# Patient Record
Sex: FEMALE | Race: WHITE | NOT HISPANIC OR LATINO | Employment: OTHER | URBAN - NONMETROPOLITAN AREA
[De-identification: names, ages, dates, MRNs, and addresses within clinical notes are randomized per-mention and may not be internally consistent; named-entity substitution may affect disease eponyms.]

---

## 2017-01-13 ENCOUNTER — HISTORY (OUTPATIENT)
Dept: FAMILY MEDICINE | Facility: OTHER | Age: 62
End: 2017-01-13

## 2017-01-13 ENCOUNTER — OFFICE VISIT - GICH (OUTPATIENT)
Dept: FAMILY MEDICINE | Facility: OTHER | Age: 62
End: 2017-01-13

## 2017-01-13 DIAGNOSIS — E03.9 HYPOTHYROIDISM: ICD-10-CM

## 2017-01-13 DIAGNOSIS — L30.9 DERMATITIS: ICD-10-CM

## 2017-01-13 DIAGNOSIS — B36.9 SUPERFICIAL MYCOSIS: ICD-10-CM

## 2017-01-13 DIAGNOSIS — E55.9 VITAMIN D DEFICIENCY: ICD-10-CM

## 2017-01-13 DIAGNOSIS — E11.9 TYPE 2 DIABETES MELLITUS WITHOUT COMPLICATIONS (H): ICD-10-CM

## 2017-01-13 DIAGNOSIS — E66.09 OTHER OBESITY DUE TO EXCESS CALORIES: ICD-10-CM

## 2017-01-13 DIAGNOSIS — E78.5 HYPERLIPIDEMIA: ICD-10-CM

## 2017-01-13 DIAGNOSIS — G47.30 SLEEP APNEA: ICD-10-CM

## 2017-01-13 DIAGNOSIS — R80.9 PROTEINURIA: ICD-10-CM

## 2017-01-13 LAB
A/G RATIO - HISTORICAL: 1.1 (ref 1–2)
ALB RAND URINE - HISTORICAL: 7 MG/L
ALBUMIN SERPL-MCNC: 4.2 G/DL (ref 3.5–5.7)
ALP SERPL-CCNC: 53 IU/L (ref 34–104)
ALT (SGPT) - HISTORICAL: 31 IU/L (ref 7–52)
ANION GAP - HISTORICAL: 6 (ref 5–18)
AST SERPL-CCNC: 27 IU/L (ref 13–39)
BILIRUB SERPL-MCNC: 0.5 MG/DL (ref 0.3–1)
BILIRUB UR QL: NEGATIVE
BUN SERPL-MCNC: 15 MG/DL (ref 7–25)
BUN/CREAT RATIO - HISTORICAL: 17
CALCIUM SERPL-MCNC: 9.5 MG/DL (ref 8.6–10.3)
CHLORIDE SERPLBLD-SCNC: 104 MMOL/L (ref 98–107)
CHOL/HDL RATIO - HISTORICAL: 5
CHOLESTEROL TOTAL: 150 MG/DL
CLARITY, URINE: CLEAR CLARITY
CO2 SERPL-SCNC: 27 MMOL/L (ref 21–31)
COLOR UR: YELLOW COLOR
CREAT SERPL-MCNC: 0.87 MG/DL (ref 0.7–1.3)
CREATININE, URINE - HISTORICAL: 1.4 G/L
ESTIMATED AVERAGE GLUCOSE: 131 MG/DL
GFR IF NOT AFRICAN AMERICAN - HISTORICAL: >60 ML/MIN/1.73M2
GLOBULIN - HISTORICAL: 3.8 G/DL (ref 2–3.7)
GLUCOSE SERPL-MCNC: 120 MG/DL (ref 70–105)
GLUCOSE URINE: NEGATIVE MG/DL
HDLC SERPL-MCNC: 30 MG/DL (ref 23–92)
HEMOGLOBIN A1C MONITORING (POCT) - HISTORICAL: 6.2 % (ref 4–6.2)
KETONES UR QL: NEGATIVE MG/DL
LDLC SERPL CALC-MCNC: 87 MG/DL
LEUKOCYTE ESTERASE URINE: NEGATIVE
MICROALBUMIN, RAND UR - HISTORICAL: 5 MG/G CREAT
NITRITE UR QL STRIP: NEGATIVE
NON-HDL CHOLESTEROL - HISTORICAL: 120 MG/DL
OCCULT BLOOD,URINE - HISTORICAL: NEGATIVE
PATIENT STATUS - HISTORICAL: ABNORMAL
PH UR: 8.5 [PH]
POTASSIUM SERPL-SCNC: 3.9 MMOL/L (ref 3.5–5.1)
PROT SERPL-MCNC: 8 G/DL (ref 6.4–8.9)
PROTEIN QUALITATIVE,URINE - HISTORICAL: NEGATIVE MG/DL
SODIUM SERPL-SCNC: 137 MMOL/L (ref 133–143)
SP GR UR STRIP: 1.01
TRIGL SERPL-MCNC: 163 MG/DL
TSH - HISTORICAL: 2.84 UIU/ML (ref 0.34–5.6)
UROBILINOGEN,QUALITATIVE - HISTORICAL: NORMAL EU/DL
VITAMIN D TOTAL - HISTORICAL: 61.4 NG/ML

## 2017-02-06 ENCOUNTER — COMMUNICATION - GICH (OUTPATIENT)
Dept: FAMILY MEDICINE | Facility: OTHER | Age: 62
End: 2017-02-06

## 2017-02-06 DIAGNOSIS — E78.5 HYPERLIPIDEMIA: ICD-10-CM

## 2017-03-13 ENCOUNTER — COMMUNICATION - GICH (OUTPATIENT)
Dept: FAMILY MEDICINE | Facility: OTHER | Age: 62
End: 2017-03-13

## 2017-03-13 DIAGNOSIS — E03.9 HYPOTHYROIDISM: ICD-10-CM

## 2017-04-05 ENCOUNTER — COMMUNICATION - GICH (OUTPATIENT)
Dept: FAMILY MEDICINE | Facility: OTHER | Age: 62
End: 2017-04-05

## 2017-04-05 DIAGNOSIS — B37.2 CANDIDIASIS OF SKIN AND NAIL: ICD-10-CM

## 2017-05-24 ENCOUNTER — HISTORY (OUTPATIENT)
Dept: RADIOLOGY | Facility: OTHER | Age: 62
End: 2017-05-24

## 2017-05-24 ENCOUNTER — HOSPITAL ENCOUNTER (OUTPATIENT)
Dept: RADIOLOGY | Facility: OTHER | Age: 62
End: 2017-05-24
Attending: FAMILY MEDICINE

## 2017-05-24 DIAGNOSIS — Z12.31 ENCOUNTER FOR SCREENING MAMMOGRAM FOR MALIGNANT NEOPLASM OF BREAST: ICD-10-CM

## 2017-06-23 ENCOUNTER — AMBULATORY - GICH (OUTPATIENT)
Dept: FAMILY MEDICINE | Facility: OTHER | Age: 62
End: 2017-06-23

## 2017-06-30 ENCOUNTER — OFFICE VISIT - GICH (OUTPATIENT)
Dept: FAMILY MEDICINE | Facility: OTHER | Age: 62
End: 2017-06-30

## 2017-06-30 ENCOUNTER — HISTORY (OUTPATIENT)
Dept: FAMILY MEDICINE | Facility: OTHER | Age: 62
End: 2017-06-30

## 2017-06-30 DIAGNOSIS — M25.611 STIFFNESS OF RIGHT SHOULDER, NOT ELSEWHERE CLASSIFIED: ICD-10-CM

## 2017-07-07 ENCOUNTER — HOSPITAL ENCOUNTER (OUTPATIENT)
Dept: RADIOLOGY | Facility: OTHER | Age: 62
End: 2017-07-07
Attending: FAMILY MEDICINE

## 2017-07-07 DIAGNOSIS — M25.611 STIFFNESS OF RIGHT SHOULDER, NOT ELSEWHERE CLASSIFIED: ICD-10-CM

## 2017-07-17 ENCOUNTER — AMBULATORY - GICH (OUTPATIENT)
Dept: FAMILY MEDICINE | Facility: OTHER | Age: 62
End: 2017-07-17

## 2017-07-17 DIAGNOSIS — S43.431S SUPERIOR GLENOID LABRUM LESION OF RIGHT SHOULDER, SEQUELA: ICD-10-CM

## 2017-07-27 ENCOUNTER — AMBULATORY - GICH (OUTPATIENT)
Dept: FAMILY MEDICINE | Facility: OTHER | Age: 62
End: 2017-07-27

## 2017-07-27 ENCOUNTER — COMMUNICATION - GICH (OUTPATIENT)
Dept: FAMILY MEDICINE | Facility: OTHER | Age: 62
End: 2017-07-27

## 2017-07-27 DIAGNOSIS — S43.431S SUPERIOR GLENOID LABRUM LESION OF RIGHT SHOULDER, SEQUELA: ICD-10-CM

## 2017-07-27 DIAGNOSIS — B37.2 CANDIDIASIS OF SKIN AND NAIL: ICD-10-CM

## 2017-08-18 ENCOUNTER — AMBULATORY - GICH (OUTPATIENT)
Dept: SCHEDULING | Facility: OTHER | Age: 62
End: 2017-08-18

## 2017-09-05 ENCOUNTER — COMMUNICATION - GICH (OUTPATIENT)
Dept: FAMILY MEDICINE | Facility: OTHER | Age: 62
End: 2017-09-05

## 2017-09-05 DIAGNOSIS — E03.9 HYPOTHYROIDISM: ICD-10-CM

## 2017-12-02 ENCOUNTER — COMMUNICATION - GICH (OUTPATIENT)
Dept: FAMILY MEDICINE | Facility: OTHER | Age: 62
End: 2017-12-02

## 2017-12-02 DIAGNOSIS — B37.2 CANDIDIASIS OF SKIN AND NAIL: ICD-10-CM

## 2017-12-16 ENCOUNTER — HEALTH MAINTENANCE LETTER (OUTPATIENT)
Age: 62
End: 2017-12-16

## 2017-12-27 NOTE — PROGRESS NOTES
Patient Information     Patient Name MRN Sex     Neelam Miller [id number] Female 1955      Progress Notes by Tianna Bean R.T. ([facility]) at 2017 11:25 AM     Author:  [facility] Service:  (none) Author Type:  [employer] - Registered Radiologic Technologist     Filed:  2017 11:25 AM Date of Service:  2017 11:25 AM Status:  Signed     :  Tianna Bean R.T. ([facility]) ([facility] - Registered Radiologic Technologist)            Falls Risk Criteria:    Age 65 and older or under age 4        Sensory deficits    Poor vision    Use of ambulatory aides    Impaired judgment    Unable to walk independently    Meets High Risk criteria for falls:  no

## 2017-12-27 NOTE — PROGRESS NOTES
Patient Information     Patient Name MRN Sex     Neelam Miller [id number] Female 1955      Progress Notes by Tianna Bean R.T. ([facility]) at 2017 11:25 AM     Author[facility]  [facility] Service:  (none) Author Type:  [employer] - Registered Radiologic Technologist     Filed:  2017 11:25 AM Date of Service:  2017 11:25 AM Status:  Signed     :  [facility] ([facility] - Registered Radiologic Technologist)            [place] Protocol    A. Pre-procedure verification complete yes  1-relevant information / documentation available, reviewed and properly matched to the patient; 2-consent accurate and complete, 3-equipment and supplies available    B. Site marking complete Yes  Site marked if not in continuous attendance with patient    C. TIME OUT completed yes  Time Out was conducted just prior to starting procedure to verify the eight required elements: 1-patient identity, 2-consent accurate and complete, 3-position, 4-correct side/site marked (if applicable), 5-procedure, 6-relevant images / results properly labeled and displayed (if applicable), 7-antibiotics / irrigation fluids (if applicable), 8-safety precautions.

## 2017-12-28 NOTE — TELEPHONE ENCOUNTER
Patient Information     Patient Name MRN Sex Neelam Crenshaw [id number] Female 1955      Telephone Encounter by Clementina Damon RN at 2017  9:59 AM     Author:  Clementina Damon RN Service:  (none) Author Type:  NURS- Registered Nurse     Filed:  2017 10:02 AM Encounter Date:  2017 Status:  Signed     :  Clementina Damon RN (NURS- Registered Nurse)            This is [facility]  Name of Medication: Fluconazole (DIFLUCAN) 100 mg tablet  Quantity requested: 15 x 1   Last fill date: 17  Last visit with KANDI CONN was on: 2017   Diagnosis r/t this medication request: Yeast Dermatitis      Unable to complete prescription refill per RN Medication Refill Policy.................... Clementina Damon RN ....................  2017   10:00 AM

## 2017-12-28 NOTE — TELEPHONE ENCOUNTER
Patient Information     Patient Name MRN Sex Neelam Crenshaw [id number] Female 1955      Telephone Encounter by Clementina Damon RN at 2017  1:07 PM     Author:  Clementina Damon RN Service:  (none) Author Type:  NURS- Registered Nurse     Filed:  2017  1:12 PM Encounter Date:  2017 Status:  Signed     :  Clementina Damon RN (NURS- Registered Nurse)            This is a [facility]  Name of Medication: Thyroid, Pork, (NATURE-THROID) 130 mg tab  Quantity requested: 90 x 1   Last fill date: 3/13/17  Due for refill: yes  Last visit with KANDI CONN was on: 2017    Diagnosis r/t this medication request: Hypothroidism     Unable to complete prescription refill per RN Medication Refill Policy.................... Clementina Damon RN ....................  2017   1:08 PM      TSH (uIU/mL)    Date Value   2017 2.84

## 2017-12-28 NOTE — PROGRESS NOTES
"Patient Information     Patient Name MRN Sex Neelam Crenshaw [id number] Female 1955      Progress Notes by Flori Perry MD at 2017  1:15 PM     Author:  Flori Perry MD Service:  (none) Author Type:  Physician     Filed:  2017  2:06 PM Encounter Date:  2017 Status:  Signed     :  Flori Perry MD (Physician)            SUBJECTIVE:    Neelam Miller is a 62 y.o. female who presents for evaluation of shoulder pain.  Nursing Notes:   Gosselin, Norma J  2017  1:19 PM  Signed  Patient presents to clinic for right shoulder pain. Requesting MRI and PT referral to see Ludivina Langford.  Norma J Gosselin LPN....................  2017   1:15 PM           HPI  Neelam Miller is a 62 y.o. female had called to request an MRI scan of her right shoulder. The following is the message that she had sent in:  \"I need to get a referral to get an MRI on my right shoulder.  I have been working with my chiropractor (Dr. Lisa Simmons) at [facility]. I have been complaining of a limitation in my right shoulder--namely being able to lift my arm upwards. 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   Dr. Simmons' note states:   \"Patient has limited abduction into right shoulder; suspect a supraspinatus tendon inflammatory problem and lessor-bicep tendon.\" \"  Onset of symptoms since April.  She was helping her 3-pod dog getting up and and down the stairs.  She was noticing anterior shoulder pain and tender and posterior upper arm tenderness too.  She was practicing sterngth movements, noticed that some of her range of motion was unequal. Pain is mild to moderate, relieved with over-the-counter analgesics and rest      Allergies      Allergen   Reactions     Antihistamines - Alkylamine  Insomnia     Corn  Other - Describe In Comment Field     " Allergy testing      Gluten  Other - Describe In Comment Field     Allergy testing      Milk  Nausea And Vomiting     Mold Extracts  Other - Describe In Comment Field     Allergy testing      Wheat Bran  Other - Describe In Comment Field     Allergy testing    ,   Current Outpatient Prescriptions     Medication  Sig     ascorbic acid (VITAMIN C) 500 mg tablet Take 500 mg by mouth once daily.     aspirin 81 mg tablet Take 81 mg by mouth once daily with a meal.     B.ani-L.aci-L.sal-L.plan-L.Ernie (PROBIOTIC FORMULA) 10 billion cell (2 billion ea) ElPL Take  by mouth once daily.     calcium-vitamin D3-vitamin K, 500 mg-100 units-40 mcg, (VIACTIV) 500-100-40 mg-unit-mcg chewable tablet Take 2 tablets by mouth once daily.     clotrimazole-betamethasone cream (LOTRISONE) 1-0.05 % cream Apply to nose daily as needed     econazole nitrate cream (SPECTAZOLE 1%) 1 % cream Apply  topically to affected area(s) once daily.     ERGOCALCIFEROL (VITAMIN D ORAL) Take  by mouth.     fluconazole (DIFLUCAN) 100 mg tablet Take 1 tablet by mouth once daily.     hydroCHLOROthiazide (HCTZ) 25 mg tablet Take 1 tablet by mouth once daily.     lisinopril (PRINIVIL; ZESTRIL) 20 mg tablet Take 1 tablet by mouth once daily.     MAGNESIUM ORAL Take  by mouth once daily.     multivitamin (MVI) tablet Take 1 tablet by mouth once daily.     omega-3s-dha-epa-fish oil (OMEGA 3) 350-400 mg cap daily     simvastatin (ZOCOR) 20 mg tablet Take 1 tablet by mouth at bedtime.     Thyroid, Pork, (NATURE-THROID) 130 mg tab Take 130 mg by mouth once daily.     vitamin B complex (B COMPLEX 1) tablet Take 1 tablet by mouth once daily.     Zinc Gluconate 30 mg tablet Take 1 capsule by mouth once daily.     No current facility-administered medications for this visit.      Medications have been reviewed by me and are current to the best of my knowledge and ability.  and   Past Medical History:     Diagnosis  Date     Allergic rhinitis      Depression 03/2012      "Diabetes     Type 2      Hormone replacement therapy (postmenopausal) 5/04     discontinued      Hyperlipidemia      Hypertension      Hypothyroid      Lupus (HC)     pt denies-tests were neg      Obesity      Sleep apnea     wears cpap prn        REVIEW OF SYSTEMS:  ROS    OBJECTIVE:  /72  Pulse 76  Temp 98.7  F (37.1  C) (Tympanic)  Ht 1.727 m (5' 8\")  Wt 111.4 kg (245 lb 8 oz)  BMI 37.33 kg/m2    EXAM:   Physical Exam   Constitutional: She is well-developed, well-nourished, and in no distress.   Musculoskeletal:   AC joint tenderness; tenderness ant to elbow/posteriorly; she has decreased range of motion in abduction and external rotation. Internal rotation is fairly normal. She has a negative empty can sign. Strength is intact. Sensation is intact.   Nursing note and vitals reviewed.      ASSESSMENT/PLAN:    ICD-10-CM    1. Decreased right shoulder range of motion M25.611 MR ARTHROGRAM SHOULDER RIGHT      XR INJ CT/MR ARTHROGRAM SHOULDER RIGHT      AMB CONSULT TO PHYSICAL THERAPY        Plan:  1. Concerns with her shoulder symptoms include increased range of motion, pain for nearly 2 months. She's been going to chiropractor, receiving conservative care. At this point, she'll be scheduled for an MR arthrogram of the right shoulder and referred to physical therapy. Additional follow-up will be pending results of this.  Flori Perry MD           "

## 2017-12-30 NOTE — NURSING NOTE
Patient Information     Patient Name MRN Sex Neelam Crenshaw [id number] Female 1955      Nursing Note by Gosselin, Norma J at 2017  1:15 PM     Author:  Gosselin, Norma J Service:  (none) Author Type:  (none)     Filed:  2017  1:19 PM Encounter Date:  2017 Status:  Signed     :  Gosselin, Norma J            Patient presents to clinic for right shoulder pain. Requesting MRI and PT referral to see Ludivina Langford.  Norma J Gosselin LPN....................  2017   1:15 PM

## 2018-01-02 NOTE — NURSING NOTE
Patient Information     Patient Name MRN Neleam Gray [id number] Female 1955      Nursing Note by Tia Jackman at 2017  9:00 AM     Author:  Tia Jackman Service:  (none) Author Type:  (none)     Filed:  2017  9:31 AM Encounter Date:  2017 Status:  Signed     :  Tia Jackman            Patient here today for medication management  Tia Jackman LPN..............................2017  9:10 AM

## 2018-01-03 NOTE — TELEPHONE ENCOUNTER
Patient Information     Patient Name MRN Neelam Gray [id number] Female 1955      Telephone Encounter by Clementina Damon RN at 2017  8:42 AM     Author:  Clementina Damon RN Service:  (none) Author Type:  NURS- Registered Nurse     Filed:  2017  8:44 AM Encounter Date:  2017 Status:  Signed     :  Clementina Damon RN (NURS- Registered Nurse)            Refill request inappropriate. Filled 17-90 x 3. Pharmacy notified. Unable to complete prescription refill per RN Medication Refill Policy.................... Clementina Damon RN ....................  2017   8:44 AM    2017  Ordered by: KANDI CONN  Medication:simvastatin (ZOCOR) 20 mg tablet    Qty:90 tablet   Ref:3  Start:2017   End:              Route:Oral                  DALTON:No   Class:eRx    Sig:Take 1 tablet by mouth at bedtime.    Pharmacy:[facility] [id number] [place] [facility], [place] [address]

## 2018-01-03 NOTE — PROGRESS NOTES
Patient Information     Patient Name MRN Sex Neelam Crenshaw [id number] Female 1955      Progress Notes by Flori Perry MD at 2017  9:00 AM     Author:  Flori Perry MD Service:  (none) Author Type:  Physician     Filed:  1/15/2017 10:29 AM Encounter Date:  2017 Status:  Signed     :  Flori Perry MD (Physician)            SUBJECTIVE:    Neelam Miller is a 61 y.o. female who presents for medication follow up   Nursing Notes:   Tia Jackman  2017  9:31 AM  Signed  Patient here today for medication management  Tia Jackman LPN..............................2017  9:10 AM      HPI  Neelam Miller is a 61 y.o. female presents for medication follow up.  Her medical history is significant for type 2 diabetes, Vitamin D deficiency, hypothyroidism, hyperlipidemia, obesity, sleep apnea and recurrent fungal dermatitis.      She is currently on simvastatin for hyperlipidemia and type 2 diabetes. Wonders about taking CoQ10 along with this.  Has episodes of myalgias.  Lately her stress level has been lower as school requirements are coming to an end.  Just returned from [place], felt a cold coming on before she left.  Took mucinex, also did nasal irrigation  She hasn't been checking BS at home.  States she is not compliant with her diet or exercise routine.  Denies problems with her feet.  Does get recurrent fungal infections which she attributes more to work place exposure than her blood sugars.  Flu vaccine is up to date.  Just returned from [place] - anticipates that Vitamin D level should be good.  HEMOGLOBIN A1C MONITORING (POCT)    Date Value   2015 6.2 %   2012 5.8 % NGSP     HEMOGLOBIN A1C GIH (%)    Date Value   2012 5.5      Allergies      Allergen   Reactions     Antihistamines - Alkylamine  Insomnia     Corn  Other - Describe In Comment Field     Allergy testing      Gluten  Other - Describe In Comment Field     Allergy testing       Milk  Nausea And Vomiting     Mold Extracts  Other - Describe In Comment Field     Allergy testing      Wheat Bran  Other - Describe In Comment Field     Allergy testing    ,   Current Outpatient Prescriptions     Medication  Sig     ascorbic acid (VITAMIN C) 500 mg tablet Take 500 mg by mouth once daily.     aspirin 81 mg tablet Take 81 mg by mouth once daily with a meal.     B.ani-L.aci-L.sal-L.plan-L.Ernie (PROBIOTIC FORMULA) 10 billion cell (2 billion ea) ElPL Take  by mouth once daily.     calcium-vitamin D3-vitamin K, 500 mg-100 units-40 mcg, (VIACTIV) 500-100-40 mg-unit-mcg chewable tablet Take 2 tablets by mouth once daily.     clotrimazole-betamethasone cream (LOTRISONE) 1-0.05 % cream Apply to nose daily as needed     econazole nitrate cream (SPECTAZOLE 1%) 1 % cream Apply  topically to affected area(s) once daily.     ERGOCALCIFEROL (VITAMIN D ORAL) Take  by mouth.     fluconazole (DIFLUCAN) 100 mg tablet TAKE ONE TABLET BY MOUTH ONE TIME DAILY     hydroCHLOROthiazide (HCTZ) 25 mg tablet Take 1 tablet by mouth once daily.     lisinopril (PRINIVIL; ZESTRIL) 20 mg tablet TAKE ONE TABLET BY MOUTH ONE TIME DAILY      MAGNESIUM ORAL Take  by mouth once daily.     multivitamin (MVI) tablet Take 1 tablet by mouth once daily.     omega-3s-dha-epa-fish oil (OMEGA 3) 350-400 mg cap daily     simvastatin (ZOCOR) 20 mg tablet TAKE ONE TABLET BY MOUTH NIGHTLY AT BEDTIME     Thyroid, Pork, (NATURE-THROID) 130 mg tab Take 130 mg by mouth once daily.     vitamin B complex (B COMPLEX 1) tablet Take 1 tablet by mouth once daily.     Zinc Gluconate 30 mg tablet Take 1 capsule by mouth once daily.     No current facility-administered medications for this visit.      Medications have been reviewed by me and are current to the best of my knowledge and ability.  and   Past Medical History      Diagnosis   Date     Allergic rhinitis       Depression  03/2012     Diabetes       Type 2      Hormone replacement therapy  (postmenopausal)  5/04      discontinued      Hyperlipidemia       Hypertension       Hypothyroid       Lupus (HC)       pt denies-tests were neg      Obesity       Sleep apnea       wears cpap prn        REVIEW OF SYSTEMS:  Review of Systems   Constitutional: Negative for weight loss.   HENT:        Last dentist visit was in august   Eyes: Negative for blurred vision.        Last eye exam October 2016   Genitourinary: Positive for urgency.   Skin:        Skin on nose, flaking and peeling   Endo/Heme/Allergies: Positive for environmental allergies. Does not bruise/bleed easily.   Psychiatric/Behavioral: Negative for depression.   Mammogram due next month.    OBJECTIVE:  /70  Pulse 66  Wt 110.8 kg (244 lb 3.2 oz)  BMI 37.14 kg/m2    EXAM:   Physical Exam   Constitutional: She is well-developed, well-nourished, and in no distress.   Cardiovascular: Normal rate and regular rhythm.    Pulmonary/Chest: Effort normal and breath sounds normal.   Musculoskeletal:   Normal foot exam.   Skin:   Erythema and peeling of her nose, some telangectasia of her cheeks.   Psychiatric: Mood and affect normal.   Vitals reviewed.    Results for orders placed or performed in visit on 01/13/17      HEMOGLOBIN A1C MONITORING (POCT)      Result  Value Ref Range    HEMOGLOBIN A1C MONITORING (POCT) 6.2 4.0 - 6.2 %    ESTIMATED AVERAGE GLUCOSE  131 mg/dL   COMP METABOLIC PANEL      Result  Value Ref Range    SODIUM 137 133 - 143 mmol/L    POTASSIUM 3.9 3.5 - 5.1 mmol/L    CHLORIDE 104 98 - 107 mmol/L    CO2,TOTAL 27 21 - 31 mmol/L    ANION GAP 6 5 - 18                    GLUCOSE 120 (H) 70 - 105 mg/dL    CALCIUM 9.5 8.6 - 10.3 mg/dL    BUN 15 7 - 25 mg/dL    CREATININE 0.87 0.70 - 1.30 mg/dL    BUN/CREAT RATIO           17                    GFR if African American >60 >60 ml/min/1.73m2    GFR if not African American >60 >60 ml/min/1.73m2    ALBUMIN 4.2 3.5 - 5.7 g/dL    PROTEIN,TOTAL 8.0 6.4 - 8.9 g/dL    GLOBULIN                  3.8  (H) 2.0 - 3.7 g/dL    A/G RATIO 1.1 1.0 - 2.0                    BILIRUBIN,TOTAL 0.5 0.3 - 1.0 mg/dL    ALK PHOSPHATASE 53 34 - 104 IU/L    ALT (SGPT) 31 7 - 52 IU/L    AST (SGOT) 27 13 - 39 IU/L   LIPID PANEL      Result  Value Ref Range    CHOLESTEROL,TOTAL 150 <200 mg/dL    TRIGLYCERIDES 163 (H) <150 mg/dL    HDL CHOLESTEROL 30 23 - 92 mg/dL    NON-HDL CHOLESTEROL 120 <145 mg/dl    CHOL/HDL RATIO            5.00 (H) <4.50                    LDL CHOLESTEROL 87 <100 mg/dL    PATIENT STATUS            NOT GIVEN                   TSH      Result  Value Ref Range    TSH 2.84 0.34 - 5.60 uIU/mL   VITAMIN D 25 (DEFICIENCY)      Result  Value Ref Range    VITAMIN D TOTAL AFL 61.4   ng/mL   MICROALBUMIN RANDOM URINE      Result  Value Ref Range    ALB RAND URINE            7.0 mg/L    CREATININE,URINE          1.40 g/L    MICROALBUMIN,RAND UR      5.0 <30.0 mg/g creat   URINALYSIS W REFLEX MICROSCOPIC IF POSITIVE      Result  Value Ref Range    COLOR                     Yellow Yellow Color    CLARITY                   Clear Clear Clarity    SPECIFIC GRAVITY,URINE    1.015 1.010, 1.015, 1.020, 1.025                    PH,URINE                  8.5 6.0, 7.0, 8.0, 5.5, 6.5, 7.5, 8.5                    UROBILINOGEN,QUALITATIVE  Normal Normal EU/dl    PROTEIN, URINE Negative Negative mg/dL    GLUCOSE, URINE Negative Negative mg/dL    KETONES,URINE             Negative Negative mg/dL    BILIRUBIN,URINE           Negative Negative                    OCCULT BLOOD,URINE        Negative Negative                    NITRITE                   Negative Negative                    LEUKOCYTE ESTERASE        Negative Negative                     PHQ Depression Screening 7/15/2015 11/20/2015   Date of PHQ exam (doc flow) 7/15/2015 11/20/2015   1. Lack of interest/pleasure 0 - Not at all 0 - Not at all   2. Feeling down/depressed 1 - Several days 0 - Not at all   PHQ-2 TOTAL SCORE 1 0   3. Trouble sleeping 0 - Not at all 0 - Not at all   4.  Decreased energy 1 - Several days 1 - Several days   5. Appetite change 1 - Several days 1 - Several days   6. Feelings of failure 1 - Several days 0 - Not at all   7. Trouble concentrating 0 - Not at all 0 - Not at all   8. Activity level 0 - Not at all 0 - Not at all   9. Hurting yourself 0 - Not at all 0 - Not at all   PHQ-9 TOTAL SCORE 4 2   PHQ-9 Severity Level none none   Functional Impairment not difficult at all not difficult at all       ASSESSMENT/PLAN:    ICD-10-CM    1. DIABETES MELLITUS, TYPE II E11.9 HEMOGLOBIN A1C MONITORING (POCT)      COMP METABOLIC PANEL      LIPID PANEL      MICROALBUMIN RANDOM URINE      URINALYSIS W REFLEX MICROSCOPIC IF POSITIVE      hydroCHLOROthiazide (HCTZ) 25 mg tablet   2. Acquired hypothyroidism E03.9 TSH   3. Non morbid obesity due to excess calories E66.09    4. Hyperlipidemia, unspecified hyperlipidemia type E78.5 simvastatin (ZOCOR) 20 mg tablet   5. MICROALBUMINURIA R80.9 lisinopril (PRINIVIL; ZESTRIL) 20 mg tablet   6. Sleep apnea, unspecified type G47.30    7. Vitamin D deficiency E55.9 VITAMIN D 25 (DEFICIENCY)   8. FUNGAL DERMATITIS B36.9         Plan:  1.  I have personally reviewed the labs listed above.  2.  Adequate diabetes control.  Encouraged exercise, wt loss measures for long term treatment.  3.  Vitamin D level normal - no changes needed.  4.  Continue same dose of synthroid.  5.  Microalbuminuria improved.  6.  Continue sleep apnea treatment.  7.  BP with adequate control.  8.  Immunizations are up to date.  9.  Ok to add over the counter CoQ10 for statin related myalgias..    10 follow up in 6 months.    Flori Perry MD

## 2018-01-03 NOTE — TELEPHONE ENCOUNTER
Patient Information     Patient Name MRN Sex Neelam Crenshaw [id number] Female 1955      Telephone Encounter by Clementina Damon RN at 3/13/2017  8:49 AM     Author:  Clementina Damon RN Service:  (none) Author Type:  NURS- Registered Nurse     Filed:  3/13/2017  9:20 AM Encounter Date:  3/13/2017 Status:  Signed     :  Clementina Damon RN (NURS- Registered Nurse)            This is a Refill request from: [facility]  Name of Medication: Nature-Throid 130 mg  Quantity requested: 90 x 3   Last fill date: 16  Due for refill: yes  Last visit with FLORI CONN was on: 2017 in [facility]  PCP:  Flori Conn MD  Controlled Substance Agreement:  n/a   Diagnosis r/t this medication request: Acquired Hypothyroidism      Unable to complete prescription refill per RN Medication Refill Policy.................... Clementina Damon RN ....................  3/13/2017   8:49 AM  Lab testing requirements:  TSH annually  TSH (uIU/mL)    Date Value   2017 2.84

## 2018-01-03 NOTE — PATIENT INSTRUCTIONS
Patient Information     Patient Name MRN Sex Neelam Crenshaw [id number] Female 1955      Patient Instructions by Flori Perry MD at 2017  9:00 AM     Author:  Flori Perry MD Service:  (none) Author Type:  Physician     Filed:  2017  9:33 AM Encounter Date:  2017 Status:  Signed     :  Flori Perry MD (Physician)            COQ10 dose, 10mg along with simvastatin, over the counter

## 2018-01-04 NOTE — TELEPHONE ENCOUNTER
Patient Information     Patient Name MRN Sex Neelam Crenshaw [id number] Female 1955      Telephone Encounter by Clementina Damon RN at 2017 11:01 AM     Author:  Clementina Damon RN Service:  (none) Author Type:  NURS- Registered Nurse     Filed:  2017 11:06 AM Encounter Date:  2017 Status:  Signed     :  Clementina Damon RN (NURS- Registered Nurse)            [facility]  Name of Medication: Fluconazole (DIFLUCAN) 100 mg tablet  Quantity requested: 15 x 1   Last fill date: 17  Due for refill:   Last visit with FLORI CONN was on: 2017 in [facility]-due for a follow up in 2017  PCP:  Flori Conn MD  Controlled Substance Agreement: na  Diagnosis r/t this medication request: Yeast Dermatitis     Unable to complete prescription refill per RN Medication Refill Policy.................... Clementina Damon RN ....................  2017   11:02 AM

## 2018-01-05 NOTE — PROGRESS NOTES
Patient Information     Patient Name MRN Sex Neelam Crenshaw [id number] Female 1955      Progress Notes by Salima Jurado at 2017 12:38 PM     Author:  Salima Jurado Service:  (none) Author Type:  (none)     Filed:  2017 12:38 PM Date of Service:  2017 12:38 PM Status:  Signed     :  Salima Jurado            Falls Risk Criteria:    Age 65 and older or under age 4        Sensory deficits    Poor vision    Use of ambulatory aides    Impaired judgment    Unable to walk independently    Meets High Risk criteria for falls:  no

## 2018-01-08 ENCOUNTER — COMMUNICATION - GICH (OUTPATIENT)
Dept: FAMILY MEDICINE | Facility: OTHER | Age: 63
End: 2018-01-08

## 2018-01-08 DIAGNOSIS — E03.9 HYPOTHYROIDISM: ICD-10-CM

## 2018-01-19 PROBLEM — G47.30 SLEEP APNEA: Status: ACTIVE | Noted: 2018-01-19

## 2018-01-19 PROBLEM — S43.431A TEAR OF RIGHT GLENOID LABRUM: Status: ACTIVE | Noted: 2017-07-17

## 2018-01-19 PROBLEM — R80.9 MICROALBUMINURIA: Status: ACTIVE | Noted: 2018-01-19

## 2018-01-19 PROBLEM — E66.9 OBESITY: Status: ACTIVE | Noted: 2018-01-19

## 2018-01-19 PROBLEM — E03.9 HYPOTHYROIDISM: Status: ACTIVE | Noted: 2018-01-19

## 2018-01-19 PROBLEM — E11.9 DIABETES MELLITUS, TYPE II (H): Status: ACTIVE | Noted: 2018-01-19

## 2018-01-19 PROBLEM — E78.5 HYPERLIPIDEMIA: Status: ACTIVE | Noted: 2018-01-19

## 2018-01-19 RX ORDER — ECONAZOLE NITRATE 10 MG/G
CREAM TOPICAL
COMMUNITY
Start: 2014-04-01 | End: 2018-03-05

## 2018-01-19 RX ORDER — ASCORBIC ACID 500 MG
500 TABLET ORAL DAILY
COMMUNITY
End: 2018-03-05

## 2018-01-19 RX ORDER — CLOTRIMAZOLE AND BETAMETHASONE DIPROPIONATE 10; .64 MG/G; MG/G
CREAM TOPICAL
COMMUNITY
Start: 2017-01-13 | End: 2021-11-03

## 2018-01-19 RX ORDER — MULTIVITAMIN WITH IRON
TABLET ORAL
COMMUNITY
End: 2018-03-05

## 2018-01-19 RX ORDER — SIMVASTATIN 20 MG
20 TABLET ORAL AT BEDTIME
COMMUNITY
Start: 2017-01-13 | End: 2018-03-05

## 2018-01-19 RX ORDER — FLUCONAZOLE 100 MG/1
100 TABLET ORAL DAILY
COMMUNITY
Start: 2017-12-04 | End: 2018-03-05

## 2018-01-19 RX ORDER — VITAMIN B COMPLEX
1 TABLET ORAL DAILY
COMMUNITY
End: 2023-08-22

## 2018-01-19 RX ORDER — DIPHENOXYLATE HYDROCHLORIDE AND ATROPINE SULFATE 2.5; .025 MG/1; MG/1
1 TABLET ORAL DAILY
COMMUNITY
End: 2021-04-06

## 2018-01-19 RX ORDER — CALCIUM CARBONATE/VITAMIN D3 600 MG-10
1 TABLET ORAL DAILY
COMMUNITY
End: 2024-06-24

## 2018-01-19 RX ORDER — HYDROCHLOROTHIAZIDE 25 MG/1
25 TABLET ORAL DAILY
COMMUNITY
Start: 2017-01-13 | End: 2018-03-05

## 2018-01-19 RX ORDER — LISINOPRIL 20 MG/1
20 TABLET ORAL DAILY
COMMUNITY
Start: 2017-01-13 | End: 2018-03-05

## 2018-01-19 RX ORDER — ERGOCALCIFEROL 1.25 MG/1
CAPSULE ORAL
COMMUNITY
End: 2018-03-05

## 2018-01-25 ENCOUNTER — COMMUNICATION - GICH (OUTPATIENT)
Dept: FAMILY MEDICINE | Facility: OTHER | Age: 63
End: 2018-01-25

## 2018-01-25 VITALS
TEMPERATURE: 98.7 F | SYSTOLIC BLOOD PRESSURE: 124 MMHG | BODY MASS INDEX: 37.21 KG/M2 | DIASTOLIC BLOOD PRESSURE: 72 MMHG | WEIGHT: 245.5 LBS | HEART RATE: 76 BPM | HEIGHT: 68 IN

## 2018-01-25 VITALS
SYSTOLIC BLOOD PRESSURE: 122 MMHG | WEIGHT: 244.2 LBS | BODY MASS INDEX: 37.14 KG/M2 | DIASTOLIC BLOOD PRESSURE: 70 MMHG | HEART RATE: 66 BPM

## 2018-01-25 DIAGNOSIS — E78.5 HYPERLIPIDEMIA: ICD-10-CM

## 2018-02-08 ENCOUNTER — AMBULATORY - GICH (OUTPATIENT)
Dept: FAMILY MEDICINE | Facility: OTHER | Age: 63
End: 2018-02-08

## 2018-02-08 DIAGNOSIS — M25.569 PAIN IN KNEE: ICD-10-CM

## 2018-02-12 NOTE — TELEPHONE ENCOUNTER
Patient Information     Patient Name MRN Sex Neelam Crenshaw [id number] Female 1955      Telephone Encounter by Clementina Damon RN at 2017 10:30 AM     Author:  Clementina Damon RN Service:  (none) Author Type:  NURS- Registered Nurse     Filed:  2017 10:32 AM Encounter Date:  2017 Status:  Signed     :  Clementina Damon RN (NURS- Registered Nurse)            [facility]  Name of Medication: fluconazole (DIFLUCAN) 100 mg tablet  Quantity requested: 15 x 1   Last fill date: 2017  Due for refill: yes pr pharmacy  Last visit with KANDI CONN was on: 2017   Diagnosis r/t this medication request: Yeast dermatitis     Unable to complete prescription refill per RN Medication Refill Policy.................... Clementina Damon RN ....................  2017   10:30 AM

## 2018-02-12 NOTE — TELEPHONE ENCOUNTER
Patient Information     Patient Name MRN Sex Neelam Crenshaw [id number] Female 1955      Telephone Encounter by Clementina Damon RN at 2018 11:26 AM     Author:  Clementina Damon RN Service:  (none) Author Type:  NURS- Registered Nurse     Filed:  2018 11:34 AM Encounter Date:  2018 Status:  Signed     :  Clementina Damon RN (NURS- Registered Nurse)            This is a Refill request from: [facility]  Name of Medication: NATURE-THROID 130 mg tab  Quantity requested: 90  Last fill date: 17  Last visit with KANDI CONN was on: 2017. Last medication management OV was on 17. Reminder letter sent.   Diagnosis r/t this medication request: hypothyroidism      Unable to complete prescription refill per RN Medication Refill Policy.................... Clementina Damon RN ....................  2018   11:26 AM      TSH (uIU/mL)    Date Value   2017 2.84

## 2018-02-13 NOTE — TELEPHONE ENCOUNTER
Patient Information     Patient Name MRN Sex Neelam Crenshaw [id number] Female 1955      Telephone Encounter by Clementina Damon RN at 2018 11:35 AM     Author:  Clementina Damon RN Service:  (none) Author Type:  NURS- Registered Nurse     Filed:  2018 11:44 AM Encounter Date:  2018 Status:  Signed     :  Clementina Damon RN (NURS- Registered Nurse)            Statins  Office visit in the past 12 months.  Last visit with KANDI CONN was on: 2017 in [facility]  Next visit with KANDI CONN is on: No future appointment listed with this provider  Next visit with Family Practice is on: No future appointment listed in this department  Last Lipids:  Chol: 150    2017  T    2017  HDL:   30    2017  LDL:  87    2017  LDL DIRECT:  No results found in past 5 years    .  Concommitant use of fibrates and statins-If it is an addition to the medication list, review note and/or discuss with provider.  If already on medication list, refill.  Max refills 12 months from last office visit.    Due for annual medication management OV.  Limited refill per protocol and letter mailed.  Clementina Damon RN ........   2018    11:42 AM

## 2018-02-22 ENCOUNTER — TRANSFERRED RECORDS (OUTPATIENT)
Dept: HEALTH INFORMATION MANAGEMENT | Facility: OTHER | Age: 63
End: 2018-02-22

## 2018-03-05 ENCOUNTER — OFFICE VISIT (OUTPATIENT)
Dept: FAMILY MEDICINE | Facility: OTHER | Age: 63
End: 2018-03-05
Attending: FAMILY MEDICINE
Payer: COMMERCIAL

## 2018-03-05 VITALS
SYSTOLIC BLOOD PRESSURE: 132 MMHG | WEIGHT: 240.6 LBS | HEIGHT: 68 IN | BODY MASS INDEX: 36.46 KG/M2 | HEART RATE: 68 BPM | DIASTOLIC BLOOD PRESSURE: 88 MMHG

## 2018-03-05 DIAGNOSIS — I10 ESSENTIAL HYPERTENSION: ICD-10-CM

## 2018-03-05 DIAGNOSIS — E78.5 HYPERLIPIDEMIA, UNSPECIFIED HYPERLIPIDEMIA TYPE: ICD-10-CM

## 2018-03-05 DIAGNOSIS — B37.9 YEAST INFECTION: ICD-10-CM

## 2018-03-05 DIAGNOSIS — E11.9 TYPE 2 DIABETES MELLITUS WITHOUT COMPLICATION, WITHOUT LONG-TERM CURRENT USE OF INSULIN (H): ICD-10-CM

## 2018-03-05 DIAGNOSIS — R80.9 MICROALBUMINURIA: ICD-10-CM

## 2018-03-05 DIAGNOSIS — E55.9 VITAMIN D DEFICIENCY: ICD-10-CM

## 2018-03-05 DIAGNOSIS — E03.9 ACQUIRED HYPOTHYROIDISM: ICD-10-CM

## 2018-03-05 DIAGNOSIS — G47.33 OBSTRUCTIVE SLEEP APNEA SYNDROME: ICD-10-CM

## 2018-03-05 DIAGNOSIS — Z00.00 ROUTINE GENERAL MEDICAL EXAMINATION AT A HEALTH CARE FACILITY: Primary | ICD-10-CM

## 2018-03-05 LAB
ALBUMIN SERPL-MCNC: 4.5 G/DL (ref 3.5–5.7)
ALP SERPL-CCNC: 51 U/L (ref 34–104)
ALT SERPL W P-5'-P-CCNC: 40 U/L (ref 7–52)
ANION GAP SERPL CALCULATED.3IONS-SCNC: 8 MMOL/L (ref 3–14)
AST SERPL W P-5'-P-CCNC: 29 U/L (ref 13–39)
BILIRUB SERPL-MCNC: 0.6 MG/DL (ref 0.3–1)
BUN SERPL-MCNC: 13 MG/DL (ref 7–25)
CALCIUM SERPL-MCNC: 9.3 MG/DL (ref 8.6–10.3)
CHLORIDE SERPL-SCNC: 102 MMOL/L (ref 98–107)
CHOLEST SERPL-MCNC: 148 MG/DL
CO2 SERPL-SCNC: 30 MMOL/L (ref 21–31)
CREAT SERPL-MCNC: 0.84 MG/DL (ref 0.6–1.2)
CREAT UR-MCNC: 137 MG/DL
DEPRECATED CALCIDIOL+CALCIFEROL SERPL-MC: 89.3 NG/ML
GFR SERPL CREATININE-BSD FRML MDRD: 68 ML/MIN/1.7M2
GLUCOSE SERPL-MCNC: 119 MG/DL (ref 70–105)
HBA1C MFR BLD: 6.2 % (ref 4–6)
HDLC SERPL-MCNC: 36 MG/DL (ref 23–92)
LDLC SERPL CALC-MCNC: 76 MG/DL
MICROALBUMIN UR-MCNC: 12 MG/L
MICROALBUMIN/CREAT UR: 8.61 MG/G CR (ref 0–25)
NONHDLC SERPL-MCNC: 112 MG/DL
POTASSIUM SERPL-SCNC: 3.8 MMOL/L (ref 3.5–5.1)
PROT SERPL-MCNC: 8.2 G/DL (ref 6.4–8.9)
SODIUM SERPL-SCNC: 140 MMOL/L (ref 134–144)
TRIGL SERPL-MCNC: 182 MG/DL
TSH SERPL DL<=0.05 MIU/L-ACNC: 1.98 IU/ML (ref 0.34–5.6)

## 2018-03-05 PROCEDURE — 84443 ASSAY THYROID STIM HORMONE: CPT | Performed by: FAMILY MEDICINE

## 2018-03-05 PROCEDURE — 80053 COMPREHEN METABOLIC PANEL: CPT | Performed by: FAMILY MEDICINE

## 2018-03-05 PROCEDURE — 82306 VITAMIN D 25 HYDROXY: CPT | Performed by: FAMILY MEDICINE

## 2018-03-05 PROCEDURE — 99396 PREV VISIT EST AGE 40-64: CPT | Performed by: FAMILY MEDICINE

## 2018-03-05 PROCEDURE — 80061 LIPID PANEL: CPT | Performed by: FAMILY MEDICINE

## 2018-03-05 PROCEDURE — 83036 HEMOGLOBIN GLYCOSYLATED A1C: CPT | Performed by: FAMILY MEDICINE

## 2018-03-05 PROCEDURE — 36415 COLL VENOUS BLD VENIPUNCTURE: CPT | Performed by: FAMILY MEDICINE

## 2018-03-05 PROCEDURE — 82043 UR ALBUMIN QUANTITATIVE: CPT | Performed by: FAMILY MEDICINE

## 2018-03-05 RX ORDER — HYDROCHLOROTHIAZIDE 25 MG/1
25 TABLET ORAL DAILY
Qty: 90 TABLET | Refills: 3 | Status: SHIPPED | OUTPATIENT
Start: 2018-03-05 | End: 2019-02-25

## 2018-03-05 RX ORDER — SIMVASTATIN 20 MG
20 TABLET ORAL AT BEDTIME
Qty: 90 TABLET | Refills: 3 | Status: SHIPPED | OUTPATIENT
Start: 2018-03-05 | End: 2019-02-25

## 2018-03-05 RX ORDER — LISINOPRIL 20 MG/1
10 TABLET ORAL DAILY
Qty: 45 TABLET | Refills: 3 | Status: SHIPPED | OUTPATIENT
Start: 2018-03-05 | End: 2018-03-05

## 2018-03-05 RX ORDER — FLUCONAZOLE 100 MG/1
100 TABLET ORAL PRN
Qty: 60 TABLET | Refills: 5 | Status: SHIPPED | OUTPATIENT
Start: 2018-03-05 | End: 2019-07-01

## 2018-03-05 RX ORDER — LISINOPRIL 10 MG/1
10 TABLET ORAL DAILY
Qty: 90 TABLET | Refills: 3 | Status: SHIPPED | OUTPATIENT
Start: 2018-03-05 | End: 2019-02-25

## 2018-03-05 ASSESSMENT — PAIN SCALES - GENERAL: PAINLEVEL: MILD PAIN (3)

## 2018-03-05 NOTE — PROGRESS NOTES
SUBJECTIVE:    Neelam Miller is a 62 year old female who presents for her annual exam.  Nursing Notes:   Chelsey Gutierrez LPN  3/5/2018  8:47 AM  Signed  Patient presents to the clinic today for a px.    Previous A1C is at goal of <8  No results found for: A1C  Urine microalbumin:creatine: 1/13/17  Foot exam has appointment today.   Eye exam 9/2017    Tobacco User no   Patient is on a daily aspirin  Patient is on a Statin.  Blood pressure today of BP:    HR:  is at the goal of <139/89 for diabetics.       Chelsey Gutierrez LPN.................. 3/5/2018 8:30 AM      HPI: Neelam Miller is a 62 year old female presents for her annual exam.  She has type 2 diabetes - due for labs today.    Last pap: has had hysterectomy   Immunizations:  Up to date   Mammogram due in May  Colon cancer screening up to date - due 2025        PROBLEM LIST:  Patient Active Problem List   Diagnosis     Perennial allergic rhinitis     Bunion, left     Diabetes mellitus, type II (H)     Fungal dermatitis     Ganglion cyst     Hyperlipidemia     Hypothyroidism     Knee pain     Microalbuminuria     Obesity     Sleep apnea     Tear of right glenoid labrum     Vitamin D deficiency     PAST MEDICAL HISTORY:  Past Medical History:   Diagnosis Date     Allergic rhinitis     No Comments Provided     Essential (primary) hypertension     No Comments Provided     Hormone replacement therapy (postmenopausal)     5/04, discontinued     Hyperlipidemia     No Comments Provided     Hypothyroidism     No Comments Provided     Major depressive disorder, single episode     03/2012     Obesity     No Comments Provided     Sleep apnea     wears cpap prn     Systemic lupus erythematosus (H)     pt denies-tests were neg     Type 2 or unspecified type diabetes mellitus     Type 2     SURGICAL HISTORY:  Past Surgical History:   Procedure Laterality Date     CHOLECYSTECTOMY      1979     COLONOSCOPY      10/5/04     COLONOSCOPY      3/11/15     HYSTERECTOMY TOTAL  ABDOMINAL, BILATERAL SALPINGO-OOPHORECTOMY, COMBINED      02,for uterine fibroids     LAPAROSCOPIC BYPASS GASTRIC      2007,lap-band     MAMMOPLASTY REDUCTION      2/11/10     OTHER SURGICAL HISTORY      1979,31151.0,([place]) IR CHOLANGIOGRAM TRANSHEPATIC       SOCIAL HISTORY:  Social History     Social History     Marital status: Single     Spouse name: Bibi     Number of children: N/A     Years of education: 18+     Occupational History     PROFESSOR Other     Social History Main Topics     Smoking status: Never Smoker     Smokeless tobacco: Never Used     Alcohol use 0.0 oz/week     Drug use: Not on file      Comment: Drug use: No     Sexual activity: Yes     Partners: Female     Other Topics Concern     Not on file     Social History Narrative    Has PhD in music history; Working on masters program in theology at [employer] - completed 2017  Teaches at [employer].  Partner:  Bibi     FAMILY HISTORY:  Family History   Problem Relation Age of Onset     HEART DISEASE Father      Heart Disease, at 80, had pacemaker, history of MI     DIABETES Father      Diabetes,Type 2     Family History Negative Sister      Good Health, younger sister     Family History Negative Sister      Good Health, younger sister     Hypertension Brother      Hypertension, younger brother     DIABETES Mother 40     Diabetes     HEART DISEASE Mother      Heart Disease, age 61 of CHF     DIABETES Paternal Grandmother      Diabetes,Type 2     HEART DISEASE Brother      Heart Disease,pericarditis     Family History Negative Sister      Good Health, younger sister     CURRENT MEDICATIONS:   Current Outpatient Prescriptions   Medication Sig Dispense Refill     aspirin 81 MG tablet Take 81 mg by mouth daily with food       Calcium-Vitamin D-Vitamin K 500-100-40 MG-UNT-MCG CHEW Take 2 tablets by mouth daily       clotrimazole-betamethasone (LOTRISONE) cream Apply to nose daily as needed       Thyroid 130 MG TABS Take 1 tablet by mouth  daily       hydrochlorothiazide (HYDRODIURIL) 25 MG tablet Take 25 mg by mouth daily       lisinopril (PRINIVIL/ZESTRIL) 20 MG tablet Take 20 mg by mouth daily       Multiple Vitamin (MULTI-VITAMINS) TABS Take 1 tablet by mouth daily       simvastatin (ZOCOR) 20 MG tablet Take 20 mg by mouth At Bedtime       B Complex Vitamins (VITAMIN-B COMPLEX) TABS Take 1 tablet by mouth daily       zinc gluconate 30 MG TABS Take 1 capsule by mouth daily       Ascorbic Acid 1000 MG TABS Take  by mouth. 1000 mg daily       ERGOCALCIFEROL 10,000 Units daily.       fluconazole (DIFLUCAN) 100 MG tablet Take 100 mg by mouth as needed.       Magnesium 400 MG CAPS Take 400 mg by mouth daily.       Omega-3 Fatty Acids (OMEGA-3 EPA FISH OIL PO) Take  by mouth. 350-400 mg cap  1 grm daily       [DISCONTINUED] hydrochlorothiazide (HYDRODIURIL) 25 MG tablet Take 25 mg by mouth daily.       [DISCONTINUED] lisinopril (PRINIVIL,ZESTRIL) 20 MG tablet Take 20 mg by mouth daily.       [DISCONTINUED] simvastatin (ZOCOR) 20 MG tablet Take 1 tablet by mouth daily.       ALLERGIES:  Alkylamines; Corn dextrin; Corn oil; Gluten; Gluten meal; Lac bovis; Milk protein; Mold; and Wheat bran     REVIEW OF SYSTEMS:  General: goes to gym three times a week now  Wt Readings from Last 4 Encounters:   03/05/18 240 lb 9.6 oz (109.1 kg)   06/30/17 245 lb 8 oz (111.4 kg)   01/13/17 244 lb 3.2 oz (110.8 kg)   07/15/15 241 lb (109.3 kg)     Eyes: glasses, checked yearly  Ears/Nose/Throat: lastdentist visit utd  Cardiovascular: denies problems  Respiratory: denies problems  Gastrointestinal: denies problems; colonoscopy is up to date - due 2025   Genitourinary: denies problems  Musculoskeletal: right shoulder, still aches, has had P.T; will be going to chiropractor too (hasn't gone in 4 months); right MCL strain after tackled by her dog  Skin: recurrent yeast  Neurologic: denies problems  Psychiatric: denies problems  Endocrine: due for thyroid and type 2 diabetes  "check ; she hasn't always been able to get the consistently with [facility]   Heme/Lymphatic: denies problems  Allergic/Immunologic: denies problems  PHQ-2 Score:     PHQ-2 ( 1999 Pfizer) 3/5/2018   Q1: Little interest or pleasure in doing things 0   Q2: Feeling down, depressed or hopeless 0   PHQ-2 Score 0       OBJECTIVE:  /88 (BP Location: Right arm, Patient Position: Sitting, Cuff Size: Adult Large)  Pulse 68  Ht 5' 8\" (1.727 m)  Wt 240 lb 9.6 oz (109.1 kg)  Breastfeeding? No  BMI 36.58 kg/m2   EXAM:   General Appearance: Pleasant, alert, appropriate appearance for age. No acute distress  Head Exam: Normal. Normocephalic, atraumatic.  Eye Exam:  Normal external eye, conjunctiva,lids, cornea. LUCILLE.  Ear Exam: Normal TM's bilaterally. Normal auditory canals and external ears. Non-tender.  Nose Exam: Normal external nose, mucus membranes, and septum.  OroPharynx Exam:  Dental hygieneadequate. Normal buccal mucose. Normal pharynx.  Neck Exam:  Supple, no masses or nodes.  Thyroid Exam: No nodules or enlargement.  Chest/Respiratory Exam: Normal chest wall and respirations. Clear to auscultation.  Breast Exam: previous reduction/scars  Cardiovascular Exam: Regular rate and rhythm. S1, S2, no murmur, click, gallop, or rubs.  Abdomen - lapband port noted  Musculoskeletal Exam: Back is straight and non-tender, full ROM of upper and lower extremities.  FootExam: Left and right foot: good pedal pulses, no lesions, nail hygiene good.  Monofilament testing is normal  Skin: Mild bilateral axillary rash  Neurologic Exam: Nonfocal, symmetric DTRs, normal gross motor, tone coordination and no tremor.  Psychiatric Exam: Alert and oriented - appropriate affect.    ASSESSMENT/PLAN    1. Routine general medical examination at a health care facility    2. Type 2 diabetes mellitus without complication, without long-term current use of insulin (H)    3. Hyperlipidemia, unspecified hyperlipidemia type    4. Acquired hypothyroidism  "   5. Microalbuminuria    6. Obstructive sleep apnea syndrome    7. Vitamin D deficiency    8. Yeast infection      1.  Labs due today include her diabetes labs, TSH, vitamin D.  2.  Continue same medications at this time  3.  He was Asians are reviewed, these are up-to-date  4.  Mammogram will be due in May.  Other healthcare maintenance is up-to-date    Flori Perry MD

## 2018-03-05 NOTE — MR AVS SNAPSHOT
"              After Visit Summary   3/5/2018    Neelam Miller    MRN: [id number]           Patient Information     Date Of Birth          1955        Visit Information        Provider Department      3/5/2018 8:15 AM Flori Carney MD [facility]        Today's Diagnoses     Routine general medical examination at a health care facility    -  1    Type 2 diabetes mellitus without complication, without long-term current use of insulin (H)        Hyperlipidemia, unspecified hyperlipidemia type        Acquired hypothyroidism        Microalbuminuria        Obstructive sleep apnea syndrome        Vitamin D deficiency        Yeast infection        Essential hypertension           Follow-ups after your visit        Who to contact     If you have questions or need follow up information about today's clinic visit or your schedule please contact [facility] directly at [phone].  Normal or non-critical lab and imaging results will be communicated to you by HackHandshart, letter or phone within 4 business days after the clinic has received the results. If you do not hear from us within 7 days, please contact the clinic through HackHandshart or phone. If you have a critical or abnormal lab result, we will notify you by phone as soon as possible.  Submit refill requests through Swipp or call your pharmacy and they will forward the refill request to us. Please allow 3 business days for your refill to be completed.          Additional Information About Your Visit        HackHandsharZazuba Information     Swipp lets you send messages to your doctor, view your test results, renew your prescriptions, schedule appointments and more. To sign up, go to [email or web address] . Click on \"Log in\" on the left side of the screen, which will take you to the Welcome page. Then click on \"Sign up Now\" on the right side of the page.     You will be asked to enter the access code listed below, as " "well as some personal information. Please follow the directions to create your username and password.     Your access code is: [email or web address]  Expires: 6/3/2018 10:08 AM     Your access code will  in 90 days. If you need help or a new code, please call your [facility] clinic or [phone].        Care EveryWhere ID     This is your Care EveryWhere ID. This could be used by other organizations to access your [facility] medical records  [id number]        Your Vitals Were     Pulse Height Breastfeeding? BMI (Body Mass Index)          68 5' 8\" (1.727 m) No 36.58 kg/m2         Blood Pressure from Last 3 Encounters:   18 132/88   17 124/72   17 122/70    Weight from Last 3 Encounters:   18 240 lb 9.6 oz (109.1 kg)   17 245 lb 8 oz (111.4 kg)   17 244 lb 3.2 oz (110.8 kg)              We Performed the Following     Albumin Random Urine Quantitative with Creat Ratio     Comprehensive metabolic panel (BMP + Alb, Alk Phos, ALT, AST, Total. Bili, TP)     Hemoglobin A1c     Lipid Profile (Chol, Trig, HDL, LDL calc) - FASTING     TSH GH     Vitamin D Total GH          Today's Medication Changes          These changes are accurate as of 3/5/18 10:08 AM.  If you have any questions, ask your nurse or doctor.               These medicines have changed or have updated prescriptions.        Dose/Directions    ascorbic acid 1000 MG Tabs tablet   This may have changed:  Another medication with the same name was removed. Continue taking this medication, and follow the directions you see here.   Changed by:  Flori Carney MD        Take  by mouth. 1000 mg daily   Refills:  0       aspirin 81 MG tablet   This may have changed:  Another medication with the same name was removed. Continue taking this medication, and follow the directions you see here.   Changed by:  Flori Carney MD        Dose:  81 mg   Take 81 mg by mouth daily with food   Refills:  0       ERGOCALCIFEROL "   This may have changed:  Another medication with the same name was removed. Continue taking this medication, and follow the directions you see here.   Changed by:  Flori Carney MD        Dose:  73043 Units   10,000 Units daily.   Refills:  0       fluconazole 100 MG tablet   Commonly known as:  DIFLUCAN   This may have changed:  Another medication with the same name was removed. Continue taking this medication, and follow the directions you see here.   Used for:  Yeast infection   Changed by:  Flori Carney MD        Dose:  100 mg   Take 1 tablet (100 mg) by mouth as needed   Quantity:  60 tablet   Refills:  5       hydrochlorothiazide 25 MG tablet   Commonly known as:  HYDRODIURIL   This may have changed:  Another medication with the same name was removed. Continue taking this medication, and follow the directions you see here.   Used for:  Type 2 diabetes mellitus without complication, without long-term current use of insulin (H)   Changed by:  Flori Carney MD        Dose:  25 mg   Take 1 tablet (25 mg) by mouth daily   Quantity:  90 tablet   Refills:  3       lisinopril 10 MG tablet   Commonly known as:  PRINIVIL/ZESTRIL   This may have changed:    - medication strength  - how much to take  - Another medication with the same name was removed. Continue taking this medication, and follow the directions you see here.   Used for:  Essential hypertension   Changed by:  Flori Carney MD        Dose:  10 mg   Take 1 tablet (10 mg) by mouth daily   Quantity:  90 tablet   Refills:  3       Magnesium 400 MG Caps   This may have changed:  Another medication with the same name was removed. Continue taking this medication, and follow the directions you see here.   Changed by:  Flori Carney MD        Dose:  400 mg   Take 400 mg by mouth daily.   Refills:  0       MULTI-VITAMINS Tabs   This may have changed:  Another medication with the same name was  removed. Continue taking this medication, and follow the directions you see here.   Changed by:  Flori Carney MD        Dose:  1 tablet   Take 1 tablet by mouth daily   Refills:  0       simvastatin 20 MG tablet   Commonly known as:  ZOCOR   This may have changed:  Another medication with the same name was removed. Continue taking this medication, and follow the directions you see here.   Used for:  Hyperlipidemia, unspecified hyperlipidemia type   Changed by:  Flori Carney MD        Dose:  20 mg   Take 1 tablet (20 mg) by mouth At Bedtime   Quantity:  90 tablet   Refills:  3       Vitamin-B Complex Tabs   This may have changed:  Another medication with the same name was removed. Continue taking this medication, and follow the directions you see here.   Changed by:  Flori Carney MD        Dose:  1 tablet   Take 1 tablet by mouth daily   Refills:  0       zinc gluconate 30 MG Tabs   This may have changed:  Another medication with the same name was removed. Continue taking this medication, and follow the directions you see here.   Changed by:  Flori Carney MD        Dose:  1 capsule   Take 1 capsule by mouth daily   Refills:  0         Stop taking these medicines if you haven't already. Please contact your care team if you have questions.     DOCOSAHEXAENOIC ACID PO   Stopped by:  Flori Carney MD           econazole nitrate 1 % cream   Stopped by:  Flori Carney MD           LACTOBACILLUS PO   Stopped by:  Flori Carney MD           PROBIOTIC FORMULA PO   Stopped by:  Flori Carney MD           THYROID (PORK) PO   Stopped by:  Flori Carney MD           UNABLE TO FIND   Stopped by:  Flori Carney MD                Where to get your medicines      These medications were sent to [facility] - [place], [place] - [address]  [address] [place] [address]     Phone:  [phone]     fluconazole 100 MG tablet    hydrochlorothiazide 25 MG tablet    lisinopril 10 MG tablet    simvastatin 20 MG tablet    Thyroid 130 MG Tabs                Primary Care Provider Office Phone # Fax #    Flori DEE Jewel Dash [phone] [address] [place] [address]        Equal Access to Services     [facility]: Hadii aad ku hadasho Soomaali, waaxda luqadaha, qaybta kaalmada adeegyada, waxay idiin hayaan adeeg christianne lacasn . So [facility] [phone].    ATENCIÓN: Si habla español, tiene a plascencia disposición servicios gratuitos de asistencia lingüística. Llame al [phone].    We comply with applicable federal civil rights laws and [place] laws. We do not discriminate on the basis of race, color, national origin, age, disability, sex, sexual orientation, or gender identity.            Thank you!     Thank you for choosing [facility]  for your care. Our goal is always to provide you with excellent care. Hearing back from our patients is one way we can continue to improve our services. Please take a few minutes to complete the written survey that you may receive in the mail after your visit with us. Thank you!             Your Updated Medication List - Protect others around you: Learn how to safely use, store and throw away your medicines at www.disposemymeds.org.          This list is accurate as of 3/5/18 10:08 AM.  Always use your most recent med list.                   Brand Name Dispense Instructions for use Diagnosis    ascorbic acid 1000 MG Tabs tablet      Take  by mouth. 1000 mg daily        aspirin 81 MG tablet      Take 81 mg by mouth daily with food        Calcium-Vitamin D-Vitamin K 500-100-40 MG-UNT-MCG Chew      Take 2 tablets by mouth daily        clotrimazole-betamethasone cream    LOTRISONE     Apply to nose daily as needed        ERGOCALCIFEROL      10,000 Units daily.        fluconazole 100 MG  tablet    DIFLUCAN    60 tablet    Take 1 tablet (100 mg) by mouth as needed    Yeast infection       hydrochlorothiazide 25 MG tablet    HYDRODIURIL    90 tablet    Take 1 tablet (25 mg) by mouth daily    Type 2 diabetes mellitus without complication, without long-term current use of insulin (H)       lisinopril 10 MG tablet    PRINIVIL/ZESTRIL    90 tablet    Take 1 tablet (10 mg) by mouth daily    Essential hypertension       Magnesium 400 MG Caps      Take 400 mg by mouth daily.        MULTI-VITAMINS Tabs      Take 1 tablet by mouth daily        OMEGA-3 EPA FISH OIL PO      Take  by mouth. 350-400 mg cap 1 grm daily        simvastatin 20 MG tablet    ZOCOR    90 tablet    Take 1 tablet (20 mg) by mouth At Bedtime    Hyperlipidemia, unspecified hyperlipidemia type       Thyroid 130 MG Tabs     90 tablet    Take 1 tablet by mouth daily    Acquired hypothyroidism       Vitamin-B Complex Tabs      Take 1 tablet by mouth daily        zinc gluconate 30 MG Tabs      Take 1 capsule by mouth daily

## 2018-03-05 NOTE — NURSING NOTE
Patient presents to the clinic today for a px.    Previous A1C is at goal of <8  No results found for: A1C  Urine microalbumin:creatine: 1/13/17  Foot exam has appointment today.   Eye exam 9/2017    Tobacco User no   Patient is on a daily aspirin  Patient is on a Statin.  Blood pressure today of BP:    HR:  is at the goal of <139/89 for diabetics.       Chelsey Gutierrez LPN.................. 3/5/2018 8:30 AM

## 2018-03-05 NOTE — LETTER
March 5, 2018      Neelam HERNANDEZ Angela  [address] [facility] [address]        Dear Neelam,     Here is a copy of your recent labs:    Results for orders placed or performed in visit on 03/05/18   Comprehensive metabolic panel (BMP + Alb, Alk Phos, ALT, AST, Total. Bili, TP)   Result Value Ref Range    Sodium 140 134 - 144 mmol/L    Potassium 3.8 3.5 - 5.1 mmol/L    Chloride 102 98 - 107 mmol/L    Carbon Dioxide 30 21 - 31 mmol/L    Anion Gap 8 3 - 14 mmol/L    Glucose 119 (H) 70 - 105 mg/dL    Urea Nitrogen 13 7 - 25 mg/dL    Creatinine 0.84 0.60 - 1.20 mg/dL    GFR Estimate 68 >60 mL/min/1.7m2    GFR Estimate If Black 83 >60 mL/min/1.7m2    Calcium 9.3 8.6 - 10.3 mg/dL    Bilirubin Total 0.6 0.3 - 1.0 mg/dL    Albumin 4.5 3.5 - 5.7 g/dL    Protein Total 8.2 6.4 - 8.9 g/dL    Alkaline Phosphatase 51 34 - 104 U/L    ALT 40 7 - 52 U/L    AST 29 13 - 39 U/L   Hemoglobin A1c   Result Value Ref Range    Hemoglobin A1C 6.2 (H) 4.0 - 6.0 %   Lipid Profile (Chol, Trig, HDL, LDL calc) - FASTING   Result Value Ref Range    Cholesterol 148 <200 mg/dL    Triglycerides 182 (H) <150 mg/dL    HDL Cholesterol 36 23 - 92 mg/dL    LDL Cholesterol Calculated 76 <100 mg/dL    Non HDL Cholesterol 112 <130 mg/dL   Vitamin D Total GH   Result Value Ref Range    Vitamin D Total 89.3 ng/mL   TSH GH   Result Value Ref Range    Thyrotropin 1.98 0.34 - 5.60 IU/mL         These labs are normal - no medication changes needed.       Sincerely,        KANDI MASTERS MD

## 2018-03-07 ENCOUNTER — MYC MEDICAL ADVICE (OUTPATIENT)
Dept: FAMILY MEDICINE | Facility: OTHER | Age: 63
End: 2018-03-07

## 2018-04-05 DIAGNOSIS — E03.9 ACQUIRED HYPOTHYROIDISM: ICD-10-CM

## 2018-04-05 NOTE — TELEPHONE ENCOUNTER
Filled 03/05/18#90 x 3 to [facility]. Pharmacy alerted.   Unable to complete prescription refill per RNMedication Refill Policy.................... Clementina Damon ....................  4/5/2018   9:27 AM    Thyroid 130 MG TABS 90 tablet 3 3/5/2018  No   Sig: Take 1 tablet by mouth daily   Class: E-Prescribe   Route: Oral   Order: [id number]   E-Prescribing Status: Receipt confirmed by pharmacy (3/5/2018  8:44 AM CST

## 2018-05-22 ENCOUNTER — MEDICAL CORRESPONDENCE (OUTPATIENT)
Dept: HEALTH INFORMATION MANAGEMENT | Facility: OTHER | Age: 63
End: 2018-05-22

## 2018-06-08 ENCOUNTER — HOSPITAL ENCOUNTER (OUTPATIENT)
Dept: MAMMOGRAPHY | Facility: OTHER | Age: 63
Discharge: HOME | End: 2018-06-08
Attending: FAMILY MEDICINE | Admitting: FAMILY MEDICINE
Payer: COMMERCIAL

## 2018-06-08 DIAGNOSIS — Z12.31 VISIT FOR SCREENING MAMMOGRAM: ICD-10-CM

## 2018-06-08 PROCEDURE — 77067 SCR MAMMO BI INCL CAD: CPT

## 2018-07-23 NOTE — PROGRESS NOTES
Patient Information     Patient Name  Neelam Miller MRN  [id number] Sex  Female   1955      Letter by Flori Hollingsworth MD at      Author:  Flori Hollingsworth MD Service:  (none) Author Type:  (none)    Filed:   Encounter Date:  2018 Status:  (Other)           Neelam Miller  [id number]  [place] [address]          2018    Dear Ms. Miller:    This is to remind you that you are due for your annual medication management office visit with Flori Perry MD. Your last annual visit was on 2017.     Additional refills of your medication require you to complete this visit.    Please call [phone] to schedule your appointment.    Thank you for choosing [facility] for your health care needs.    Sincerely,      Refill RN  [facility]

## 2018-07-23 NOTE — PROGRESS NOTES
Patient Information     Patient Name  Neelam Miller MRN  [id number] Sex  Female   1955      Letter by David Vega MD at      Author:  David Vega MD Service:  (none) Author Type:  (none)    Filed:   Date of Service:   Status:  (Other)       [facility]  [address]  [place] [address]  [phone]         Neelam Miller   [address]  [facility] [address]      May 25, 2017  Date of Breast Imagin2017  1:32 PM    Dear Ms. Miller:    We are pleased to inform you that the result of your recent breast imaging examination is normal/benign (not cancer).    A report of your results was sent to your health care provider(s).    Your images will become part of your medical file here at [facility] and will be available for your continuing care. You are responsible for informing any new health care provider or breast imaging facility of the date and location of this examination.    Although mammography is the most accurate method for early detection, not all cancers are found through mammography. If you notice any new changes in your breast(s) please inform your health care provider without delay.    Thank you for choosing [facility] to participate in your healthcare needs.         [facility] Recommendations for Early Breast Cancer Detection   in Women without Symptoms  When to start having mammograms to screen for breast cancer, and how often to have them, is a personal decision. It should be based on your preferences, your values and your risk for developing breast cancer. [facility] recommends that you and your health care provider together determine when mammograms are right for you.    [facility] recommends the following guidelines for women who have an average risk for breast cancer, based on American Cancer Society guidelines:    Age 40 to 44: Mammograms are  optional.     Age 45 to 54: Have a mammogram every year.           Age 55 and older: Have a mammogram every year, or transition to having one every 2 years. Continue to have mammograms as long as your health is good.    If you have a higher than average risk for breast cancer, your health care provider may recommend a different schedule.

## 2018-07-24 DIAGNOSIS — S89.91XS INJURY OF RIGHT LOWER EXTREMITY, SEQUELA: Primary | ICD-10-CM

## 2018-07-24 NOTE — PROGRESS NOTES
Patient Information     Patient Name  Neelam Miller MRN  [id number] Sex  Female   1955      Letter by Flori Hollingsworth MD at      Author:  Flori Hollingsworth MD Service:  (none) Author Type:  (none)    Filed:   Encounter Date:  2018 Status:  (Other)           Neelam Miller  [address]  [place] [address]          2018    Dear Ms. Miller:    This is to remind you that you are due for your annual medication management office visit with Flori Perry MD.     Additional refills of your medication require you to complete this visit.    Please call [phone] to schedule your appointment.    Thank you for choosing [facility] for your health care needs.    Sincerely,      Refill RN  [facility]

## 2018-08-22 ENCOUNTER — TRANSFERRED RECORDS (OUTPATIENT)
Dept: HEALTH INFORMATION MANAGEMENT | Facility: OTHER | Age: 63
End: 2018-08-22

## 2019-02-25 DIAGNOSIS — E78.5 HYPERLIPIDEMIA, UNSPECIFIED HYPERLIPIDEMIA TYPE: ICD-10-CM

## 2019-02-25 DIAGNOSIS — I10 ESSENTIAL HYPERTENSION: ICD-10-CM

## 2019-02-25 DIAGNOSIS — E11.9 TYPE 2 DIABETES MELLITUS WITHOUT COMPLICATION, WITHOUT LONG-TERM CURRENT USE OF INSULIN (H): ICD-10-CM

## 2019-02-26 RX ORDER — LISINOPRIL 10 MG/1
10 TABLET ORAL DAILY
Qty: 90 TABLET | Refills: 3 | Status: SHIPPED | OUTPATIENT
Start: 2019-02-26 | End: 2019-12-24

## 2019-02-26 RX ORDER — SIMVASTATIN 20 MG
20 TABLET ORAL AT BEDTIME
Qty: 90 TABLET | Refills: 3 | Status: SHIPPED | OUTPATIENT
Start: 2019-02-26 | End: 2020-02-18

## 2019-02-26 NOTE — TELEPHONE ENCOUNTER
Lisinopril and Simvastatin   LOV and labs-03/05/2018    Future Office visit:    Next 5 appointments (look out 90 days)    Mar 19, 2019 10:45 AM CDT  PHYSICAL with Flori Dash MD  [facility] ([facility]) [address]  [place] [address]  [phone]           Routing refill request to provider for review/approval.    Unable to complete prescription refill per RNMedication Refill Policy.................... Clementina Damon ....................  2/26/2019   4:07 PM

## 2019-02-27 RX ORDER — HYDROCHLOROTHIAZIDE 25 MG/1
25 TABLET ORAL DAILY
Qty: 90 TABLET | Refills: 3 | Status: SHIPPED | OUTPATIENT
Start: 2019-02-27 | End: 2019-12-24

## 2019-02-27 NOTE — TELEPHONE ENCOUNTER
HCTZ      LOV and labs-03/05/2018    Future Office visit:    Next 5 appointments (look out 90 days)    Mar 19, 2019 10:45 AM CDT  PHYSICAL with Flori Dash MD  [facility] ([facility]) [address]  [place] [address]  [phone]           Routing refill request to provider for review/approval.     Unable to complete prescription refill per RNMedication Refill Policy.................... Clementina Damon ....................  2/27/2019   11:14 AM

## 2019-03-19 ENCOUNTER — OFFICE VISIT (OUTPATIENT)
Dept: FAMILY MEDICINE | Facility: OTHER | Age: 64
End: 2019-03-19
Attending: FAMILY MEDICINE
Payer: COMMERCIAL

## 2019-03-19 VITALS
HEART RATE: 72 BPM | BODY MASS INDEX: 36.07 KG/M2 | RESPIRATION RATE: 16 BRPM | SYSTOLIC BLOOD PRESSURE: 112 MMHG | TEMPERATURE: 97.8 F | DIASTOLIC BLOOD PRESSURE: 80 MMHG | HEIGHT: 68 IN | WEIGHT: 238 LBS

## 2019-03-19 DIAGNOSIS — E55.9 VITAMIN D DEFICIENCY: ICD-10-CM

## 2019-03-19 DIAGNOSIS — Z98.84 LAP-BAND SURGERY STATUS: ICD-10-CM

## 2019-03-19 DIAGNOSIS — Z00.00 PHYSICAL EXAM, ANNUAL: Primary | ICD-10-CM

## 2019-03-19 DIAGNOSIS — E03.9 ACQUIRED HYPOTHYROIDISM: ICD-10-CM

## 2019-03-19 DIAGNOSIS — E11.9 TYPE 2 DIABETES MELLITUS WITHOUT COMPLICATION, WITHOUT LONG-TERM CURRENT USE OF INSULIN (H): ICD-10-CM

## 2019-03-19 DIAGNOSIS — I10 ESSENTIAL HYPERTENSION: ICD-10-CM

## 2019-03-19 LAB
ALBUMIN SERPL-MCNC: 4.7 G/DL (ref 3.5–5.7)
ALP SERPL-CCNC: 63 U/L (ref 34–104)
ALT SERPL W P-5'-P-CCNC: 31 U/L (ref 7–52)
ANION GAP SERPL CALCULATED.3IONS-SCNC: 7 MMOL/L (ref 3–14)
AST SERPL W P-5'-P-CCNC: 26 U/L (ref 13–39)
BILIRUB SERPL-MCNC: 0.6 MG/DL (ref 0.3–1)
BUN SERPL-MCNC: 18 MG/DL (ref 7–25)
CALCIUM SERPL-MCNC: 9.8 MG/DL (ref 8.6–10.3)
CHLORIDE SERPL-SCNC: 102 MMOL/L (ref 98–107)
CHOLEST SERPL-MCNC: 149 MG/DL
CO2 SERPL-SCNC: 31 MMOL/L (ref 21–31)
CREAT SERPL-MCNC: 0.87 MG/DL (ref 0.6–1.2)
CREAT UR-MCNC: 223 MG/DL
DEPRECATED CALCIDIOL+CALCIFEROL SERPL-MC: 66.9 NG/ML
GFR SERPL CREATININE-BSD FRML MDRD: 66 ML/MIN/{1.73_M2}
GLUCOSE SERPL-MCNC: 119 MG/DL (ref 70–105)
HBA1C MFR BLD: 6.4 % (ref 4–6)
HDLC SERPL-MCNC: 35 MG/DL (ref 23–92)
LDLC SERPL CALC-MCNC: 81 MG/DL
MICROALBUMIN UR-MCNC: 17 MG/L
MICROALBUMIN/CREAT UR: 7.49 MG/G CR (ref 0–25)
NONHDLC SERPL-MCNC: 114 MG/DL
POTASSIUM SERPL-SCNC: 3.8 MMOL/L (ref 3.5–5.1)
PROT SERPL-MCNC: 8.9 G/DL (ref 6.4–8.9)
SODIUM SERPL-SCNC: 140 MMOL/L (ref 134–144)
TRIGL SERPL-MCNC: 166 MG/DL
TSH SERPL DL<=0.05 MIU/L-ACNC: 9.26 IU/ML (ref 0.34–5.6)

## 2019-03-19 PROCEDURE — 36415 COLL VENOUS BLD VENIPUNCTURE: CPT | Performed by: FAMILY MEDICINE

## 2019-03-19 PROCEDURE — 80061 LIPID PANEL: CPT | Performed by: FAMILY MEDICINE

## 2019-03-19 PROCEDURE — 84443 ASSAY THYROID STIM HORMONE: CPT | Performed by: FAMILY MEDICINE

## 2019-03-19 PROCEDURE — 99212 OFFICE O/P EST SF 10 MIN: CPT | Mod: 25 | Performed by: FAMILY MEDICINE

## 2019-03-19 PROCEDURE — 83036 HEMOGLOBIN GLYCOSYLATED A1C: CPT | Performed by: FAMILY MEDICINE

## 2019-03-19 PROCEDURE — 80053 COMPREHEN METABOLIC PANEL: CPT | Performed by: FAMILY MEDICINE

## 2019-03-19 PROCEDURE — 82306 VITAMIN D 25 HYDROXY: CPT | Performed by: FAMILY MEDICINE

## 2019-03-19 PROCEDURE — 99396 PREV VISIT EST AGE 40-64: CPT | Performed by: FAMILY MEDICINE

## 2019-03-19 PROCEDURE — 82043 UR ALBUMIN QUANTITATIVE: CPT | Performed by: FAMILY MEDICINE

## 2019-03-19 ASSESSMENT — MIFFLIN-ST. JEOR: SCORE: 1678.06

## 2019-03-19 ASSESSMENT — PAIN SCALES - GENERAL: PAINLEVEL: SEVERE PAIN (6)

## 2019-03-19 NOTE — NURSING NOTE
Patient presents to the clinic today for a px.   Medication Reconciliation: complete     .Chelsey Gutierrez LPN.................. 3/19/2019 10:58 AM

## 2019-03-19 NOTE — PROGRESS NOTES
SUBJECTIVE:  Nursing Notes:   Chelsey Gutierrez LPN  3/19/2019 11:05 AM  Signed  Patient presents to the clinic today for a px.   Medication Reconciliation: complete     .Chelsey Gutierrez LPN.................. 3/19/2019 10:58 AM      Neelam Miller is a 63 year old female who presents for her annual exam.    HPI: Neelam Miller is a 63 year old female presents for her annual exam.    History of gastric lapband.  In October she had a cold with bad cough.  Since then, it seems as if things are taking longer to get through. This can be her medications.  She's had times late at night that she awakens with food coming back up, choking. Doesn't feel heartburn unless she is getting sick.  She's hadn't had her band checked for years.  She can belch loudly.      Patient has hypothyroidism.  She is consistent with taking her thyroid replacement medication.    She has had vitamin D deficiency, is on supplemental replacement.  Patient has type 2 diabetes.  Has not needed to be on medication for this.  She is on aspirin and is on simvastatin.  Eye exam is up-to-date.    Hypertension.  She takes lisinopril 10 mg a day and hydrochlorothiazide 25 mg a day.  She denies chest pain and palpitations.    Last pap: hysterectomy   Immunizations: Up-to-date  Mammogram 6/8/18  Colon cancer screening 2015     Lab Results   Component Value Date    CHOL 148 03/05/2018     Lab Results   Component Value Date    HDL 36 03/05/2018     Lab Results   Component Value Date    LDL 76 03/05/2018     Lab Results   Component Value Date    TRIG 182 03/05/2018     No results found for: CHOLHDLRATIO    PROBLEM LIST:  Patient Active Problem List   Diagnosis     Perennial allergic rhinitis     Bunion, left     Fungal dermatitis     Ganglion cyst     Hyperlipidemia     Hypothyroidism     Knee pain     Microalbuminuria     Obesity     Sleep apnea     Tear of right glenoid labrum     Vitamin D deficiency     Type 2 diabetes mellitus without complication, without  long-term current use of insulin (H)     PAST MEDICAL HISTORY:  Past Medical History:   Diagnosis Date     Allergic rhinitis     No Comments Provided     Essential (primary) hypertension     No Comments Provided     Hormone replacement therapy (postmenopausal)     5/04, discontinued     Hyperlipidemia     No Comments Provided     Hypothyroidism     No Comments Provided     Major depressive disorder, single episode     03/2012     Obesity     No Comments Provided     Sleep apnea     wears cpap prn     Systemic lupus erythematosus (H)     pt denies-tests were neg     Type 2 diabetes mellitus without complication, without long-term current use of insulin (H) 3/5/2018     Type 2 or unspecified type diabetes mellitus     Type 2     SURGICAL HISTORY:  Past Surgical History:   Procedure Laterality Date     CHOLECYSTECTOMY      1979     COLONOSCOPY      10/5/04     COLONOSCOPY      3/11/15     COLONOSCOPY  03/18/2015     HYSTERECTOMY TOTAL ABDOMINAL, BILATERAL SALPINGO-OOPHORECTOMY, COMBINED      06/18/02,for uterine fibroids     LAPAROSCOPIC BYPASS GASTRIC      12/2007,lap-band     MAMMOPLASTY REDUCTION      2/11/10     OTHER SURGICAL HISTORY      1979,33166.0,([place]) IR CHOLANGIOGRAM TRANSHEPATIC       SOCIAL HISTORY:  Social History     Socioeconomic History     Marital status: Single     Spouse name: Bibi     Number of children: Not on file     Years of education: 18+     Highest education level: Not on file   Occupational History     Occupation:      Comment: [employer]   Social Needs     Financial resource strain: Not on file     Food insecurity:     Worry: Not on file     Inability: Not on file     Transportation needs:     Medical: Not on file     Non-medical: Not on file   Tobacco Use     Smoking status: Never Smoker     Smokeless tobacco: Never Used   Substance and Sexual Activity     Alcohol use: Yes     Alcohol/week: 0.0 oz     Drug use: Unknown     Types: Other     Comment: Drug use: No      Sexual activity: Yes     Partners: Female   Lifestyle     Physical activity:     Days per week: Not on file     Minutes per session: Not on file     Stress: Not on file   Relationships     Social connections:     Talks on phone: Not on file     Gets together: Not on file     Attends Congregation service: Not on file     Active member of club or organization: Not on file     Attends meetings of clubs or organizations: Not on file     Relationship status: Not on file     Intimate partner violence:     Fear of current or ex partner: Not on file     Emotionally abused: Not on file     Physically abused: Not on file     Forced sexual activity: Not on file   Other Topics Concern     Not on file   Social History Narrative    Has PhD in music history; Masters program in theology at [employer] - completed 2017  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  Partner:  Bibi De Santiago.     FAMILY HISTORY:  Family History   Problem Relation Age of Onset     Heart Disease Father         Heart Disease, at 80, had pacemaker, history of MI     Diabetes Father         Diabetes,Type 2     Thyroid Disease Sister      Family History Negative Sister         Good Health, younger sister     Hypertension Brother         Hypertension, younger brother     Diabetes Mother 40        Diabetes     Heart Disease Mother         Heart Disease, age 61 of CHF     Heart Disease Brother         Heart Disease,pericarditis     Family History Negative Sister         Good Health, younger sister     Diabetes Maternal Grandmother      CURRENT MEDICATIONS:   Current Outpatient Medications   Medication Sig Dispense Refill     Ascorbic Acid 1000 MG TABS Take  by mouth. 1000 mg daily       aspirin 81 MG tablet Take 81 mg by mouth daily with food       B Complex Vitamins (VITAMIN-B COMPLEX) TABS Take 1 tablet by mouth daily       Calcium-Vitamin D-Vitamin K 500-100-40 MG-UNT-MCG CHEW Take 2 tablets by mouth daily       clotrimazole-betamethasone (LOTRISONE) cream Apply to  "nose daily as needed       ERGOCALCIFEROL 10,000 Units daily.       fluconazole (DIFLUCAN) 100 MG tablet Take 1 tablet (100 mg) by mouth as needed 60 tablet 5     hydrochlorothiazide (HYDRODIURIL) 25 MG tablet TAKE 1 TABLET (25 MG) BY MOUTH DAILY 90 tablet 3     lisinopril (PRINIVIL/ZESTRIL) 10 MG tablet TAKE 1 TABLET (10 MG) BY MOUTH DAILY 90 tablet 3     Magnesium 400 MG CAPS Take 400 mg by mouth daily.       Multiple Vitamin (MULTI-VITAMINS) TABS Take 1 tablet by mouth daily       Omega-3 Fatty Acids (OMEGA-3 EPA FISH OIL PO) Take  by mouth. 350-400 mg cap  1 grm daily       simvastatin (ZOCOR) 20 MG tablet TAKE 1 TABLET (20 MG) BY MOUTH AT BEDTIME 90 tablet 3     zinc gluconate 30 MG TABS Take 1 capsule by mouth daily       thyroid (ARMOUR) 180 MG tablet Take 1 tablet (180 mg) by mouth daily 90 tablet 3     ALLERGIES:  Alkylamines; Corn dextrin; Corn oil; Gluten; Gluten meal; Lac bovis; Milk protein; Mold; and Wheat bran     REVIEW OF SYSTEMS:  General: denies any general problems.  Eyes: start of cataracts, up to date   Ears/Nose/Throat: last dentist visit was October ; abscess last year   Cardiovascular: denies problems, hypertension stable  Respiratory: denies problems  Gastrointestinal: see above; colonoscopy is up to date    Genitourinary: denies problems  Musculoskeletal: shoulder   Skin: recurring yeast infections  Neurologic: denies problems  Psychiatric: Improved  Endocrine: type 2 diabetes and thyroid disease  Heme/Lymphatic: denies problems  Allergic/Immunologic: denies problems    PHQ-2 Score:     PHQ-2 ( 1999 [employer]) 3/19/2019 3/5/2018   Q1: Little interest or pleasure in doing things 0 0   Q2: Feeling down, depressed or hopeless 0 0   PHQ-2 Score 0 0       No flowsheet data found.      PHQ-9 SCORE 7/15/2015 11/20/2015   PHQ-9 Total Score 4 2       OBJECTIVE:  /80   Pulse 72   Temp 97.8  F (36.6  C) (Tympanic)   Resp 16   Ht 1.727 m (5' 8\")   Wt 108 kg (238 lb)   Breastfeeding? No   BMI " 36.19 kg/m     EXAM:   General Appearance: Pleasant, alert, appropriate appearance for age. No acute distress  Head Exam: Normal. Normocephalic, atraumatic.  Eye Exam:  Normal external eye, conjunctiva,lids, cornea. LUCILLE.  Ear Exam: Normal TM's bilaterally. Normal auditory canals and external ears. Non-tender.  Nose Exam: Normal external nose, mucus membranes, and septum.  OroPharynx Exam:  Dental hygieneadequate. Normal buccal mucose. Normal pharynx.  Neck Exam:  Supple, no masses or nodes.  Thyroid Exam: No nodules or enlargement.  Chest/Respiratory Exam: Normal chest wall and respirations. Clear to auscultation.  Breast Exam: No dimpling, nipple retraction or discharge. No masses or nodes.  Cardiovascular Exam: Regular rate and rhythm. S1, S2, no murmur, click, gallop, or rubs.  Gastrointestinal Exam: Very mild epigastric tenderness.  Her lap band port is palpable, nontender, no erythema  Lymphatic Exam: Non-palpable nodes in neck, clavicular, axillary, or inguinal regions.  Musculoskeletal Exam: Mild anterior right shoulder tenderness.  Foot Exam: Left and right foot: good pedal pulses, no lesions, nail hygiene good.  Skin: no rash or abnormalities  Neurologic Exam: Nonfocal, symmetric DTRs, normal gross motor, tone coordination and no tremor.  Psychiatric Exam: Alert and oriented - appropriate affect.    ASSESSMENT/PLAN    ICD-10-CM    1. Physical exam, annual Z00.00    2. Acquired hypothyroidism E03.9 Thyrotropin GH     Thyrotropin GH     DISCONTINUED: Thyroid 130 MG TABS   3. Type 2 diabetes mellitus without complication, without long-term current use of insulin (H) E11.9 Hemoglobin A1c     Comprehensive Metabolic Panel     Albumin Random Urine Quantitative with Creat Ratio     Lipid Panel     Lipid Panel     Comprehensive Metabolic Panel     Hemoglobin A1c     Albumin Random Urine Quantitative with Creat Ratio   4. LAP-BAND surgery status Z98.84 GENERAL SURG ADULT REFERRAL   5. Vitamin D deficiency E55.9  Vitamin D Total     Vitamin D Total   6. Essential hypertension I10        PLAN:  1.  Labs due today include TSH, A1c, metabolic panel, lipid panel, urine microalbumin, vitamin D.  2.  Continue exercise, dietary measures for diabetes control.  3.  Bariatric surgery referral given concerns regarding lap band.  Concern for band related side effects/complications.  At this point, patient is not sure how much if any fluid she still has in her band.  She will also need evaluation for band slippage.  4.  Anticipate patient continuing on simvastatin, aspirin.  Patient will be continued on same antihypertensive medication.  She will be continued on thyroid medicine, pending lab results.  5.  Patient will be turning 64 years old this summer.  Some initial information is given regarding Medicare covered services and welcome to Medicare visit.  KANDI MASTERS MD on 3/19/2019 at 11:06 AM

## 2019-03-19 NOTE — PATIENT INSTRUCTIONS
Services Typically covered by Medicare Recommended Completed   Vaccines    Pneumonoccol    Influenza    Hepatitis B (if medium/high risk)     Once for patients after age 65    Yearly  Medium/high risk factors:    End Stage Kidney Disease    Hemophiliacs who received Factor XIII or IX concentrates    Clients of institutions for developmentally disabled    Persons who live in same house as a Hepatitis B carrier    Homosexual men    Illicit injectable drug users    Health care workers     Mammogram Covered: One-time screen between age 35-39, annually for age 40+     Pap and Pelvic Exam Covered: Annually if  high risk,  or childbearing age with abnormal Pap in last 3 years.  Q24 months for all other women     Prostate Cancer Screening    Digital rectal exam    PSA Covered: Annually for all men > age 50     Corolrectal Cancer Screening Screening colonoscopy every 10 years, more often for high risk patients     Diabetes Self-Management Training Requires referral by treating physician for patient with diabetes     Diabetes Screening    Fasting blood sugar or glucose tolerance test   Once yearly, twice yearly if prediabetic     Cardiovascular Screening Blood Tests    Total Cholesterol    HDL    Triglycerides Every 5 years     Medical Nutrition Therapy for Diabetes or Renal Disease Requires referral by treating physician for patient with diabetes or kidney disease     Glaucoma Screening Annually for patients with one of the following risk factors:    Diabetes Mellitus    Family history of Glaucoma    -American age 50 and over    -American age 65 and over     Bone Mass Measurement Every 24 months if one of the following risk factors:    Estrogen deficiency    Vertebral abnormalities on x-ray indicative of Osteoporosis, Osteopenia, or Vertebral fracture    Receiving/expected to receive the equivalent of at least 5 mg of Prednisone per day for > 3 months    Hyperparathyroidism    Patient being monitored for  response to Osteoporosis Therapy     One-time AAA screen  Must be ordered as part of Medicare IPPE   Any patient with a family history of AAA    Males Age 65-75, with history of smoking at least 100 cigarettes in lifetime     Smoking Cessation Counseling Beneficiaries who use tobacco are eligible to receive 2 cessation attempts per year; each attempt includes maximum of 4 sessions     HIV Screening Annually for beneficiaries at increased risk:       Increased risk for HIV infection is defined in the  National Coverage Determinations (NCD) Manual,  Publication 100-03 Sections 190.14 (diagnostic) and 210.7 (screening). See http://www.cms.gov/manuals/downloads/ngp525u2_Ecpy9.pdf and http://www.cms.gov/manuals/downloads/enw615r4_Zxsi1.pdf on the Internet.  Three times per pregnancy for beneficiaries who are pregnant.     Future Annual Wellness Visit Annually, for all beneficiaries.

## 2019-03-21 ENCOUNTER — MYC MEDICAL ADVICE (OUTPATIENT)
Dept: FAMILY MEDICINE | Facility: OTHER | Age: 64
End: 2019-03-21

## 2019-03-22 ENCOUNTER — MYC MEDICAL ADVICE (OUTPATIENT)
Dept: FAMILY MEDICINE | Facility: OTHER | Age: 64
End: 2019-03-22

## 2019-03-22 NOTE — TELEPHONE ENCOUNTER
Called pharmacy. They did not receive prescription. When I tried to give a verbal prescription, He stated it does not come in the strength of 180 mg. They can do 130 mg, 162.5 mg, or 178.75 mg.     Chelsey Gutierrez LPN.................. 3/22/2019 12:08 PM

## 2019-03-25 ENCOUNTER — TELEPHONE (OUTPATIENT)
Dept: FAMILY MEDICINE | Facility: OTHER | Age: 64
End: 2019-03-25

## 2019-03-25 NOTE — TELEPHONE ENCOUNTER
Per patient it should be [facility] Throid. Pharmacy notified.     Chelsey Gutierrez LPN.................. 3/25/2019 11:05 AM

## 2019-03-25 NOTE — TELEPHONE ENCOUNTER
Pharmacy called. He states patient was previously prescribed [facility]roid, and [place] [facility]roid was sent. Wondering if change was intentionally, or if they should dispense what patient was on previously?    Chelsey Gutierrez LPN.................. 3/25/2019 9:47 AM

## 2019-03-29 ENCOUNTER — MEDICAL CORRESPONDENCE (OUTPATIENT)
Dept: HEALTH INFORMATION MANAGEMENT | Facility: OTHER | Age: 64
End: 2019-03-29

## 2019-05-17 DIAGNOSIS — E03.9 ACQUIRED HYPOTHYROIDISM: ICD-10-CM

## 2019-05-17 LAB — TSH SERPL DL<=0.05 MIU/L-ACNC: 0.82 IU/ML (ref 0.34–5.6)

## 2019-05-17 PROCEDURE — 84443 ASSAY THYROID STIM HORMONE: CPT | Performed by: FAMILY MEDICINE

## 2019-05-17 PROCEDURE — 36415 COLL VENOUS BLD VENIPUNCTURE: CPT | Performed by: FAMILY MEDICINE

## 2019-06-05 ENCOUNTER — HOSPITAL ENCOUNTER (OUTPATIENT)
Dept: GENERAL RADIOLOGY | Facility: OTHER | Age: 64
Discharge: HOME | End: 2019-06-05
Attending: NURSE PRACTITIONER | Admitting: NURSE PRACTITIONER
Payer: COMMERCIAL

## 2019-06-05 ENCOUNTER — OFFICE VISIT (OUTPATIENT)
Dept: FAMILY MEDICINE | Facility: OTHER | Age: 64
End: 2019-06-05
Attending: NURSE PRACTITIONER
Payer: COMMERCIAL

## 2019-06-05 ENCOUNTER — MYC MEDICAL ADVICE (OUTPATIENT)
Dept: FAMILY MEDICINE | Facility: OTHER | Age: 64
End: 2019-06-05

## 2019-06-05 VITALS
BODY MASS INDEX: 36.53 KG/M2 | DIASTOLIC BLOOD PRESSURE: 78 MMHG | WEIGHT: 241 LBS | TEMPERATURE: 97.8 F | SYSTOLIC BLOOD PRESSURE: 128 MMHG | HEIGHT: 68 IN | HEART RATE: 65 BPM | RESPIRATION RATE: 20 BRPM | OXYGEN SATURATION: 95 %

## 2019-06-05 DIAGNOSIS — J45.20 MILD INTERMITTENT ASTHMA, UNSPECIFIED WHETHER COMPLICATED: ICD-10-CM

## 2019-06-05 DIAGNOSIS — R05.9 COUGH: Primary | ICD-10-CM

## 2019-06-05 DIAGNOSIS — R05.9 COUGH: ICD-10-CM

## 2019-06-05 DIAGNOSIS — R06.2 WHEEZING: ICD-10-CM

## 2019-06-05 DIAGNOSIS — J18.9 PNEUMONIA OF RIGHT LOWER LOBE DUE TO INFECTIOUS ORGANISM: ICD-10-CM

## 2019-06-05 PROCEDURE — G0463 HOSPITAL OUTPT CLINIC VISIT: HCPCS | Mod: 25

## 2019-06-05 PROCEDURE — 99214 OFFICE O/P EST MOD 30 MIN: CPT | Performed by: NURSE PRACTITIONER

## 2019-06-05 PROCEDURE — 25000125 ZZHC RX 250: Performed by: NURSE PRACTITIONER

## 2019-06-05 PROCEDURE — 94640 AIRWAY INHALATION TREATMENT: CPT | Performed by: NURSE PRACTITIONER

## 2019-06-05 PROCEDURE — 71046 X-RAY EXAM CHEST 2 VIEWS: CPT

## 2019-06-05 RX ORDER — ALBUTEROL SULFATE 90 UG/1
2 AEROSOL, METERED RESPIRATORY (INHALATION) EVERY 6 HOURS
Qty: 8.5 G | Refills: 3 | Status: SHIPPED | OUTPATIENT
Start: 2019-06-05 | End: 2021-11-03

## 2019-06-05 RX ORDER — IPRATROPIUM BROMIDE AND ALBUTEROL SULFATE 2.5; .5 MG/3ML; MG/3ML
3 SOLUTION RESPIRATORY (INHALATION)
Status: ACTIVE | OUTPATIENT
Start: 2019-06-05

## 2019-06-05 RX ORDER — AZITHROMYCIN 250 MG/1
TABLET, FILM COATED ORAL
Qty: 6 TABLET | Refills: 0 | Status: SHIPPED | OUTPATIENT
Start: 2019-06-05 | End: 2019-09-11

## 2019-06-05 RX ORDER — PREDNISONE 20 MG/1
40 TABLET ORAL DAILY
Qty: 10 TABLET | Refills: 0 | Status: SHIPPED | OUTPATIENT
Start: 2019-06-05 | End: 2019-09-11

## 2019-06-05 RX ADMIN — IPRATROPIUM BROMIDE AND ALBUTEROL SULFATE 3 ML: .5; 3 SOLUTION RESPIRATORY (INHALATION) at 11:52

## 2019-06-05 ASSESSMENT — ENCOUNTER SYMPTOMS
COUGH: 1
CHILLS: 1
WHEEZING: 1
SHORTNESS OF BREATH: 1
FEVER: 1
SORE THROAT: 1
SINUS PAIN: 1
SINUS PRESSURE: 0

## 2019-06-05 ASSESSMENT — MIFFLIN-ST. JEOR: SCORE: 1691.67

## 2019-06-05 ASSESSMENT — PAIN SCALES - GENERAL: PAINLEVEL: NO PAIN (0)

## 2019-06-05 NOTE — NURSING NOTE
Duoneb ordered by Tania Gonzales NP.  Medication administered per written order  Lot # [id number]  Exp. 01/01/21  NDC 4299-8583-46  Patient tolerated well.  Samina Knapp LPN............6/5/2019 11:54 AM

## 2019-06-05 NOTE — PROGRESS NOTES
joseph  SUBJECTIVE:   Neelam Miller is a 63 year old female who presents to clinic today for the following health issues:    HPI  Patient presents for evaluation of URI symptoms. She reports being ill since May 4th with sore throat. She improved for the month of April, but then her partner became ill and then URI symptoms started. She estimates she has had this illness for one month. Symptoms include productive cough, fever/chills and sinus congestion. She has no smoking history. She reports no asthma history, but uses albuterol inhalers occasionally. She has been treating with OTC medicine and nasal irrigations with little improvement. She reports coughing so hard at times that she will gag herself. She has had pneumonia in the past and this feels similar.     Patient Active Problem List    Diagnosis Date Noted     Type 2 diabetes mellitus without complication, without long-term current use of insulin (H) 03/05/2018     Priority: Medium     Hyperlipidemia 01/19/2018     Priority: Medium     Hypothyroidism 01/19/2018     Priority: Medium     Microalbuminuria 01/19/2018     Priority: Medium     Overview:   elevated urine       Obesity 01/19/2018     Priority: Medium     Overview:   weight loss management       Sleep apnea 01/19/2018     Priority: Medium     Overview:   has nasal CPAP       Tear of right glenoid labrum 07/17/2017     Priority: Medium     Bunion, left 02/09/2015     Priority: Medium     Perennial allergic rhinitis 09/05/2013     Priority: Medium     Fungal dermatitis 03/20/2012     Priority: Medium     Ganglion cyst 06/06/2011     Priority: Medium     Knee pain 10/12/2010     Priority: Medium     Vitamin D deficiency 06/16/2010     Priority: Medium     Past Medical History:   Diagnosis Date     Allergic rhinitis     No Comments Provided     Essential (primary) hypertension     No Comments Provided     Hormone replacement therapy (postmenopausal)     5/04, discontinued     Hyperlipidemia     No Comments  "Provided     Hypothyroidism     No Comments Provided     Major depressive disorder, single episode     03/2012     Obesity     No Comments Provided     Sleep apnea     wears cpap prn     Systemic lupus erythematosus (H)     pt denies-tests were neg     Type 2 diabetes mellitus without complication, without long-term current use of insulin (H) 3/5/2018     Type 2 or unspecified type diabetes mellitus     Type 2      Past Surgical History:   Procedure Laterality Date     CHOLECYSTECTOMY      1979     COLONOSCOPY      10/5/04     COLONOSCOPY      3/11/15     COLONOSCOPY  03/18/2015     HYSTERECTOMY TOTAL ABDOMINAL, BILATERAL SALPINGO-OOPHORECTOMY, COMBINED      06/18/02,for uterine fibroids     LAPAROSCOPIC BYPASS GASTRIC      12/2007,lap-band     MAMMOPLASTY REDUCTION      2/11/10     OTHER SURGICAL HISTORY      1979,08005.0,([place]) IR CHOLANGIOGRAM TRANSHEPATIC       Review of Systems   Constitutional: Positive for chills and fever.   HENT: Positive for congestion, sinus pain and sore throat. Negative for ear discharge, ear pain and sinus pressure.    Respiratory: Positive for cough, shortness of breath and wheezing.         OBJECTIVE:     /78 (BP Location: Right arm, Patient Position: Sitting, Cuff Size: Adult Large)   Pulse 65   Temp 97.8  F (36.6  C) (Tympanic)   Resp 20   Ht 1.727 m (5' 8\")   Wt 109.3 kg (241 lb)   SpO2 95%   Breastfeeding? No   BMI 36.64 kg/m    Body mass index is 36.64 kg/m .  Physical Exam   Constitutional: She appears well-developed and well-nourished. No distress.   HENT:   Right Ear: Tympanic membrane and external ear normal.   Left Ear: Tympanic membrane and external ear normal.   Eyes: Conjunctivae are normal.   Cardiovascular: Regular rhythm and normal heart sounds.   Pulmonary/Chest: Effort normal. No respiratory distress. She has wheezes.   Lymphadenopathy:     She has cervical adenopathy.     Diagnostic Test Results:  Xray - PROCEDURE:  XR CHEST 2 VW     HISTORY:  Cough. "      COMPARISON:  None.     FINDINGS:   The cardiac silhouette is normal in size. The pulmonary vasculature is  normal.  There are airspace opacities in the right mid and lower lung.  No pleural effusion or pneumothorax.                                                                      IMPRESSION:  Right lower lung infiltrate. Recommend follow-up to  ensure resolution.      ASSESSMENT/PLAN:   1. Cough  - XR Chest 2 Views; Future    2. Mild intermittent asthma, unspecified whether complicated  She can use albuterol as needed for wheezing or cough. We will also start her on a short burst of prednisone 40 mg x 5 days to help with cough.   - albuterol (PROAIR HFA/PROVENTIL HFA/VENTOLIN HFA) 108 (90 Base) MCG/ACT inhaler; Inhale 2 puffs into the lungs every 6 hours  Dispense: 8.5 g; Refill: 3  - predniSONE (DELTASONE) 20 MG tablet; Take 2 tablets (40 mg) by mouth daily for 5 days  Dispense: 10 tablet; Refill: 0    3. Wheezing  Neb given in clinic. After nebulizer completed LS improved. Patient reports breathing improved.   - ipratropium - albuterol 0.5 mg/2.5 mg/3 mL (DUONEB) neb solution 3 mL    4. Pneumonia of right lower lobe due to infectious organism (H)  Pneumonia seen on chest x-ray. No co-morbidities identified or recent antibiotic use, plan for treatment with antibiotics x 5 days.  She should continue to hydrate and rest. Discussed that patient should be re-evaluated if no improvement in next few days or worsening symptoms.  Follow-up with chest x-ray in 4-6 weeks to ensure resolution.   - azithromycin (ZITHROMAX) 250 MG tablet; Take 2 tablets (500 mg) by mouth daily for 1 day, THEN 1 tablet (250 mg) daily for 4 days.  Dispense: 6 tablet; Refill: 0      Tania Gonzales [facility] AND [facility]

## 2019-06-05 NOTE — NURSING NOTE
Patient presents to clinic experiencing productive cough, sinus drainage with pressure and fatigue for past month.    Medication Reconciliation: complete    Samina Knapp LPN

## 2019-06-11 ENCOUNTER — HOSPITAL ENCOUNTER (OUTPATIENT)
Dept: MAMMOGRAPHY | Facility: OTHER | Age: 64
Discharge: HOME | End: 2019-06-11
Attending: FAMILY MEDICINE | Admitting: FAMILY MEDICINE
Payer: COMMERCIAL

## 2019-06-11 DIAGNOSIS — Z12.31 VISIT FOR SCREENING MAMMOGRAM: ICD-10-CM

## 2019-06-11 PROCEDURE — 77063 BREAST TOMOSYNTHESIS BI: CPT

## 2019-06-24 ENCOUNTER — MYC MEDICAL ADVICE (OUTPATIENT)
Dept: FAMILY MEDICINE | Facility: OTHER | Age: 64
End: 2019-06-24

## 2019-06-24 DIAGNOSIS — J18.9 PNEUMONIA OF RIGHT LOWER LOBE DUE TO INFECTIOUS ORGANISM: Primary | ICD-10-CM

## 2019-06-24 NOTE — TELEPHONE ENCOUNTER
Patient was seen by Tania Gonzales NP on 6/5/19. OV states patient should have a repeat xray in 4-6 weeks. Can xray be ordered or should patient be seen?    Chelsey Gutierrez LPN.................. 6/24/2019 3:35 PM

## 2019-07-01 DIAGNOSIS — B37.9 YEAST INFECTION: ICD-10-CM

## 2019-07-02 ENCOUNTER — HOSPITAL ENCOUNTER (OUTPATIENT)
Dept: GENERAL RADIOLOGY | Facility: OTHER | Age: 64
Discharge: HOME | End: 2019-07-02
Attending: NURSE PRACTITIONER | Admitting: NURSE PRACTITIONER
Payer: COMMERCIAL

## 2019-07-02 DIAGNOSIS — J18.9 PNEUMONIA OF RIGHT LOWER LOBE DUE TO INFECTIOUS ORGANISM: ICD-10-CM

## 2019-07-02 PROCEDURE — 71046 X-RAY EXAM CHEST 2 VIEWS: CPT

## 2019-07-03 RX ORDER — FLUCONAZOLE 100 MG/1
100 TABLET ORAL PRN
Qty: 90 TABLET | Refills: 2 | Status: SHIPPED | OUTPATIENT
Start: 2019-07-03 | End: 2019-12-24

## 2019-07-03 NOTE — TELEPHONE ENCOUNTER
Routing refill request to provider for review/approval because:  Drug not on the [facility] refill protocol (unless reviewed in notes)    Fluconazole filled 3-5-18 for #60 X 5 refills. LOV 3-19-19. Samina Esquivel RN on 7/3/2019 at 4:21 PM

## 2019-08-22 ENCOUNTER — TRANSFERRED RECORDS (OUTPATIENT)
Dept: HEALTH INFORMATION MANAGEMENT | Facility: OTHER | Age: 64
End: 2019-08-22

## 2019-09-11 ENCOUNTER — OFFICE VISIT (OUTPATIENT)
Dept: FAMILY MEDICINE | Facility: OTHER | Age: 64
End: 2019-09-11
Attending: FAMILY MEDICINE
Payer: COMMERCIAL

## 2019-09-11 VITALS
HEIGHT: 68 IN | DIASTOLIC BLOOD PRESSURE: 82 MMHG | HEART RATE: 66 BPM | RESPIRATION RATE: 16 BRPM | WEIGHT: 233 LBS | BODY MASS INDEX: 35.31 KG/M2 | OXYGEN SATURATION: 98 % | TEMPERATURE: 96.9 F | SYSTOLIC BLOOD PRESSURE: 124 MMHG

## 2019-09-11 DIAGNOSIS — E11.9 TYPE 2 DIABETES MELLITUS WITHOUT COMPLICATION, WITHOUT LONG-TERM CURRENT USE OF INSULIN (H): ICD-10-CM

## 2019-09-11 DIAGNOSIS — Z98.84 LAP-BAND SURGERY STATUS: ICD-10-CM

## 2019-09-11 DIAGNOSIS — E03.9 ACQUIRED HYPOTHYROIDISM: ICD-10-CM

## 2019-09-11 DIAGNOSIS — I10 ESSENTIAL HYPERTENSION: ICD-10-CM

## 2019-09-11 DIAGNOSIS — Z01.818 PREOP GENERAL PHYSICAL EXAM: Primary | ICD-10-CM

## 2019-09-11 DIAGNOSIS — R13.19 OTHER DYSPHAGIA: ICD-10-CM

## 2019-09-11 DIAGNOSIS — G47.33 OSA (OBSTRUCTIVE SLEEP APNEA): ICD-10-CM

## 2019-09-11 LAB
ALBUMIN SERPL-MCNC: 4.5 G/DL (ref 3.5–5.7)
ALP SERPL-CCNC: 46 U/L (ref 34–104)
ALT SERPL W P-5'-P-CCNC: 33 U/L (ref 7–52)
ANION GAP SERPL CALCULATED.3IONS-SCNC: 6 MMOL/L (ref 3–14)
AST SERPL W P-5'-P-CCNC: 28 U/L (ref 13–39)
BILIRUB SERPL-MCNC: 0.4 MG/DL (ref 0.3–1)
BUN SERPL-MCNC: 13 MG/DL (ref 7–25)
CALCIUM SERPL-MCNC: 9.4 MG/DL (ref 8.6–10.3)
CHLORIDE SERPL-SCNC: 103 MMOL/L (ref 98–107)
CO2 SERPL-SCNC: 30 MMOL/L (ref 21–31)
CREAT SERPL-MCNC: 0.84 MG/DL (ref 0.6–1.2)
GFR SERPL CREATININE-BSD FRML MDRD: 68 ML/MIN/{1.73_M2}
GLUCOSE SERPL-MCNC: 99 MG/DL (ref 70–105)
HBA1C MFR BLD: 6.3 % (ref 4–6)
POTASSIUM SERPL-SCNC: 3.5 MMOL/L (ref 3.5–5.1)
PROT SERPL-MCNC: 7.8 G/DL (ref 6.4–8.9)
SODIUM SERPL-SCNC: 139 MMOL/L (ref 134–144)

## 2019-09-11 PROCEDURE — 99214 OFFICE O/P EST MOD 30 MIN: CPT | Performed by: FAMILY MEDICINE

## 2019-09-11 PROCEDURE — 80053 COMPREHEN METABOLIC PANEL: CPT | Mod: ZL | Performed by: FAMILY MEDICINE

## 2019-09-11 PROCEDURE — 36415 COLL VENOUS BLD VENIPUNCTURE: CPT | Mod: ZL | Performed by: FAMILY MEDICINE

## 2019-09-11 PROCEDURE — 83036 HEMOGLOBIN GLYCOSYLATED A1C: CPT | Mod: ZL | Performed by: FAMILY MEDICINE

## 2019-09-11 PROCEDURE — G0463 HOSPITAL OUTPT CLINIC VISIT: HCPCS

## 2019-09-11 ASSESSMENT — PAIN SCALES - GENERAL: PAINLEVEL: NO PAIN (0)

## 2019-09-11 ASSESSMENT — MIFFLIN-ST. JEOR: SCORE: 1650.38

## 2019-09-11 NOTE — NURSING NOTE
Chief Complaint   Patient presents with     Pre-Op Exam     EGD 09/20/19   Patient presents to the clinic today for a pre op     Medication Reconciliation: completed   Marivel Winters LPN  9/11/2019 2:03 PM

## 2019-09-11 NOTE — PROGRESS NOTES
[facility] AND [facility]  [address]  [place] [address]  [phone]  Dept: [phone]    PRE-OP EVALUATION:  Today's date: 2019    Neelam Miller (: 1955) presents for pre-operative evaluation assessment as requested by Dr. Meeks.  She requires evaluation and anesthesia risk assessment prior to undergoing surgery/procedure for treatment of EGD .    Proposed Surgery/ Procedure: EGD  Date of Surgery/ Procedure: 19  Time of Surgery/ Procedure:   Hospital/Surgical Facility: [facility]    Primary Physician: Flori Carney  Type of Anesthesia Anticipated: to be determined    Patient has a Health Care Directive or Living Will:  YES     1. NO - Do you have a history of heart attack, stroke, stent, bypass or surgery on an artery in the head, neck, heart or legs?  2. YES - DO YOU EVER HAVE ANY PAIN OR DISCOMFORT IN YOUR CHEST? A lot of physical activity   3. NO - Do you have a history of  Heart Failure?  4. NO - Are you troubled by shortness of breath when: walking on the level, up a slight hill or at night?  5. NO - Do you currently have a cold, bronchitis or other respiratory infection?  6. NO - Do you have a cough, shortness of breath or wheezing?  7. NO - Do you sometimes get pains in the calves of your legs when you walk?  8. NO - Do you or anyone in your family have previous history of blood clots?  9. NO - Do you or does anyone in your family have a serious bleeding problem such as prolonged bleeding following surgeries or cuts?  10. NO - Have you ever had problems with anemia or been told to take iron pills?  11. NO - Have you had any abnormal blood loss such as black, tarry or bloody stools, or abnormal vaginal bleeding?  12. NO - Have you ever had a blood transfusion?  13. NO - Have you or any of your relatives ever had problems with anesthesia?  14. YES - Do you have sleep apnea, excessive snoring or daytime drowsiness? Sleep Apnea  15. NO - Do  you have any prosthetic heart valves?  16. NO - Do you have prosthetic joints?  17. NO - Is there any chance that you may be pregnant?      HPI:     HPI related to upcoming procedure: Neelam Miller is a 64 year old female in for preoperative evaluation prior to EGD.  Medical history significant for lapband surgery.  With her last fill, it became so tight that she couldn't eat/tolerate it at all.  She then had half of it taken out and then continued to have restrictive symptoms with vomiting.  She then had most of the fluid taken out and continued to have problems with obstructive symptoms.    She had fluoro evaluation done last month and there was concern for ballooning out of esophagus (something above the diaphragm).  After this all of the fluid was taken out of band and now she can eat and drink without problems.        DIABETES - Patient has a longstanding history of DiabetesType Type II . Patient is being treated with diet and denies significant side effects. Control has been good. A1C is due today.    HYPERTENSION - Patient has longstanding history of HTN , currently denies any symptoms referable to elevated blood pressure. Specifically denies chest pain, palpitations, dyspnea, orthopnea, PND or peripheral edema. Blood pressure readings have been in normal range. Current medication regimen is as listed below. Patient denies any side effects of medication.     HYPOTHYROIDISM - Patient has a longstanding history of chronic Hypothyroidism. Patient has been doing well, noting no tremor, insomnia, hair loss or changes in skin texture. Continues to take medications as directed, without adverse reactions or side effects. Last TSH was normal range    Obstructive sleep apnea:  Uses CPAP    MEDICAL HISTORY:     Patient Active Problem List    Diagnosis Date Noted     Essential hypertension 09/11/2019     Priority: Medium     RICHAR (obstructive sleep apnea) 09/11/2019     Priority: Medium     Type 2 diabetes mellitus  without complication, without long-term current use of insulin (H) 03/05/2018     Priority: Medium     Hyperlipidemia 01/19/2018     Priority: Medium     Hypothyroidism 01/19/2018     Priority: Medium     Microalbuminuria 01/19/2018     Priority: Medium     Overview:   elevated urine       Obesity 01/19/2018     Priority: Medium     Overview:   weight loss management       Sleep apnea 01/19/2018     Priority: Medium     Overview:   has nasal CPAP       Tear of right glenoid labrum 07/17/2017     Priority: Medium     Bunion, left 02/09/2015     Priority: Medium     Perennial allergic rhinitis 09/05/2013     Priority: Medium     Fungal dermatitis 03/20/2012     Priority: Medium     Ganglion cyst 06/06/2011     Priority: Medium     Knee pain 10/12/2010     Priority: Medium     Vitamin D deficiency 06/16/2010     Priority: Medium      Past Medical History:   Diagnosis Date     Allergic rhinitis     No Comments Provided     Essential (primary) hypertension     No Comments Provided     Hormone replacement therapy (postmenopausal)     5/04, discontinued     Hyperlipidemia     No Comments Provided     Hypothyroidism     No Comments Provided     Major depressive disorder, single episode     03/2012     Obesity     No Comments Provided     Sleep apnea     wears cpap prn     Type 2 diabetes mellitus without complication, without long-term current use of insulin (H) 3/5/2018     Past Surgical History:   Procedure Laterality Date     CHOLECYSTECTOMY      1979     COLONOSCOPY      10/5/04     COLONOSCOPY      3/11/15     COLONOSCOPY  03/18/2015     HYSTERECTOMY TOTAL ABDOMINAL, BILATERAL SALPINGO-OOPHORECTOMY, COMBINED      06/18/02,for uterine fibroids     LAPAROSCOPIC BYPASS GASTRIC      12/2007,lap-band     MAMMOPLASTY REDUCTION      2/11/10     OTHER SURGICAL HISTORY      1979,48111.0,([place]) IR CHOLANGIOGRAM TRANSHEPATIC     Current Outpatient Medications   Medication Sig Dispense Refill     albuterol (PROAIR HFA/PROVENTIL  HFA/VENTOLIN HFA) 108 (90 Base) MCG/ACT inhaler Inhale 2 puffs into the lungs every 6 hours 8.5 g 3     Ascorbic Acid 1000 MG TABS Take  by mouth. 1000 mg daily       aspirin 81 MG tablet Take 81 mg by mouth daily with food       B Complex Vitamins (VITAMIN-B COMPLEX) TABS Take 1 tablet by mouth daily       Calcium-Vitamin D-Vitamin K 500-100-40 MG-UNT-MCG CHEW Take 2 tablets by mouth daily       clotrimazole-betamethasone (LOTRISONE) cream Apply to nose daily as needed       ERGOCALCIFEROL 10,000 Units daily.       fluconazole (DIFLUCAN) 100 MG tablet TAKE 1 TABLET (100 MG) BY MOUTH AS NEEDED 90 tablet 2     hydrochlorothiazide (HYDRODIURIL) 25 MG tablet TAKE 1 TABLET (25 MG) BY MOUTH DAILY 90 tablet 3     lisinopril (PRINIVIL/ZESTRIL) 10 MG tablet TAKE 1 TABLET (10 MG) BY MOUTH DAILY 90 tablet 3     Magnesium 400 MG CAPS Take 400 mg by mouth daily.       Multiple Vitamin (MULTI-VITAMINS) TABS Take 1 tablet by mouth daily       Omega-3 Fatty Acids (OMEGA-3 EPA FISH OIL PO) Take  by mouth. 350-400 mg cap  1 grm daily       simvastatin (ZOCOR) 20 MG tablet TAKE 1 TABLET (20 MG) BY MOUTH AT BEDTIME 90 tablet 3     thyroid (ARMOUR) 180 MG tablet Take 1 tablet (180 mg) by mouth daily 90 tablet 3     zinc gluconate 30 MG TABS Take 1 capsule by mouth daily           Allergies   Allergen Reactions     Alkylamines Other (See Comments)     Other reaction(s): Insomnia  Antihistamines  insomnia     Corn Dextrin      corn     Corn Oil Other (See Comments)     Allergy testing     Gluten      Gluten Meal Other (See Comments)     Allergy testing     Lac Bovis Nausea and Vomiting     Milk Protein      Milk       Mold Other (See Comments)     Allergy testing     Molds & Smuts Other (See Comments)     Allergy testing     Wheat Bran Other (See Comments)     Allergy testing          Social History     Tobacco Use     Smoking status: Never Smoker     Smokeless tobacco: Never Used   Substance Use Topics     Alcohol use: Yes      "Alcohol/week: 0.0 oz     History   Drug Use Unknown     Comment: Drug use: No       REVIEW OF SYSTEMS:   CONSTITUTIONAL: NEGATIVE for fever, chills, change in weight  ENT/MOUTH: NEGATIVE for ear, mouth and throat problems  RESP: Pneumonia in June, resolved  CV: NEGATIVE for chest pain, palpitations or peripheral edema  GI:  See above, she had been having increased flatulence   SKIN:  Was on fluconazole for the past 4 days     EXAM:   /82 (BP Location: Right arm, Patient Position: Sitting, Cuff Size: Adult Regular)   Pulse 66   Temp 96.9  F (36.1  C) (Tympanic)   Resp 16   Ht 1.727 m (5' 8\")   Wt 105.7 kg (233 lb)   SpO2 98%   BMI 35.43 kg/m     Wt Readings from Last 4 Encounters:   09/11/19 105.7 kg (233 lb)   06/05/19 109.3 kg (241 lb)   03/19/19 108 kg (238 lb)   03/05/18 109.1 kg (240 lb 9.6 oz)       GENERAL APPEARANCE: healthy, alert and no distress  HENT: ear canals and TM's normal and nose and mouth without ulcers or lesions  RESP: lungs clear to auscultation - no rales, rhonchi or wheezes  CV: regular rate and rhythm, normal S1 S2, no S3 or S4 and no murmur, click or rub   ABDOMEN: soft, nontender, no HSM or masses and bowel sounds normal -LAP-BAND palpable  NEURO: Normal strength and tone, sensory exam grossly normal, mentation intact and speech normal    DIAGNOSTICS:   EKG - NSR, normal rate    Recent Labs   Lab Test 03/19/19  1145 03/05/18  0920  02/09/15  1721   HGB  --   --   --  13.9   PLT  --   --   --  325    140   < >  --    POTASSIUM 3.8 3.8   < >  --    CR 0.87 0.84   < >  --    A1C 6.4* 6.2*  --   --     < > = values in this interval not displayed.      A1c results from today are pending  IMPRESSION:   Reason for surgery/procedure: Dysphasia, obstructive eating symptoms with concern for LAP-BAND slippage/malfunction  Diagnosis/reason for consult: Diabetes, hypertension, hypothyroidism, sleep apnea    The proposed surgical procedure is considered LOW risk.    REVISED CARDIAC " RISK INDEX  The patient has the following serious cardiovascular risks for perioperative complications such as (MI, PE, VFib and 3  AV Block):  No serious cardiac risks  INTERPRETATION: 1 risks: Class II (low risk - 0.9% complication rate)    The patient has the following additional risks for perioperative complications:  No identified additional risks      ICD-10-CM    1. Preop general physical exam Z01.818    2. Other dysphagia R13.19    3. LAP-BAND surgery status Z98.84    4. Type 2 diabetes mellitus without complication, without long-term current use of insulin (H) E11.9 Hemoglobin A1c     Comprehensive Metabolic Panel     Comprehensive Metabolic Panel     Hemoglobin A1c   5. Acquired hypothyroidism E03.9    6. Essential hypertension I10 EKG 12-LEAD CLINIC READ   7. RICHAR (obstructive sleep apnea) G47.33        RECOMMENDATIONS:         --Patient is to take all scheduled medications on the day of surgery   --Monitor patient post procedure for apnea/sleep apnea    APPROVAL GIVEN to proceed with proposed procedure, without further diagnostic evaluation       Signed Electronically by: KANDI MASTERS MD    Copy of this evaluation report is provided to requesting physician.    [facility] Preop Guidelines    Revised Cardiac Risk Index

## 2019-09-20 ENCOUNTER — TRANSFERRED RECORDS (OUTPATIENT)
Dept: HEALTH INFORMATION MANAGEMENT | Facility: OTHER | Age: 64
End: 2019-09-20

## 2019-11-06 ENCOUNTER — HEALTH MAINTENANCE LETTER (OUTPATIENT)
Age: 64
End: 2019-11-06

## 2019-12-17 ENCOUNTER — OFFICE VISIT (OUTPATIENT)
Dept: FAMILY MEDICINE | Facility: OTHER | Age: 64
End: 2019-12-17
Attending: FAMILY MEDICINE
Payer: COMMERCIAL

## 2019-12-17 ENCOUNTER — HOSPITAL ENCOUNTER (OUTPATIENT)
Dept: GENERAL RADIOLOGY | Facility: OTHER | Age: 64
Discharge: HOME | End: 2019-12-17
Attending: FAMILY MEDICINE | Admitting: FAMILY MEDICINE
Payer: COMMERCIAL

## 2019-12-17 VITALS
HEIGHT: 68 IN | SYSTOLIC BLOOD PRESSURE: 118 MMHG | BODY MASS INDEX: 36.98 KG/M2 | WEIGHT: 244 LBS | TEMPERATURE: 97.1 F | HEART RATE: 76 BPM | DIASTOLIC BLOOD PRESSURE: 86 MMHG | RESPIRATION RATE: 20 BRPM

## 2019-12-17 DIAGNOSIS — Z98.84 LAP-BAND SURGERY STATUS: ICD-10-CM

## 2019-12-17 DIAGNOSIS — M79.671 RIGHT FOOT PAIN: ICD-10-CM

## 2019-12-17 DIAGNOSIS — M85.80 AGE-RELATED BONE LOSS: ICD-10-CM

## 2019-12-17 DIAGNOSIS — G47.33 OSA (OBSTRUCTIVE SLEEP APNEA): ICD-10-CM

## 2019-12-17 DIAGNOSIS — E11.9 TYPE 2 DIABETES MELLITUS WITHOUT COMPLICATION, WITHOUT LONG-TERM CURRENT USE OF INSULIN (H): Primary | ICD-10-CM

## 2019-12-17 DIAGNOSIS — I10 ESSENTIAL HYPERTENSION: ICD-10-CM

## 2019-12-17 DIAGNOSIS — Z78.0 ASYMPTOMATIC MENOPAUSAL STATE: ICD-10-CM

## 2019-12-17 DIAGNOSIS — E03.9 ACQUIRED HYPOTHYROIDISM: ICD-10-CM

## 2019-12-17 DIAGNOSIS — K90.9 INTESTINAL MALABSORPTION, UNSPECIFIED TYPE: ICD-10-CM

## 2019-12-17 LAB
DEPRECATED CALCIDIOL+CALCIFEROL SERPL-MC: 54.5 NG/ML
ERYTHROCYTE [DISTWIDTH] IN BLOOD BY AUTOMATED COUNT: 12.8 % (ref 10–15)
HBA1C MFR BLD: 6.7 % (ref 4–6)
HCT VFR BLD AUTO: 41.8 % (ref 35–47)
HGB BLD-MCNC: 13.9 G/DL (ref 11.7–15.7)
MCH RBC QN AUTO: 30.8 PG (ref 26.5–33)
MCHC RBC AUTO-ENTMCNC: 33.3 G/DL (ref 31.5–36.5)
MCV RBC AUTO: 93 FL (ref 78–100)
PLATELET # BLD AUTO: 302 10E9/L (ref 150–450)
RBC # BLD AUTO: 4.52 10E12/L (ref 3.8–5.2)
TSH SERPL DL<=0.05 MIU/L-ACNC: 0.65 IU/ML (ref 0.34–5.6)
VIT B12 SERPL-MCNC: 1416 PG/ML (ref 180–914)
WBC # BLD AUTO: 6.4 10E9/L (ref 4–11)

## 2019-12-17 PROCEDURE — 83036 HEMOGLOBIN GLYCOSYLATED A1C: CPT | Mod: ZL | Performed by: FAMILY MEDICINE

## 2019-12-17 PROCEDURE — 85027 COMPLETE CBC AUTOMATED: CPT | Mod: ZL | Performed by: FAMILY MEDICINE

## 2019-12-17 PROCEDURE — G0463 HOSPITAL OUTPT CLINIC VISIT: HCPCS | Mod: 25

## 2019-12-17 PROCEDURE — 84443 ASSAY THYROID STIM HORMONE: CPT | Mod: ZL | Performed by: FAMILY MEDICINE

## 2019-12-17 PROCEDURE — 90670 PCV13 VACCINE IM: CPT

## 2019-12-17 PROCEDURE — 82306 VITAMIN D 25 HYDROXY: CPT | Mod: ZL | Performed by: FAMILY MEDICINE

## 2019-12-17 PROCEDURE — 36415 COLL VENOUS BLD VENIPUNCTURE: CPT | Mod: ZL | Performed by: FAMILY MEDICINE

## 2019-12-17 PROCEDURE — 82607 VITAMIN B-12: CPT | Mod: ZL | Performed by: FAMILY MEDICINE

## 2019-12-17 PROCEDURE — 99214 OFFICE O/P EST MOD 30 MIN: CPT | Performed by: FAMILY MEDICINE

## 2019-12-17 PROCEDURE — 84630 ASSAY OF ZINC: CPT | Mod: ZL | Performed by: FAMILY MEDICINE

## 2019-12-17 PROCEDURE — G0009 ADMIN PNEUMOCOCCAL VACCINE: HCPCS

## 2019-12-17 PROCEDURE — 73630 X-RAY EXAM OF FOOT: CPT | Mod: RT

## 2019-12-17 ASSESSMENT — MIFFLIN-ST. JEOR: SCORE: 1700.28

## 2019-12-17 ASSESSMENT — PAIN SCALES - GENERAL: PAINLEVEL: NO PAIN (0)

## 2019-12-17 NOTE — NURSING NOTE
Patient presents to the clinic today for a follow up on medications.   Med rec complete.  Chelsey Gutierrez LPN.................. 12/17/2019 11:32 AM

## 2019-12-17 NOTE — PROGRESS NOTES
Nursing Notes:   Chelsey Gutierrez LPN  12/17/2019 12:12 PM  Signed  Patient presents to the clinic today for a follow up on medications.   Med rec complete.  Chelsey Gutierrez LPN.................. 12/17/2019 11:32 AM      SUBJECTIVE:   CC:  Neelam Miller is a 64 year old female who presents to clinic today for the following health issues:  Medication follow up     HPI  Neelam Miller is a 64 year old female who presents for medication up.      Patient has type 2 diabetes.  She is on aspirin and statin.  She does not routinely check blood sugars.  She is not currently on medication for her diabetes.  Eye examination up-to-date.  Foot exam is due today.  She is previously had bariatric surgery/LAP-BAND to help with weight management related to her diabetes.  Please see additional notes below.    Hypothyroidism: She is currently taking [place]-Thyroid, 180 mg a day.    Previous bariatric/lapband surgery.  She is slowly having this more filled. She can tolerate her current degree of restriction.  This past year, she did have follow up EGD done because she was having problems with eating/swallowing.  With some of the restriction lifted in her lap band, she has been gaining weight back again.  She has some questions regarding additional lab evaluation related to nutrition, vitamins, mineral intake.  Wt Readings from Last 4 Encounters:   12/17/19 110.7 kg (244 lb)   09/11/19 105.7 kg (233 lb)   06/05/19 109.3 kg (241 lb)   03/19/19 108 kg (238 lb)       Now is age 64 - recommend dexa and starting pneumonia vaccines.      She has been more sleep deprived due to stress, partner's illness.      Sore right foot - right under the ankle.  This has been off and on for 2-3 months - maybe back to the summer.  It is a direct area of tenderness.  Better with shoes on.  No falls or trauma.    Hypertension: She is on lisinopril 10 mg daily and hydrochlorothiazide 25 mg daily.       Allergies   Allergen Reactions     Alkylamines Other  (See Comments)     Other reaction(s): Insomnia  Antihistamines  insomnia     Corn Dextrin      corn     Corn Oil Other (See Comments)     Allergy testing     Gluten      Gluten Meal Other (See Comments)     Allergy testing     Lac Bovis Nausea and Vomiting     Milk Protein      Milk       Mold Other (See Comments)     Allergy testing     Molds & Smuts Other (See Comments)     Allergy testing     Wheat Bran Other (See Comments)     Allergy testing     Current Outpatient Medications   Medication     albuterol (PROAIR HFA/PROVENTIL HFA/VENTOLIN HFA) 108 (90 Base) MCG/ACT inhaler     Ascorbic Acid 1000 MG TABS     aspirin 81 MG tablet     B Complex Vitamins (VITAMIN-B COMPLEX) TABS     Calcium-Vitamin D-Vitamin K 500-100-40 MG-UNT-MCG CHEW     clotrimazole-betamethasone (LOTRISONE) cream     ERGOCALCIFEROL     hydrochlorothiazide (HYDRODIURIL) 25 MG tablet     lisinopril (PRINIVIL/ZESTRIL) 10 MG tablet     Magnesium 400 MG CAPS     Multiple Vitamin (MULTI-VITAMINS) TABS     Omega-3 Fatty Acids (OMEGA-3 EPA FISH OIL PO)     simvastatin (ZOCOR) 20 MG tablet     zinc gluconate 30 MG TABS     NATURE-THROID 32.5 MG tablet     NATURE-THROID 65 MG tablet     Current Facility-Administered Medications   Medication     ipratropium - albuterol 0.5 mg/2.5 mg/3 mL (DUONEB) neb solution 3 mL      Past Medical History:   Diagnosis Date     Allergic rhinitis     No Comments Provided     Essential (primary) hypertension     No Comments Provided     Hormone replacement therapy (postmenopausal)     5/04, discontinued     Hyperlipidemia     No Comments Provided     Hypothyroidism     No Comments Provided     Major depressive disorder, single episode     03/2012     Obesity     No Comments Provided     Sleep apnea     wears cpap prn     Type 2 diabetes mellitus without complication, without long-term current use of insulin (H) 3/5/2018      Past Surgical History:   Procedure Laterality Date     CHOLECYSTECTOMY      1979     COLONOSCOPY       "10/5/04     COLONOSCOPY      3/11/15     COLONOSCOPY  2015     HYSTERECTOMY TOTAL ABDOMINAL, BILATERAL SALPINGO-OOPHORECTOMY, COMBINED      02,for uterine fibroids     LAPAROSCOPIC BYPASS GASTRIC      2007,lap-band     MAMMOPLASTY REDUCTION      2/11/10     OTHER SURGICAL HISTORY      1979,95921.0,([place]) IR CHOLANGIOGRAM TRANSHEPATIC     Family History   Problem Relation Age of Onset     Heart Disease Father         Heart Disease, at 80, had pacemaker, history of MI     Diabetes Father         Diabetes,Type 2     Thyroid Disease Sister      Family History Negative Sister         Good Health, younger sister     Hypertension Brother         Hypertension, younger brother     Diabetes Mother 40        Diabetes     Heart Disease Mother         Heart Disease, age 61 of CHF     Heart Disease Brother         Heart Disease,pericarditis     Family History Negative Sister         Good Health, younger sister     Diabetes Maternal Grandmother        Review of Systems   Constitutional: Positive for appetite change.   HENT: Positive for postnasal drip and rhinorrhea.    Eyes: Negative.    Respiratory:        Pneumonia this past summer   Cardiovascular: Negative.    Gastrointestinal:        Heartburn and swallowing symptoms improved   Endocrine: Positive for cold intolerance and heat intolerance.   Genitourinary: Positive for urgency.   Musculoskeletal: Negative.         Goes to the [facility] a few times a week   Allergic/Immunologic: Positive for environmental allergies.   Neurological: Negative.    Psychiatric/Behavioral: Positive for sleep disturbance.        PHQ-2 Score:     PHQ-2 (  [employer]) 2019   Q1: Little interest or pleasure in doing things 0 0   Q2: Feeling down, depressed or hopeless 0 0   PHQ-2 Score 0 0         PHQ-9 SCORE 7/15/2015 2015   PHQ-9 Total Score 4 2         OBJECTIVE:     /86   Pulse 76   Temp 97.1  F (36.2  C) (Tympanic)   Resp 20   Ht 1.727 m (5' 8\")   " Wt 110.7 kg (244 lb)   Breastfeeding No   BMI 37.10 kg/m    Body mass index is 37.1 kg/m .  Physical Exam  Vitals signs and nursing note reviewed.   Constitutional:       Appearance: Normal appearance. She is obese.   Cardiovascular:      Rate and Rhythm: Normal rate and regular rhythm.   Pulmonary:      Effort: Pulmonary effort is normal.      Breath sounds: Normal breath sounds.   Abdominal:      General: Bowel sounds are normal.      Palpations: Abdomen is soft.      Comments: LAP-BAND port palpable   Musculoskeletal:      Comments: Sensory testing of her right foot is normal.  She has some heel tenderness, also tenderness at the base of the fifth metatarsal.  X-ray of her foot is obtained today, this is negative for fracture.   Neurological:      Mental Status: She is alert.   Psychiatric:         Mood and Affect: Mood normal.      Study Result     PROCEDURE:  XR FOOT RT G/E 3 VW     HISTORY: Right foot pain     COMPARISON:  None.     TECHNIQUE:  3 views of the right foot were obtained.     FINDINGS:  No fracture or dislocation is identified. There are  degenerative changes in the MTP joints.                                                                        IMPRESSION: No acute fracture.       MANJINDER MORE MD         Results for orders placed or performed in visit on 12/17/19   Zinc     Status: None   Result Value Ref Range    Zinc 95.5 60.0 - 120.0 ug/dL   Hemoglobin A1c     Status: Abnormal   Result Value Ref Range    Hemoglobin A1C 6.7 (H) 4.0 - 6.0 %   TSH Reflex GH     Status: None   Result Value Ref Range    TSH Reflex 0.65 0.34 - 5.60 IU/mL   Vitamin B12     Status: Abnormal   Result Value Ref Range    Vitamin B12 1,416 (H) 180 - 914 pg/mL   Vitamin D Total     Status: None   Result Value Ref Range    Vitamin D Total 54.5 ng/mL   CBC W PLT No Diff     Status: None   Result Value Ref Range    WBC 6.4 4.0 - 11.0 10e9/L    RBC Count 4.52 3.8 - 5.2 10e12/L    Hemoglobin 13.9 11.7 - 15.7 g/dL     Hematocrit 41.8 35.0 - 47.0 %    MCV 93 78 - 100 fl    MCH 30.8 26.5 - 33.0 pg    MCHC 33.3 31.5 - 36.5 g/dL    RDW 12.8 10.0 - 15.0 %    Platelet Count 302 150 - 450 10e9/L         ASSESSMENT/PLAN:       ICD-10-CM    1. Type 2 diabetes mellitus without complication, without long-term current use of insulin (H) E11.9 Vitamin B12     Vitamin D Total     CBC W PLT No Diff     Zinc     Hemoglobin A1c     Hemoglobin A1c     Vitamin B12     Vitamin D Total     CBC W PLT No Diff     hydrochlorothiazide (HYDRODIURIL) 25 MG tablet   2. Acquired hypothyroidism E03.9 TSH Reflex GH     TSH Reflex GH   3. Essential hypertension I10 lisinopril (PRINIVIL/ZESTRIL) 10 MG tablet   4. LAP-BAND surgery status Z98.84 Vitamin B12     Vitamin D Total     CBC W PLT No Diff     Zinc     Hemoglobin A1c     Hemoglobin A1c     Vitamin B12     Vitamin D Total     CBC W PLT No Diff   5. RICHAR (obstructive sleep apnea) G47.33    6. Age-related bone loss M85.80 DX Hip/Pelvis/Spine   7. Asymptomatic menopausal state  Z78.0 DX Hip/Pelvis/Spine   8. Right foot pain M79.671 XR Foot Right G/E 3 Views   9. Body mass index (BMI) 35.0-35.9, adult  Z68.35 Vitamin D Total     Vitamin D Total   10. Intestinal malabsorption, unspecified type  K90.9 Zinc            PLAN:  1.  I have personally reviewed the labs listed above. I have personally reviewed the imaging test listed above.  2.  Diabetes currently at adequate control.  Concerned that her A1c is higher than at her last visits.  Concern for rebound weight gain with decreased restrictions of her LAP-BAND.  Discussed reviewed goals for weight management, be this with or without bariatric surgery/LAP-BAND.  3.  DEXA scan ordered at this time.  4.  X-ray reviewed with patient, possible sprain, no evidence of fracture.  5.  A copy of her vitamin mineral related labs is sent to her.  6.  Patient was sleep apnea, consistent with use of CPAP.  7.  Continue same treatment for hypertension.  8.  Continue with  aspirin, statin therapy.  9.  Prevnar vaccine updated today.  Follow-up in 1 year for pneumococcal vaccine.  Continue with annual flu vaccine.  10.  Continue same treatment for hypothyroidism.    Encourage patient to follow-up again in 3 months time, A1c recheck at that time, weight check at that time.  KANDI MASTERS MD  [facility] AND [facility]    This note was created using voice recognition software and was screened for errors in transcription.

## 2019-12-20 LAB — ZINC SERPL-MCNC: 95.5 UG/DL (ref 60–120)

## 2019-12-24 RX ORDER — THYROID,PORK 32.5 MG
TABLET ORAL
COMMUNITY
Start: 2019-09-24 | End: 2020-03-13

## 2019-12-24 RX ORDER — LISINOPRIL 10 MG/1
10 TABLET ORAL DAILY
Qty: 90 TABLET | Refills: 3 | Status: SHIPPED | OUTPATIENT
Start: 2019-12-24 | End: 2021-03-03

## 2019-12-24 RX ORDER — HYDROCHLOROTHIAZIDE 25 MG/1
25 TABLET ORAL DAILY
Qty: 90 TABLET | Refills: 3 | Status: SHIPPED | OUTPATIENT
Start: 2019-12-24 | End: 2021-03-03

## 2019-12-24 ASSESSMENT — ENCOUNTER SYMPTOMS
APPETITE CHANGE: 1
RHINORRHEA: 1
MUSCULOSKELETAL NEGATIVE: 1
SLEEP DISTURBANCE: 1
CARDIOVASCULAR NEGATIVE: 1
NEUROLOGICAL NEGATIVE: 1
EYES NEGATIVE: 1

## 2020-01-10 ENCOUNTER — HOSPITAL ENCOUNTER (OUTPATIENT)
Dept: BONE DENSITY | Facility: OTHER | Age: 65
Discharge: HOME | End: 2020-01-10
Attending: FAMILY MEDICINE | Admitting: FAMILY MEDICINE
Payer: COMMERCIAL

## 2020-01-10 DIAGNOSIS — M85.80 AGE-RELATED BONE LOSS: ICD-10-CM

## 2020-01-10 DIAGNOSIS — Z78.0 ASYMPTOMATIC MENOPAUSAL STATE: ICD-10-CM

## 2020-01-10 PROCEDURE — 77080 DXA BONE DENSITY AXIAL: CPT

## 2020-02-16 ENCOUNTER — HEALTH MAINTENANCE LETTER (OUTPATIENT)
Age: 65
End: 2020-02-16

## 2020-02-18 DIAGNOSIS — E78.5 HYPERLIPIDEMIA, UNSPECIFIED HYPERLIPIDEMIA TYPE: ICD-10-CM

## 2020-02-18 RX ORDER — SIMVASTATIN 20 MG
20 TABLET ORAL AT BEDTIME
Qty: 90 TABLET | Refills: 0 | Status: SHIPPED | OUTPATIENT
Start: 2020-02-18 | End: 2020-03-02

## 2020-02-18 NOTE — TELEPHONE ENCOUNTER
"Requested Prescriptions   Pending Prescriptions Disp Refills     SIMVASTATIN 20 MG PO tablet [Pharmacy Med Name: SIMVASTATIN 20 MG TABLET] 90 tablet 3     Sig: TAKE 1 TABLET (20 MG) BY MOUTH AT BEDTIME       Statins Protocol Passed - 2/18/2020  1:23 AM        Passed - LDL on file in past 12 months     Recent Labs   Lab Test 03/19/19  1145   LDL 81             Passed - No abnormal creatine kinase in past 12 months     No lab results found.             Passed - Recent (12 mo) or future (30 days) visit within the authorizing provider's specialty     Patient has had an office visit with the authorizing provider or a provider within the authorizing providers department within the previous 12 mos or has a future within next 30 days. See \"Patient Info\" tab in inbasket, or \"Choose Columns\" in Meds & Orders section of the refill encounter.              Passed - Medication is active on med list        Passed - Patient is age 18 or older        Passed - No active pregnancy on record        Passed - No positive pregnancy test in past 12 months        lov 12/17/19  Prescription approved per [facility] Protocol.    "

## 2020-03-10 ENCOUNTER — OFFICE VISIT (OUTPATIENT)
Dept: FAMILY MEDICINE | Facility: OTHER | Age: 65
End: 2020-03-10
Attending: FAMILY MEDICINE
Payer: COMMERCIAL

## 2020-03-10 ENCOUNTER — HOSPITAL ENCOUNTER (OUTPATIENT)
Dept: GENERAL RADIOLOGY | Facility: OTHER | Age: 65
End: 2020-03-10
Attending: FAMILY MEDICINE
Payer: COMMERCIAL

## 2020-03-10 DIAGNOSIS — M18.12 OSTEOARTHRITIS OF THUMB, LEFT: ICD-10-CM

## 2020-03-10 DIAGNOSIS — M18.12 OSTEOARTHRITIS OF THUMB, LEFT: Primary | ICD-10-CM

## 2020-03-10 DIAGNOSIS — E11.9 TYPE 2 DIABETES MELLITUS WITHOUT COMPLICATION, WITHOUT LONG-TERM CURRENT USE OF INSULIN (H): ICD-10-CM

## 2020-03-10 DIAGNOSIS — E03.9 ACQUIRED HYPOTHYROIDISM: ICD-10-CM

## 2020-03-10 DIAGNOSIS — E78.5 HYPERLIPIDEMIA, UNSPECIFIED HYPERLIPIDEMIA TYPE: ICD-10-CM

## 2020-03-10 DIAGNOSIS — I10 ESSENTIAL HYPERTENSION: ICD-10-CM

## 2020-03-10 DIAGNOSIS — Z00.00 MEDICARE ANNUAL WELLNESS VISIT, INITIAL: Primary | ICD-10-CM

## 2020-03-10 LAB
CHOLEST SERPL-MCNC: 154 MG/DL
HBA1C MFR BLD: 6.7 % (ref 4–6)
HDLC SERPL-MCNC: 37 MG/DL (ref 23–92)
LDLC SERPL CALC-MCNC: 77 MG/DL
NONHDLC SERPL-MCNC: 117 MG/DL
TRIGL SERPL-MCNC: 199 MG/DL

## 2020-03-10 PROCEDURE — 80061 LIPID PANEL: CPT | Mod: ZL | Performed by: FAMILY MEDICINE

## 2020-03-10 PROCEDURE — 36415 COLL VENOUS BLD VENIPUNCTURE: CPT | Mod: ZL | Performed by: FAMILY MEDICINE

## 2020-03-10 PROCEDURE — 83036 HEMOGLOBIN GLYCOSYLATED A1C: CPT | Mod: ZL | Performed by: FAMILY MEDICINE

## 2020-03-10 PROCEDURE — 73140 X-RAY EXAM OF FINGER(S): CPT | Mod: LT

## 2020-03-10 PROCEDURE — 99213 OFFICE O/P EST LOW 20 MIN: CPT | Mod: 25 | Performed by: FAMILY MEDICINE

## 2020-03-10 PROCEDURE — G0438 PPPS, INITIAL VISIT: HCPCS | Performed by: FAMILY MEDICINE

## 2020-03-10 PROCEDURE — G0463 HOSPITAL OUTPT CLINIC VISIT: HCPCS

## 2020-03-10 RX ORDER — SIMVASTATIN 20 MG
20 TABLET ORAL AT BEDTIME
Qty: 90 TABLET | Refills: 3 | Status: SHIPPED | OUTPATIENT
Start: 2020-03-10 | End: 2021-03-01

## 2020-03-10 ASSESSMENT — MIFFLIN-ST. JEOR: SCORE: 1686.67

## 2020-03-10 NOTE — PATIENT INSTRUCTIONS
Services Typically covered by Medicare Recommended Completed   Vaccines    Pneumonoccol    Influenza    Hepatitis B (if medium/high risk)     Once for patients after age 65    Yearly  Medium/high risk factors:    End Stage Kidney Disease    Hemophiliacs who received Factor XIII or IX concentrates    Clients of institutions for developmentally disabled    Persons who live in same house as a Hepatitis B carrier    Homosexual men    Illicit injectable drug users    Health care workers     Mammogram Covered: One-time screen between age 35-39, annually for age 40+     Pap and Pelvic Exam Covered: Annually if  high risk,  or childbearing age with abnormal Pap in last 3 years.  Q24 months for all other women     Prostate Cancer Screening    Digital rectal exam    PSA Covered: Annually for all men > age 50     Corolrectal Cancer Screening Screening colonoscopy every 10 years, more often for high risk patients     Diabetes Self-Management Training Requires referral by treating physician for patient with diabetes     Diabetes Screening    Fasting blood sugar or glucose tolerance test   Once yearly, twice yearly if prediabetic     Cardiovascular Screening Blood Tests    Total Cholesterol    HDL    Triglycerides Every 5 years     Medical Nutrition Therapy for Diabetes or Renal Disease Requires referral by treating physician for patient with diabetes or kidney disease     Glaucoma Screening Annually for patients with one of the following risk factors:    Diabetes Mellitus    Family history of Glaucoma    -American age 50 and over    -American age 65 and over     Bone Mass Measurement Every 24 months if one of the following risk factors:    Estrogen deficiency    Vertebral abnormalities on x-ray indicative of Osteoporosis, Osteopenia, or Vertebral fracture    Receiving/expected to receive the equivalent of at least 5 mg of Prednisone per day for > 3 months    Hyperparathyroidism    Patient being monitored for  response to Osteoporosis Therapy     One-time AAA screen  Must be ordered as part of Medicare IPPE   Any patient with a family history of AAA    Males Age 65-75, with history of smoking at least 100 cigarettes in lifetime     Smoking Cessation Counseling Beneficiaries who use tobacco are eligible to receive 2 cessation attempts per year; each attempt includes maximum of 4 sessions     HIV Screening Annually for beneficiaries at increased risk:       Increased risk for HIV infection is defined in the  National Coverage Determinations (NCD) Manual,  Publication 100-03 Sections 190.14 (diagnostic) and 210.7 (screening). See http://www.cms.gov/manuals/downloads/vzt901u3_Fkpn8.pdf and http://www.cms.gov/manuals/downloads/hgn194z7_Reqb9.pdf on the Internet.  Three times per pregnancy for beneficiaries who are pregnant.     Future Annual Wellness Visit Annually, for all beneficiaries.

## 2020-03-10 NOTE — PROGRESS NOTES
"SUBJECTIVE:   Neelam Miller is a 64 year old female who presents for Preventive Visit.      Are you in the first 12 months of your Medicare Part B coverage?  Yes,  Visual Acuity:  Right Eye: nl   Left Eye: nl  Both Eyes: nl    Physical Health:    In general, how would you rate your overall physical health? good    Outside of work, how many days during the week do you exercise? 4-5 days/week    Outside of work, approximately how many minutes a day do you exercise?45-60 minutes    If you drink alcohol do you typically have >3 drinks per day or >7 drinks per week? No    Do you usually eat at least 4 servings of fruit and vegetables a day, include whole grains & fiber and avoid regularly eating high fat or \"junk\" foods? Yes    Do you have any problems taking medications regularly?  Yes- due to lap band    Do you have any side effects from medications? none    Needs assistance for the following daily activities: no assistance needed    Which of the following safety concerns are present in your home?  none identified     Hearing impairment: No    In the past 6 months, have you been bothered by leaking of urine? no    Mental Health:    In general, how would you rate your overall mental or emotional health? good  PHQ-2 Score: 0    Do you feel safe in your environment? Yes    Have you ever done Advance Care Planning? (For example, a Health Directive, POLST, or a discussion with a medical provider or your loved ones about your wishes): Yes, advance care planning is on file.    Additional concerns to address?  YES    Fall risk:  Fallen 2 or more times in the past year?: No  Any fall with injury in the past year?: No    Cognitive Screenin) Repeat 3 items (Leader, Season, Table)    2) Clock draw: NORMAL  3) 3 item recall: Recalls 2 objects   Results: 3 items recalled: COGNITIVE IMPAIRMENT LESS LIKELY    Mini-CogTM Copyright S Sharri. Licensed by the author for use in [facility]; reprinted with permission " ([email or web address]). All rights reserved.      Do you have sleep apnea, excessive snoring or daytime drowsiness?: she has a new sleep machine within the past year        Diabetes Follow-up      How often are you checking your blood sugar? Not at all    What concerns do you have today about your diabetes? None     Do you have any of these symptoms? (Select all that apply)  No numbness or tingling in feet.  No redness, sores or blisters on feet.  No complaints of excessive thirst.  No reports of blurry vision.  No significant changes to weight.        Hyperlipidemia Follow-Up      Are you regularly taking any medication or supplement to lower your cholesterol?   Yes- statin    Are you having muscle aches or other side effects that you think could be caused by your cholesterol lowering medication?  No    Hypertension Follow-up      Do you check your blood pressure regularly outside of the clinic? No     Are you following a low salt diet? Most of the time    Are your blood pressures ever more than 140 on the top number (systolic) OR more   than 90 on the bottom number (diastolic), for example 140/90? No    BP Readings from Last 2 Encounters:   03/10/20 122/84   12/17/19 118/86     Hemoglobin A1C (%)   Date Value   03/10/2020 6.7 (H)   12/17/2019 6.7 (H)     LDL Cholesterol Calculated (mg/dL)   Date Value   03/10/2020 77   03/19/2019 81       Hypothyroidism Follow-up      Since last visit, patient describes the following symptoms: Weight stable, no hair loss, no skin changes, no constipation, no loose stools        Reviewed and updated as needed this visit by clinical staff  Tobacco  Allergies  Meds  Soc Hx        Reviewed and updated as needed this visit by Provider  Tobacco        Social History     Tobacco Use     Smoking status: Never Smoker     Smokeless tobacco: Never Used   Substance Use Topics     Alcohol use: Yes     Alcohol/week: 0.0 standard drinks                           Current providers sharing in care for  "this patient include:   Patient Care Team:  Flori Carney MD as PCP - General  Flori Carney MD as Assigned PCP    The following health maintenance items are reviewed in Epic and correct as of today:  Health Maintenance   Topic Date Due     MICROALBUMIN  03/19/2020     ZOSTER IMMUNIZATION (3 of 3) 05/05/2020     EYE EXAM  08/22/2020     A1C  09/10/2020     ASTHMA CONTROL TEST  09/10/2020     BMP  09/11/2020     DIABETIC FOOT EXAM  12/17/2020     PNEUMOCOCCAL IMMUNIZATION 65+ LOW/MEDIUM RISK (2 of 2 - PPSV23) 12/17/2020     MEDICARE ANNUAL WELLNESS VISIT  03/10/2021     LIPID  03/10/2021     ASTHMA ACTION PLAN  03/10/2021     FALL RISK ASSESSMENT  03/10/2021     MAMMO SCREENING  06/11/2021     DTAP/TDAP/TD IMMUNIZATION (2 - Td) 09/21/2022     ADVANCE CARE PLANNING  03/10/2025     COLORECTAL CANCER SCREENING  03/18/2025     DEXA  Completed     HEPATITIS C SCREENING  Completed     PHQ-2  Completed     INFLUENZA VACCINE  Completed     HIV SCREENING  Addressed     IPV IMMUNIZATION  Aged Out     MENINGITIS IMMUNIZATION  Aged Out         ROS:  Left thumb with pain at the base of it  Eye exam up to date  Pneumonia last year   Exercise - 2-3 times a week    OBJECTIVE:   /84 (BP Location: Right arm, Patient Position: Sitting, Cuff Size: Adult Large)   Pulse 64   Temp 95.5  F (35.3  C)   Resp 20   Ht 1.727 m (5' 8\")   Wt 109.3 kg (241 lb)   SpO2 98%   BMI 36.64 kg/m   Estimated body mass index is 36.64 kg/m  as calculated from the following:    Height as of this encounter: 1.727 m (5' 8\").    Weight as of this encounter: 109.3 kg (241 lb).  EXAM:   GENERAL: healthy, alert and no distress  NECK: no adenopathy, no asymmetry, masses, or scars and thyroid normal to palpation  RESP: lungs clear to auscultation - no rales, rhonchi or wheezes  CV: regular rate and rhythm, normal S1 S2, no S3 or S4, no murmur, click or rub, no peripheral edema and peripheral pulses strong  ABDOMEN: soft, " "nontender, no hepatosplenomegaly, no masses and bowel sounds normal  MS: tenderness base of her thumb    Diagnostic Test Results:  Labs reviewed in Epic    ASSESSMENT / PLAN:       ICD-10-CM    1. Medicare annual wellness visit, initial  Z00.00    2. Hyperlipidemia, unspecified hyperlipidemia type  E78.5 simvastatin (ZOCOR) 20 MG tablet     NATURE-THROID 65 MG tablet     Lipid Panel     Lipid Panel   3. Type 2 diabetes mellitus without complication, without long-term current use of insulin (H)  E11.9 Hemoglobin A1c     Hemoglobin A1c   4. Acquired hypothyroidism  E03.9    5. Essential hypertension  I10    6. Osteoarthritis of thumb, left  M18.12 XR Finger Left G/E 2 Views       COUNSELING:  Reviewed preventive health counseling, as reflected in patient instructions       Regular exercise       Healthy diet/nutrition       Vision screening       Hearing screening       Dental care       Fall risk prevention       Immunizations    Zoster         Estimated body mass index is 36.64 kg/m  as calculated from the following:    Height as of this encounter: 1.727 m (5' 8\").    Weight as of this encounter: 109.3 kg (241 lb).    Weight management plan: she has had lapband surgery; encourage decerased calorie intake/increased veg intake     reports that she has never smoked. She has never used smokeless tobacco.      Appropriate preventive services were discussed with this patient, including applicable screening as appropriate for cardiovascular disease, diabetes, osteopenia/osteoporosis, and glaucoma.  As appropriate for age/gender, discussed screening for colorectal cancer, prostate cancer, breast cancer, and cervical cancer. Checklist reviewing preventive services available has been given to the patient.    Reviewed patients plan of care and provided an AVS. The Intermediate Care Plan ( asthma action plan, low back pain action plan, and migraine action plan) for Neelam meets the Care Plan requirement. This Care Plan has been " established and reviewed with the Patient.    For the patient's thumb symptoms, imaging was obtained today.  This is consistent with degenerative changes of the left first MCP joint.  I have recommended orthopedic consultation for consideration of injection, consideration of bracing/Occupational Therapy.    Labs ordered today related to diabetes, thyroid disease, hypertension, hyperlipidemia:  Results for orders placed or performed during the hospital encounter of 03/10/20   XR Finger Left G/E 2 Views     Status: None    Narrative    PROCEDURE:  XR FINGER LT G/E 2 VW    HISTORY: Osteoarthritis of thumb, left.    COMPARISON:  None.    TECHNIQUE:  3 views left thumb.    FINDINGS:  No fracture or dislocation is identified. Mild joint space  narrowing and osteophytosis are present at the first carpometacarpal  and metacarpophalangeal joints. Mild degeneration is also present at  the STT joint. No foreign body is seen.       Impression    IMPRESSION: Mild osteoarthritis of the left thumb.      MIL FUENTES MD   Results for orders placed or performed in visit on 03/10/20   Hemoglobin A1c     Status: Abnormal   Result Value Ref Range    Hemoglobin A1C 6.7 (H) 4.0 - 6.0 %   Lipid Panel     Status: Abnormal   Result Value Ref Range    Cholesterol 154 <200 mg/dL    Triglycerides 199 (H) <150 mg/dL    HDL Cholesterol 37 23 - 92 mg/dL    LDL Cholesterol Calculated 77 <100 mg/dL    Non HDL Cholesterol 117 <130 mg/dL     These are reviewed, stable.  No medication changes made today.    Counseling Resources:  ATP IV Guidelines  Pooled Cohorts Equation Calculator  Breast Cancer Risk Calculator  FRAX Risk Assessment  ICSI Preventive Guidelines  Dietary Guidelines for Americans, 2010  USDA's MyPlate  ASA Prophylaxis  Lung CA Screening    KANDI MASTERS MD  [facility] AND [facility]

## 2020-03-10 NOTE — NURSING NOTE
Chief Complaint   Patient presents with     Medicare Visit     Welcome         Medication Reconciliation: complete    Michaela Majano, LPN

## 2020-03-11 ASSESSMENT — ASTHMA QUESTIONNAIRES: ACT_TOTALSCORE: 25

## 2020-03-13 DIAGNOSIS — E03.9 ACQUIRED HYPOTHYROIDISM: Primary | ICD-10-CM

## 2020-03-14 VITALS
SYSTOLIC BLOOD PRESSURE: 122 MMHG | TEMPERATURE: 95.5 F | WEIGHT: 241 LBS | DIASTOLIC BLOOD PRESSURE: 84 MMHG | RESPIRATION RATE: 20 BRPM | BODY MASS INDEX: 36.53 KG/M2 | HEIGHT: 68 IN | HEART RATE: 64 BPM | OXYGEN SATURATION: 98 %

## 2020-03-17 RX ORDER — THYROID,PORK 32.5 MG
TABLET ORAL
Qty: 90 TABLET | Refills: 1 | Status: SHIPPED | OUTPATIENT
Start: 2020-03-17 | End: 2020-06-10

## 2020-03-17 NOTE — TELEPHONE ENCOUNTER
LOV 3/10/20. Prescription approved per [facility] Refill Protocol.  Drea Payne RN...........3/17/2020 11:39 AM

## 2020-05-15 ENCOUNTER — OFFICE VISIT (OUTPATIENT)
Dept: ORTHOPEDICS | Facility: OTHER | Age: 65
End: 2020-05-15
Attending: FAMILY MEDICINE
Payer: COMMERCIAL

## 2020-05-15 VITALS
HEART RATE: 64 BPM | DIASTOLIC BLOOD PRESSURE: 82 MMHG | SYSTOLIC BLOOD PRESSURE: 142 MMHG | BODY MASS INDEX: 36.19 KG/M2 | WEIGHT: 238 LBS

## 2020-05-15 DIAGNOSIS — M18.12 OSTEOARTHRITIS OF THUMB, LEFT: ICD-10-CM

## 2020-05-15 PROCEDURE — 99202 OFFICE O/P NEW SF 15 MIN: CPT | Mod: 25 | Performed by: SPECIALIST

## 2020-05-15 PROCEDURE — G0463 HOSPITAL OUTPT CLINIC VISIT: HCPCS

## 2020-05-15 PROCEDURE — 20605 DRAIN/INJ JOINT/BURSA W/O US: CPT | Mod: LT | Performed by: SPECIALIST

## 2020-05-15 PROCEDURE — 25000125 ZZHC RX 250: Performed by: SPECIALIST

## 2020-05-15 PROCEDURE — 25000128 H RX IP 250 OP 636: Performed by: SPECIALIST

## 2020-05-15 PROCEDURE — G0463 HOSPITAL OUTPT CLINIC VISIT: HCPCS | Mod: 25

## 2020-05-15 RX ORDER — METHYLPREDNISOLONE ACETATE 40 MG/ML
40 INJECTION, SUSPENSION INTRA-ARTICULAR; INTRALESIONAL; INTRAMUSCULAR; SOFT TISSUE ONCE
Status: COMPLETED | OUTPATIENT
Start: 2020-05-15 | End: 2020-05-15

## 2020-05-15 RX ORDER — BUPIVACAINE HYDROCHLORIDE 5 MG/ML
1 INJECTION, SOLUTION EPIDURAL; INTRACAUDAL ONCE
Status: COMPLETED | OUTPATIENT
Start: 2020-05-15 | End: 2020-05-15

## 2020-05-15 RX ADMIN — BUPIVACAINE HYDROCHLORIDE 5 MG: 5 INJECTION, SOLUTION EPIDURAL; INTRACAUDAL at 10:29

## 2020-05-15 RX ADMIN — METHYLPREDNISOLONE ACETATE 40 MG: 40 INJECTION, SUSPENSION INTRA-ARTICULAR; INTRALESIONAL; INTRAMUSCULAR; SOFT TISSUE at 10:28

## 2020-05-15 NOTE — PROGRESS NOTES
Visit Date:   05/15/2020      HISTORY OF PRESENT ILLNESS:  Reed is a 64-year-old left-hand dominant retired  I am seeing today for evaluation of her left thumb.  She has been problems with her left thumb for several months.  This is estimated at least 6 months.  She notices pain, snapping of the digit as well as discomfort over the basilar joint.  She has had no triggering by her report.  Numbness and tingling is not an issue as well.      PAST MEDICAL HISTORY, PAST SURGICAL HISTORY, MEDICATIONS, AND ALLERGIES:  Reviewed.      PHYSICAL EXAMINATION:     GENERAL:  A 64-year-old female.  She is alert and oriented x3 and appropriate.  Gait and station are appropriate.  She is well groomed and well kempt.     UPPER EXTREMITIES:  Examination of both upper extremities reveals full and symmetric range of motion of elbows, wrists.  Examination of the left thumb shows no evidence of trophic changes, adenopathy or focal weakness.  There is no evidence of tenderness of the first dorsal compartment or intersection region.  There is no tenderness with palpation of the A1 pulley, no evidence of triggering.  She does have tenderness with palpation over the basilar joint.  Palmar abduction is mildly limited.      X-rays are reviewed, dated 03/10/2020.  These reveal mild basilar joint arthrosis.  Otherwise uniform mineralization, well-maintained joint spaces.      IMPRESSION:  Left thumb basilar joint symptoms.        We discussed proceeding with an injection.  A sterile prep was performed and left basilar joint was injected with 40 mg of Depo-Medrol and 1 mL of 0.5% Marcaine with epinephrine.  There were no complications.  After a period of observation, she noted significant improvement.  We discussed repeat injections as symptoms warrant.         CONCHIS SOLANO MD             D: 05/15/2020   T: 05/15/2020   MT:       Name:     REED QUIÑONEZ   MRN:      [id number]        Account:      [id number]   :       1955           Visit Date:   05/15/2020      Document: [id number]

## 2020-05-15 NOTE — PROGRESS NOTES
Patient is here for consult on her left thumb.   Socorro Zhang LPN .....................5/15/2020 9:58 AM

## 2020-06-10 DIAGNOSIS — E03.9 ACQUIRED HYPOTHYROIDISM: ICD-10-CM

## 2020-06-12 ENCOUNTER — HOSPITAL ENCOUNTER (OUTPATIENT)
Dept: MAMMOGRAPHY | Facility: OTHER | Age: 65
Discharge: HOME | End: 2020-06-12
Attending: FAMILY MEDICINE | Admitting: FAMILY MEDICINE
Payer: COMMERCIAL

## 2020-06-12 DIAGNOSIS — Z12.31 VISIT FOR SCREENING MAMMOGRAM: ICD-10-CM

## 2020-06-12 PROCEDURE — 77063 BREAST TOMOSYNTHESIS BI: CPT

## 2020-06-12 RX ORDER — THYROID,PORK 32.5 MG
TABLET ORAL
Qty: 90 TABLET | Refills: 1 | Status: SHIPPED | OUTPATIENT
Start: 2020-06-12 | End: 2020-06-22

## 2020-06-12 NOTE — TELEPHONE ENCOUNTER
"Requested Prescriptions   Pending Prescriptions Disp Refills     NATURE-THROID 32.5 MG tablet 90 tablet 3     Sig: TAKE 1.5 TABLETS (48.75 MG) BY MOUTH EVERY DAY. TAKE WITH 2- 65 MG TABLETS TO EQUAL 178.75 MG       Thyroid Protocol Failed - 6/10/2020  9:44 AM        Failed - Normal TSH on file in past 12 months     No lab results found.           Passed - Patient is 12 years or older        Passed - Recent (12 mo) or future (30 days) visit within the authorizing provider's specialty     Patient has had an office visit with the authorizing provider or a provider within the authorizing providers department within the previous 12 mos or has a future within next 30 days. See \"Patient Info\" tab in inbasket, or \"Choose Columns\" in Meds & Orders section of the refill encounter.              Passed - Medication is active on med list        Passed - No active pregnancy on record     If patient is pregnant or has had a positive pregnancy test, please check TSH.          Passed - No positive pregnancy test in past 12 months     If patient is pregnant or has had a positive pregnancy test, please check TSH.             12/17/2019 TSH 0.65-Your thyroid level is normal, we will keep you on your same dose of replacement at this time.       KANDI MASTERS MD   Last Office Visit: 03/10/2020  Future Office visit:         Prescription refilled per RN Medication RefillPolicy.................... Clementina Damon RN ....................  6/12/2020   9:19 AM        "

## 2020-06-22 ENCOUNTER — MYC MEDICAL ADVICE (OUTPATIENT)
Dept: FAMILY MEDICINE | Facility: OTHER | Age: 65
End: 2020-06-22

## 2020-06-22 DIAGNOSIS — E03.9 ACQUIRED HYPOTHYROIDISM: ICD-10-CM

## 2020-06-22 RX ORDER — THYROID,PORK 32.5 MG
TABLET ORAL
Qty: 135 TABLET | Refills: 3 | Status: SHIPPED | OUTPATIENT
Start: 2020-06-22 | End: 2020-09-30

## 2020-09-08 ENCOUNTER — MYC MEDICAL ADVICE (OUTPATIENT)
Dept: FAMILY MEDICINE | Facility: OTHER | Age: 65
End: 2020-09-08

## 2020-09-08 DIAGNOSIS — E03.9 ACQUIRED HYPOTHYROIDISM: Primary | ICD-10-CM

## 2020-09-08 NOTE — TELEPHONE ENCOUNTER
[facility]  [address] [place] [address]  Phone [phone], Fax [phone]

## 2020-09-16 ENCOUNTER — ALLIED HEALTH/NURSE VISIT (OUTPATIENT)
Dept: FAMILY MEDICINE | Facility: OTHER | Age: 65
End: 2020-09-16
Attending: FAMILY MEDICINE
Payer: COMMERCIAL

## 2020-09-16 DIAGNOSIS — Z23 NEED FOR PROPHYLACTIC VACCINATION AND INOCULATION AGAINST INFLUENZA: Primary | ICD-10-CM

## 2020-09-16 PROCEDURE — G0008 ADMIN INFLUENZA VIRUS VAC: HCPCS

## 2020-09-16 PROCEDURE — 90471 IMMUNIZATION ADMIN: CPT

## 2020-09-16 PROCEDURE — 90662 IIV NO PRSV INCREASED AG IM: CPT

## 2020-09-16 NOTE — NURSING NOTE
Patient presents to the clinic today for a flu shot.   Med rec complete.  Chelsey Gutierrez LPN.................. 9/16/2020 3:10 PM

## 2020-09-18 DIAGNOSIS — B37.9 YEAST INFECTION: ICD-10-CM

## 2020-09-18 RX ORDER — FLUCONAZOLE 100 MG/1
100 TABLET ORAL PRN
Qty: 90 TABLET | Refills: 2 | Status: SHIPPED | OUTPATIENT
Start: 2020-09-18 | End: 2021-11-03

## 2020-09-30 ENCOUNTER — VIRTUAL VISIT (OUTPATIENT)
Dept: FAMILY MEDICINE | Facility: OTHER | Age: 65
End: 2020-09-30
Attending: FAMILY MEDICINE
Payer: COMMERCIAL

## 2020-09-30 DIAGNOSIS — M18.12 OSTEOARTHRITIS OF THUMB, LEFT: ICD-10-CM

## 2020-09-30 DIAGNOSIS — E11.9 TYPE 2 DIABETES MELLITUS WITHOUT COMPLICATION, WITHOUT LONG-TERM CURRENT USE OF INSULIN (H): ICD-10-CM

## 2020-09-30 DIAGNOSIS — E03.9 ACQUIRED HYPOTHYROIDISM: Primary | ICD-10-CM

## 2020-09-30 PROCEDURE — 99214 OFFICE O/P EST MOD 30 MIN: CPT | Mod: 95 | Performed by: FAMILY MEDICINE

## 2020-09-30 ASSESSMENT — PAIN SCALES - GENERAL: PAINLEVEL: NO PAIN (0)

## 2020-09-30 NOTE — PROGRESS NOTES
"Neelam Miller is a 65 year old female who is being evaluated via a billable video visit.      The patient has been notified of following:     \"This video visit will be conducted via a call between you and your physician/provider. We have found that certain health care needs can be provided without the need for an in-person physical exam.  This service lets us provide the care you need with a video conversation.  If a prescription is necessary we can send it directly to your pharmacy.  If lab work is needed we can place an order for that and you can then stop by our lab to have the test done at a later time.    Video visits are billed at different rates depending on your insurance coverage.  Please reach out to your insurance provider with any questions.    If during the course of the call the physician/provider feels a video visit is not appropriate, you will not be charged for this service.\"    Patient has given verbal consent for Video visit? Yes  How would you like to obtain your AVS? Yes  If you are dropped from the video visit, the video invite should be resent to: Mychart  Will anyone else be joining your video visit? No    Subjective     Neelam Miller is a 65 year old female who presents today via video visit for the following health issues:    HPI Neelam Miller is a 65 year old female assessed for follow up of thyroid disease.  She recently had a change in formulation due to a recall due to substandard dosing on her naturthroid.  She changed formulations.  She will be due for follow up labs.  She wishes to have a compounding pharmacy determine her medication dose.      Type 2 diabetes:  Treatment has included lapband surgery.  Not on medications.    Eye exam is scheduled next week.  Does not do home glucose monitoring.   Lab Results   Component Value Date    A1C 6.7 03/10/2020    A1C 6.7 12/17/2019    A1C 6.3 09/11/2019    A1C 6.4 03/19/2019    A1C 6.2 03/05/2018        Left hand caluses and warts on her " writing fingers.  She clipped some of it off, tried some over the counter medication for these.      She is left handed and is developing more osteoarthritis symptoms in her left thumb.  She has already scheduled an upcoming orthopedics evaluation and needs referral placed.         Video Start Time: 12:10 PM        Review of Systems  History of recurrent yeast infections.         Objective    Vitals - Patient Reported  Pain Score: No Pain (0)      Vitals:  No vitals were obtained today due to virtual visit.    Physical Exam     GENERAL: Healthy, alert and no distress  EYES: Eyes grossly normal to inspection.  No discharge or erythema, or obvious scleral/conjunctival abnormalities.  RESP: No audible wheeze, cough, or visible cyanosis.  No visible retractions or increased work of breathing.    SKIN: Visible skin clear. No significant rash, abnormal pigmentation or lesions.  NEURO: Cranial nerves grossly intact.  Mentation and speech appropriate for age.  PSYCH: Mentation appears normal, affect normal/bright, judgement and insight intact, normal speech and appearance well-groomed.  MUSCULOSKELETAL:  No obvious atrophy of left hand muscles.              ICD-10-CM    1. Acquired hypothyroidism  E03.9 TSH     T4, Free     T3, Free   2. Osteoarthritis of thumb, left  M18.12 Orthopedic & Spine  Referral   3. Type 2 diabetes mellitus without complication, without long-term current use of insulin (H)  E11.9 Hemoglobin A1c     Albumin Random Urine Quantitative with Creat Ratio     Comprehensive metabolic panel     1.  Labs placed for thyroid and diabetes monitoring.  Discussed role of wt management with diabetes treatment and staying off of medication/decreasing complication risks.    2.  Orthopedics consult is placed.  3.  Flu vaccine is up to date.    4.  Pneumococcal vaccine is due - ok to make nurse visit or pharmacy visit to receive.    Follow up in 3 months - in particular if she starts on a new thyroid  replacement formulation.        Video-Visit Details    Type of service:  Video Visit    Video End Time:12:26 PM    Originating Location (pt. Location): Home    Distant Location (provider location):  [facility]     Platform used for Video Visit: EraWell

## 2020-10-02 ENCOUNTER — MYC MEDICAL ADVICE (OUTPATIENT)
Dept: FAMILY MEDICINE | Facility: OTHER | Age: 65
End: 2020-10-02

## 2020-10-02 DIAGNOSIS — E11.9 TYPE 2 DIABETES MELLITUS WITHOUT COMPLICATION, WITHOUT LONG-TERM CURRENT USE OF INSULIN (H): ICD-10-CM

## 2020-10-02 DIAGNOSIS — E03.9 ACQUIRED HYPOTHYROIDISM: ICD-10-CM

## 2020-10-02 LAB
ALBUMIN SERPL-MCNC: 4.3 G/DL (ref 3.5–5.7)
ALP SERPL-CCNC: 57 U/L (ref 34–104)
ALT SERPL W P-5'-P-CCNC: 32 U/L (ref 7–52)
ANION GAP SERPL CALCULATED.3IONS-SCNC: 7 MMOL/L (ref 3–14)
AST SERPL W P-5'-P-CCNC: 25 U/L (ref 13–39)
BILIRUB SERPL-MCNC: 0.6 MG/DL (ref 0.3–1)
BUN SERPL-MCNC: 16 MG/DL (ref 7–25)
CALCIUM SERPL-MCNC: 9.7 MG/DL (ref 8.6–10.3)
CHLORIDE SERPL-SCNC: 102 MMOL/L (ref 98–107)
CO2 SERPL-SCNC: 31 MMOL/L (ref 21–31)
CREAT SERPL-MCNC: 0.82 MG/DL (ref 0.6–1.2)
CREAT UR-MCNC: 133 MG/DL
GFR SERPL CREATININE-BSD FRML MDRD: 70 ML/MIN/{1.73_M2}
GLUCOSE SERPL-MCNC: 138 MG/DL (ref 70–105)
HBA1C MFR BLD: 6.8 % (ref 4–6)
MICROALBUMIN UR-MCNC: 9 MG/L
MICROALBUMIN/CREAT UR: 6.73 MG/G CR (ref 0–25)
POTASSIUM SERPL-SCNC: 4.1 MMOL/L (ref 3.5–5.1)
PROT SERPL-MCNC: 7.6 G/DL (ref 6.4–8.9)
SODIUM SERPL-SCNC: 140 MMOL/L (ref 134–144)
T3FREE SERPL-MCNC: 2.8 PG/ML (ref 2.5–3.9)
T4 FREE SERPL-MCNC: 0.65 NG/DL (ref 0.6–1.6)
TSH SERPL DL<=0.05 MIU/L-ACNC: 0.89 IU/ML (ref 0.34–5.6)

## 2020-10-02 PROCEDURE — 82043 UR ALBUMIN QUANTITATIVE: CPT | Mod: ZL | Performed by: FAMILY MEDICINE

## 2020-10-02 PROCEDURE — 80053 COMPREHEN METABOLIC PANEL: CPT | Mod: ZL | Performed by: FAMILY MEDICINE

## 2020-10-02 PROCEDURE — 84443 ASSAY THYROID STIM HORMONE: CPT | Mod: ZL | Performed by: FAMILY MEDICINE

## 2020-10-02 PROCEDURE — 83036 HEMOGLOBIN GLYCOSYLATED A1C: CPT | Mod: ZL | Performed by: FAMILY MEDICINE

## 2020-10-02 PROCEDURE — 84439 ASSAY OF FREE THYROXINE: CPT | Mod: ZL | Performed by: FAMILY MEDICINE

## 2020-10-02 PROCEDURE — 84481 FREE ASSAY (FT-3): CPT | Mod: ZL | Performed by: FAMILY MEDICINE

## 2020-10-02 PROCEDURE — 36415 COLL VENOUS BLD VENIPUNCTURE: CPT | Mod: ZL | Performed by: FAMILY MEDICINE

## 2020-10-04 SDOH — SOCIAL STABILITY: SOCIAL INSECURITY: WITHIN THE LAST YEAR, HAVE YOU BEEN AFRAID OF YOUR PARTNER OR EX-PARTNER?: NOT ASKED

## 2020-10-04 SDOH — ECONOMIC STABILITY: FOOD INSECURITY: WITHIN THE PAST 12 MONTHS, THE FOOD YOU BOUGHT JUST DIDN'T LAST AND YOU DIDN'T HAVE MONEY TO GET MORE.: NEVER TRUE

## 2020-10-04 SDOH — SOCIAL STABILITY: SOCIAL NETWORK: HOW OFTEN DO YOU GET TOGETHER WITH FRIENDS OR RELATIVES?: NOT ASKED

## 2020-10-04 SDOH — SOCIAL STABILITY: SOCIAL NETWORK: HOW OFTEN DO YOU ATTEND MEETINGS OF THE CLUBS OR ORGANIZATIONS YOU BELONG TO?: MORE THAN 4 TIMES PER YEAR

## 2020-10-04 SDOH — ECONOMIC STABILITY: TRANSPORTATION INSECURITY: IN THE PAST 12 MONTHS, HAS LACK OF TRANSPORTATION KEPT YOU FROM MEDICAL APPOINTMENTS OR FROM GETTING MEDICATIONS?: NO

## 2020-10-04 SDOH — SOCIAL STABILITY: SOCIAL INSECURITY
WITHIN THE LAST YEAR, HAVE YOU BEEN HUMILIATED OR EMOTIONALLY ABUSED IN OTHER WAYS BY YOUR PARTNER OR EX-PARTNER?: NOT ASKED

## 2020-10-04 SDOH — SOCIAL STABILITY: SOCIAL NETWORK: IN A TYPICAL WEEK, HOW MANY TIMES DO YOU TALK ON THE PHONE WITH FAMILY, FRIENDS, OR NEIGHBORS?: NOT ASKED

## 2020-10-04 SDOH — SOCIAL STABILITY: SOCIAL NETWORK
DO YOU BELONG TO ANY CLUBS OR ORGANIZATIONS SUCH AS CHURCH GROUPS, UNIONS, FRATERNAL OR ATHLETIC GROUPS, OR SCHOOL GROUPS?: YES

## 2020-10-04 SDOH — ECONOMIC STABILITY: FOOD INSECURITY: HOW HARD IS IT FOR YOU TO PAY FOR THE VERY BASICS LIKE FOOD, HOUSING, MEDICAL CARE, AND HEATING?: NOT HARD AT ALL

## 2020-10-04 SDOH — SOCIAL STABILITY: SOCIAL INSECURITY: ARE YOU MARRIED, WIDOWED, DIVORCED, SEPARATED, NEVER MARRIED, OR LIVING WITH A PARTNER?: LIVING WITH PARTNER

## 2020-10-04 SDOH — HEALTH STABILITY: MENTAL HEALTH
DO YOU FEEL STRESS - TENSE, RESTLESS, NERVOUS, OR ANXIOUS, OR UNABLE TO SLEEP AT NIGHT BECAUSE YOUR MIND IS TROUBLED ALL THE TIME - THESE DAYS?: ONLY A LITTLE

## 2020-10-04 SDOH — HEALTH STABILITY: PHYSICAL HEALTH: ON AVERAGE, HOW MANY MINUTES DO YOU ENGAGE IN EXERCISE AT THIS LEVEL?: NOT ASKED

## 2020-10-04 SDOH — SOCIAL STABILITY: SOCIAL INSECURITY
WITHIN THE LAST YEAR, HAVE YOU BEEN RAPED OR FORCED TO HAVE ANY KIND OF SEXUAL ACTIVITY BY YOUR PARTNER OR EX-PARTNER?: NOT ASKED

## 2020-10-04 SDOH — SOCIAL STABILITY: SOCIAL NETWORK: HOW OFTEN DO YOU ATTEND CHURCH OR RELIGIOUS SERVICES?: MORE THAN 4 TIMES PER YEAR

## 2020-10-04 SDOH — SOCIAL STABILITY: SOCIAL INSECURITY
WITHIN THE LAST YEAR, HAVE YOU BEEN KICKED, HIT, SLAPPED, OR OTHERWISE PHYSICALLY HURT BY YOUR PARTNER OR EX-PARTNER?: NOT ASKED

## 2020-10-04 SDOH — ECONOMIC STABILITY: FOOD INSECURITY: WITHIN THE PAST 12 MONTHS, YOU WORRIED THAT YOUR FOOD WOULD RUN OUT BEFORE YOU GOT THE MONEY TO BUY MORE.: NEVER TRUE

## 2020-10-04 SDOH — HEALTH STABILITY: PHYSICAL HEALTH: ON AVERAGE, HOW MANY DAYS PER WEEK DO YOU ENGAGE IN MODERATE TO STRENUOUS EXERCISE (LIKE A BRISK WALK)?: NOT ASKED

## 2020-10-04 ASSESSMENT — ACTIVITIES OF DAILY LIVING (ADL): LACK_OF_TRANSPORTATION: NO

## 2020-10-06 ENCOUNTER — TRANSFERRED RECORDS (OUTPATIENT)
Dept: HEALTH INFORMATION MANAGEMENT | Facility: OTHER | Age: 65
End: 2020-10-06

## 2020-10-06 LAB — RETINOPATHY: NEGATIVE

## 2020-10-07 ENCOUNTER — MYC MEDICAL ADVICE (OUTPATIENT)
Dept: FAMILY MEDICINE | Facility: OTHER | Age: 65
End: 2020-10-07

## 2020-10-07 DIAGNOSIS — E03.9 ACQUIRED HYPOTHYROIDISM: ICD-10-CM

## 2020-10-08 DIAGNOSIS — E03.9 ACQUIRED HYPOTHYROIDISM: ICD-10-CM

## 2020-10-08 RX ORDER — THYROID 180 MG
TABLET ORAL
Qty: 30 TABLET | Refills: 0 | OUTPATIENT
Start: 2020-10-08

## 2020-10-08 NOTE — TELEPHONE ENCOUNTER
thyroid (ARMOUR) 180 MG tablet 90 tablet 3 10/7/2020  No   Sig - Route: Take 1 tablet (180 mg) by mouth daily      To [facility]    Minal Tay RN on 10/8/2020 at 8:30 AM

## 2020-10-12 ENCOUNTER — TRANSFERRED RECORDS (OUTPATIENT)
Dept: HEALTH INFORMATION MANAGEMENT | Facility: OTHER | Age: 65
End: 2020-10-12

## 2020-12-15 ENCOUNTER — OFFICE VISIT (OUTPATIENT)
Dept: FAMILY MEDICINE | Facility: OTHER | Age: 65
End: 2020-12-15
Attending: FAMILY MEDICINE
Payer: COMMERCIAL

## 2020-12-15 VITALS
SYSTOLIC BLOOD PRESSURE: 136 MMHG | OXYGEN SATURATION: 97 % | WEIGHT: 242 LBS | DIASTOLIC BLOOD PRESSURE: 80 MMHG | BODY MASS INDEX: 36.8 KG/M2 | HEART RATE: 70 BPM | TEMPERATURE: 97.2 F | RESPIRATION RATE: 16 BRPM

## 2020-12-15 DIAGNOSIS — E11.9 TYPE 2 DIABETES MELLITUS WITHOUT COMPLICATION, WITHOUT LONG-TERM CURRENT USE OF INSULIN (H): ICD-10-CM

## 2020-12-15 DIAGNOSIS — E03.9 ACQUIRED HYPOTHYROIDISM: Primary | ICD-10-CM

## 2020-12-15 DIAGNOSIS — R12 HEARTBURN: ICD-10-CM

## 2020-12-15 DIAGNOSIS — M18.12 PRIMARY OSTEOARTHRITIS OF FIRST CARPOMETACARPAL JOINT OF LEFT HAND: ICD-10-CM

## 2020-12-15 DIAGNOSIS — Z98.84 LAP-BAND SURGERY STATUS: ICD-10-CM

## 2020-12-15 DIAGNOSIS — B07.8 COMMON WART: ICD-10-CM

## 2020-12-15 DIAGNOSIS — Z23 NEED FOR PNEUMOCOCCAL VACCINE: ICD-10-CM

## 2020-12-15 PROCEDURE — G0463 HOSPITAL OUTPT CLINIC VISIT: HCPCS | Mod: 25

## 2020-12-15 PROCEDURE — 99214 OFFICE O/P EST MOD 30 MIN: CPT | Performed by: FAMILY MEDICINE

## 2020-12-15 PROCEDURE — G0463 HOSPITAL OUTPT CLINIC VISIT: HCPCS

## 2020-12-15 PROCEDURE — G0009 ADMIN PNEUMOCOCCAL VACCINE: HCPCS

## 2020-12-15 SDOH — HEALTH STABILITY: PHYSICAL HEALTH: ON AVERAGE, HOW MANY MINUTES DO YOU ENGAGE IN EXERCISE AT THIS LEVEL?: 30 MIN

## 2020-12-15 SDOH — HEALTH STABILITY: PHYSICAL HEALTH: ON AVERAGE, HOW MANY DAYS PER WEEK DO YOU ENGAGE IN MODERATE TO STRENUOUS EXERCISE (LIKE A BRISK WALK)?: 3 DAYS

## 2020-12-15 ASSESSMENT — PAIN SCALES - GENERAL: PAINLEVEL: MILD PAIN (3)

## 2020-12-15 NOTE — NURSING NOTE
Chief Complaint   Patient presents with     Recheck Medication     Would like to talk about various things. Main thing is her thyroid medication.     Medication Reconciliation: complete    Aislinn Silvestre LPN

## 2020-12-15 NOTE — PROGRESS NOTES
Nursing Notes:   Aislinn Silvestre LPN  12/15/2020 11:01 AM  Signed  Chief Complaint   Patient presents with     Recheck Medication     Would like to talk about various things. Main thing is her thyroid medication.     Medication Reconciliation: complete    Aislinn Silvestre LPN         SUBJECTIVE:   CC:  Neelam Miller is a 65 year old female who presents to clinic today for the following health issues:  Medication follow up     HPI  Neelam Miller is a 65 year old female who presents for medication follow up for her thyroid.    Hypothyroidism: This fall, she had a change in her thyroid medication, formulation change.  Thyroid - she thinks this is more stable now.  She now takes her armour thyroid by itself.  With doing this, she was getting more heartburn beforehand.  She has had thyroid testing done after this change, had normal free T4 and TSH.    Wonders about reflux medication: Continues to get heartburn related to eating vegetables. Tums doesn't help and makes her constipated. She has taken omeprazole and this has helped.  She would estimate needing to take this once a week.      Type 2 diabetes: She is on aspirin and a statin.  She has had previous lap band surgery.  Lab Results   Component Value Date    A1C 6.8 10/02/2020    A1C 6.7 03/10/2020    A1C 6.7 12/17/2019    A1C 6.3 09/11/2019    A1C 6.4 03/19/2019       Diabetic foot exam is due.  She thinks she has the start of bunions.  She has seen a nurse for toenail care.  Eye exam is up-to-date.    Bilateral thumb osteoarthritis - has brace for the left.  She did see a hand surgeon this fall.  She hasn't been playing as much guitar though.      Wart care:  Left 2nd/3rd fingers with overlying callus.  This is her dominant hand.    She is due for pneumonia vaccine as well.  Flu vaccine is up-to-date.        Stressors:  Care giver role       Allergies   Allergen Reactions     Alkylamines Other (See Comments)     Other reaction(s):  Insomnia  Antihistamines  insomnia     Corn Dextrin      corn     Corn Oil Other (See Comments)     Allergy testing     Gluten      Gluten Meal Other (See Comments)     Allergy testing     Lac Bovis Nausea and Vomiting     Milk Protein      Milk       Mold Other (See Comments)     Allergy testing     Molds & Smuts Other (See Comments)     Allergy testing     Wheat Bran Other (See Comments)     Allergy testing     Current Outpatient Medications   Medication     albuterol (PROAIR HFA/PROVENTIL HFA/VENTOLIN HFA) 108 (90 Base) MCG/ACT inhaler     Ascorbic Acid 1000 MG TABS     aspirin 81 MG tablet     B Complex Vitamins (VITAMIN-B COMPLEX) TABS     Calcium-Vitamin D-Vitamin K 500-100-40 MG-UNT-MCG CHEW     clotrimazole-betamethasone (LOTRISONE) cream     ERGOCALCIFEROL     fluconazole (DIFLUCAN) 100 MG tablet     hydrochlorothiazide (HYDRODIURIL) 25 MG tablet     lisinopril (PRINIVIL/ZESTRIL) 10 MG tablet     Magnesium 400 MG CAPS     Multiple Vitamin (MULTI-VITAMINS) TABS     Omega-3 Fatty Acids (OMEGA-3 EPA FISH OIL PO)     simvastatin (ZOCOR) 20 MG tablet     thyroid (ARMOUR) 180 MG tablet     zinc gluconate 30 MG TABS     Current Facility-Administered Medications   Medication     ipratropium - albuterol 0.5 mg/2.5 mg/3 mL (DUONEB) neb solution 3 mL      Past Medical History:   Diagnosis Date     Allergic rhinitis     No Comments Provided     Essential (primary) hypertension     No Comments Provided     Hormone replacement therapy (postmenopausal)     5/04, discontinued     Hyperlipidemia     No Comments Provided     Hypothyroidism     No Comments Provided     Major depressive disorder, single episode     03/2012     Obesity     No Comments Provided     Sleep apnea     wears cpap prn     Type 2 diabetes mellitus without complication, without long-term current use of insulin (H) 3/5/2018      Past Surgical History:   Procedure Laterality Date     CHOLECYSTECTOMY      1979     COLONOSCOPY      10/5/04     COLONOSCOPY   2015     HYSTERECTOMY TOTAL ABDOMINAL, BILATERAL SALPINGO-OOPHORECTOMY, COMBINED  2002    uterine fibroids     LAPAROSCOPIC BYPASS GASTRIC  2007    lap-band     MAMMOPLASTY REDUCTION Bilateral     2/11/10     OTHER SURGICAL HISTORY  1979    CHOLANGIOGRAM TRANSHEPATIC     Family History   Problem Relation Age of Onset     Heart Disease Father         Heart Disease, at 80, had pacemaker, history of MI     Diabetes Father         Diabetes,Type 2     Thyroid Disease Sister      Family History Negative Sister         Good Health, younger sister     Hypertension Brother         Hypertension, younger brother     Diabetes Mother 40        Diabetes     Heart Disease Mother         Heart Disease, age 61 of CHF     Heart Disease Brother         Heart Disease,pericarditis     Family History Negative Sister         Good Health, younger sister     Diabetes Maternal Grandmother        Review of Systems   Asthma - has ventolin on hand - hasn't used in well over a month  ACT Total Scores 3/10/2020 12/15/2020   ACT TOTAL SCORE (Goal Greater than or Equal to 20) 25 25   In the past 12 months, how many times did you visit the emergency room for your asthma without being admitted to the hospital? 0 0   In the past 12 months, how many times were you hospitalized overnight because of your asthma? 0 0           PHQ-2 Score:     PHQ-2 (  [employer]) 12/15/2020 2020   Q1: Little interest or pleasure in doing things 0 0   Q2: Feeling down, depressed or hopeless 0 0   PHQ-2 Score 0 0         PHQ-9 SCORE 7/15/2015 2015   PHQ-9 Total Score 4 2         OBJECTIVE:     /80 (BP Location: Right arm, Patient Position: Sitting, Cuff Size: Adult Regular)   Pulse 70   Temp 97.2  F (36.2  C) (Tympanic)   Resp 16   Wt 109.8 kg (242 lb)   SpO2 97%   Breastfeeding No   BMI 36.80 kg/m    Body mass index is 36.8 kg/m .  Physical Exam  Vitals signs and nursing note reviewed.   Constitutional:       Appearance:  Normal appearance. She is obese.   Musculoskeletal:      Comments: Monofilament testing of her feet is performed, this is normal.  Normal hair distribution on her toes/lower extremities.  Normal pulses in her feet, normal capillary refill.  Mild bunion deformity bilaterally with some ingrowing of right first toenail laterally.   Neurological:      Mental Status: She is alert.   Psychiatric:         Mood and Affect: Mood normal.         Behavior: Behavior normal.         Thought Content: Thought content normal.        Wt Readings from Last 5 Encounters:   12/15/20 109.8 kg (242 lb)   05/15/20 108 kg (238 lb)   03/10/20 109.3 kg (241 lb)   12/17/19 110.7 kg (244 lb)   09/11/19 105.7 kg (233 lb)         No results found for any visits on 12/15/20.      ASSESSMENT/PLAN:       ICD-10-CM    1. Acquired hypothyroidism  E03.9    2. Type 2 diabetes mellitus without complication, without long-term current use of insulin (H)  E11.9    3. Need for pneumococcal vaccine  Z23 GH IMM-  PNEUMOCOCCAL CONJ VACCINE 13 VALENT IM (Prevnar)   4. Primary osteoarthritis of first carpometacarpal joint of left hand  M18.12    5. Common wart  B07.8    6. LAP-BAND surgery status  Z98.84    7. Heartburn  R12             PLAN:  1.  Continue same dose of [place] Thyroid at this time.  With next A1c, recommend follow-up testing/monitoring.  2.  Pneumococcal vaccine updated today.  3.  We discussed management of heartburn, over-the-counter medications.  Discussed 2 blockers versus PPIs, especially if taken on an intermittent basis.  4.  Discussed the slow increase in her hemoglobin A1c over the past 18 months.  Elias goals for activity, dietary changes, weight loss.  In the past year, she has gained about 10 pounds.  5.  Continue with thumb spica bracing as needed for thumb osteoarthritis symptoms.  6.  Foot care/nail care discussed and reviewed.    Follow-up in roughly 3 months time, A1c, follow-up thyroid testing at that time.      KANDI ESTRADA  MD SONAM  [facility]    This note was created using voice recognition software and was screened for errors in transcription.

## 2020-12-16 ASSESSMENT — ASTHMA QUESTIONNAIRES: ACT_TOTALSCORE: 25

## 2021-01-01 NOTE — TELEPHONE ENCOUNTER
"Contacted patient who states that yes indeed she is requesting a refill of this medication. \" Yes I am still taking it. It is more of as needed thing. If I feel a yeast infection coming on- I take it\".   Fluconazole (DIFLUCAN) 100 MG tablet      Last Office Visit: 03/10/2020  Future Office visit:       Routing refill request to provider for review/approval because:  Drug not active on patient's medication list. Discontinued on 12/24/2019 by PCP.     Unable to complete prescription refill per RNMedication Refill Policy.................... Clementina Damon RN ....................  9/18/2020   3:41 PM          "
Abdomen soft, non-tender, no guarding. Bowel sounds positive.

## 2021-02-28 DIAGNOSIS — E78.5 HYPERLIPIDEMIA, UNSPECIFIED HYPERLIPIDEMIA TYPE: ICD-10-CM

## 2021-03-01 RX ORDER — SIMVASTATIN 20 MG
TABLET ORAL
Qty: 90 TABLET | Refills: 3 | Status: SHIPPED | OUTPATIENT
Start: 2021-03-01 | End: 2021-04-19

## 2021-03-01 NOTE — TELEPHONE ENCOUNTER
[facility] the following:      SIMVASTATIN 20 MG TABLET  Sig: TAKE 1 TABLET BY MOUTH AT BEDTIME      Last Prescription Date:   3/10/2020  Last Fill Qty/Refills:         90, R-3    Last Office Visit:              12/15/2020   Future Office visit:           none    Prescription refilled per RN Medication Refill Policy.................... Nathan Macdonald RN ....................  3/1/2021   8:59 AM

## 2021-03-03 DIAGNOSIS — E11.9 TYPE 2 DIABETES MELLITUS WITHOUT COMPLICATION, WITHOUT LONG-TERM CURRENT USE OF INSULIN (H): ICD-10-CM

## 2021-03-03 DIAGNOSIS — I10 ESSENTIAL HYPERTENSION: ICD-10-CM

## 2021-03-03 RX ORDER — HYDROCHLOROTHIAZIDE 25 MG/1
TABLET ORAL
Qty: 90 TABLET | Refills: 1 | Status: SHIPPED | OUTPATIENT
Start: 2021-03-03 | End: 2021-08-31

## 2021-03-03 RX ORDER — LISINOPRIL 10 MG/1
TABLET ORAL
Qty: 90 TABLET | Refills: 1 | Status: SHIPPED | OUTPATIENT
Start: 2021-03-03 | End: 2021-08-31

## 2021-03-03 NOTE — TELEPHONE ENCOUNTER
"[facility] sent Rx request for the following:   hydrochlorothiazide (HYDRODIURIL) 25 MG tablet  Sig: TAKE 1 TABLET BY MOUTH EVERY DAY    Last Prescription Date:   12/24/2019  Last Fill Qty/Refills:         90, R-3    Diuretics (Including Combos) Protocol Passed - 3/3/2021 10:18 AM       Passed - Blood pressure under 140/90 in past 12 months    BP Readings from Last 3 Encounters:   12/15/20 136/80   05/15/20 (!) 142/82   03/10/20 122/84                Passed - Recent (12 mo) or future (30 days) visit within the authorizing provider's specialty    Patient has had an office visit with the authorizing provider or a provider within the authorizing providers department within the previous 12 mos or has a future within next 30 days. See \"Patient Info\" tab in inbasket, or \"Choose Columns\" in Meds & Orders section of the refill encounter.             Passed - Medication is active on med list       Passed - Patient is age 18 or older       Passed - No active pregancy on record       Passed - Normal serum creatinine on file in past 12 months    Recent Labs   Lab Test 10/02/20  0834   CR 0.82             Passed - Normal serum potassium on file in past 12 months    Recent Labs   Lab Test 10/02/20  0834   POTASSIUM 4.1                   Passed - Normal serum sodium on file in past 12 months    Recent Labs   Lab Test 10/02/20  0834                Passed - No positive pregnancy test in past 12 months     lisinopril (ZESTRIL) 10 MG tablet  Sig:TAKE 1 TABLET BY MOUTH EVERY DAY    Last Prescription Date:   12/24/2019  Last Fill Qty/Refills:         90, R-3    ACE Inhibitors (Including Combos) Protocol Passed - 3/3/2021 10:18 AM       Passed - Blood pressure under 140/90 in past 12 months    BP Readings from Last 3 Encounters:   12/15/20 136/80   05/15/20 (!) 142/82   03/10/20 122/84                Passed - Recent (12 mo) or future (30 days) visit within the authorizing provider's specialty    Patient has had an office visit " "with the authorizing provider or a provider within the authorizing providers department within the previous 12 mos or has a future within next 30 days. See \"Patient Info\" tab in inbasket, or \"Choose Columns\" in Meds & Orders section of the refill encounter.             Passed - Medication is active on med list       Passed - Patient is age 18 or older       Passed - No active pregnancy on record       Passed - Normal serum creatinine on file in past 12 months    Recent Labs   Lab Test 10/02/20  0834   CR 0.82       Ok to refill medication if creatinine is low         Passed - Normal serum potassium on file in past 12 months    Recent Labs   Lab Test 10/02/20  0834   POTASSIUM 4.1            Passed - No positive pregnancy test within past 12 months     Last Office Visit:              12/15/2020 (Jewel Dash)   Future Office visit:           None noted    Prescription approved per [facility] Refill Protocol.  Latosha Gutierrez RN ....................  3/3/2021   4:00 PM       "

## 2021-03-12 ENCOUNTER — MYC MEDICAL ADVICE (OUTPATIENT)
Dept: FAMILY MEDICINE | Facility: OTHER | Age: 66
End: 2021-03-12

## 2021-04-06 ENCOUNTER — OFFICE VISIT (OUTPATIENT)
Dept: FAMILY MEDICINE | Facility: OTHER | Age: 66
End: 2021-04-06
Attending: FAMILY MEDICINE
Payer: COMMERCIAL

## 2021-04-06 VITALS
HEART RATE: 80 BPM | TEMPERATURE: 97.9 F | SYSTOLIC BLOOD PRESSURE: 122 MMHG | BODY MASS INDEX: 36.07 KG/M2 | OXYGEN SATURATION: 97 % | RESPIRATION RATE: 16 BRPM | WEIGHT: 238 LBS | HEIGHT: 68 IN | DIASTOLIC BLOOD PRESSURE: 88 MMHG

## 2021-04-06 DIAGNOSIS — E66.01 MORBID OBESITY (H): ICD-10-CM

## 2021-04-06 DIAGNOSIS — E78.5 HYPERLIPIDEMIA, UNSPECIFIED HYPERLIPIDEMIA TYPE: ICD-10-CM

## 2021-04-06 DIAGNOSIS — E11.9 TYPE 2 DIABETES MELLITUS WITHOUT COMPLICATION, WITHOUT LONG-TERM CURRENT USE OF INSULIN (H): Primary | ICD-10-CM

## 2021-04-06 DIAGNOSIS — I10 ESSENTIAL HYPERTENSION: ICD-10-CM

## 2021-04-06 DIAGNOSIS — E03.9 ACQUIRED HYPOTHYROIDISM: ICD-10-CM

## 2021-04-06 LAB
ALBUMIN SERPL-MCNC: 4.7 G/DL (ref 3.5–5.7)
ALP SERPL-CCNC: 56 U/L (ref 34–104)
ALT SERPL W P-5'-P-CCNC: 41 U/L (ref 7–52)
ANION GAP SERPL CALCULATED.3IONS-SCNC: 7 MMOL/L (ref 3–14)
AST SERPL W P-5'-P-CCNC: 32 U/L (ref 13–39)
BILIRUB SERPL-MCNC: 0.7 MG/DL (ref 0.3–1)
BUN SERPL-MCNC: 13 MG/DL (ref 7–25)
CALCIUM SERPL-MCNC: 10.1 MG/DL (ref 8.6–10.3)
CHLORIDE SERPL-SCNC: 100 MMOL/L (ref 98–107)
CHOLEST SERPL-MCNC: 134 MG/DL
CO2 SERPL-SCNC: 30 MMOL/L (ref 21–31)
CREAT SERPL-MCNC: 0.78 MG/DL (ref 0.6–1.2)
GFR SERPL CREATININE-BSD FRML MDRD: 74 ML/MIN/{1.73_M2}
GLUCOSE SERPL-MCNC: 114 MG/DL (ref 70–105)
HBA1C MFR BLD: 6.9 % (ref 4–6)
HDLC SERPL-MCNC: 33 MG/DL (ref 23–92)
LDLC SERPL CALC-MCNC: 62 MG/DL
NONHDLC SERPL-MCNC: 101 MG/DL
POTASSIUM SERPL-SCNC: 3.9 MMOL/L (ref 3.5–5.1)
PROT SERPL-MCNC: 8.2 G/DL (ref 6.4–8.9)
SODIUM SERPL-SCNC: 137 MMOL/L (ref 134–144)
TRIGL SERPL-MCNC: 193 MG/DL

## 2021-04-06 PROCEDURE — 36415 COLL VENOUS BLD VENIPUNCTURE: CPT | Mod: ZL | Performed by: FAMILY MEDICINE

## 2021-04-06 PROCEDURE — 80061 LIPID PANEL: CPT | Mod: ZL | Performed by: FAMILY MEDICINE

## 2021-04-06 PROCEDURE — 80053 COMPREHEN METABOLIC PANEL: CPT | Mod: ZL | Performed by: FAMILY MEDICINE

## 2021-04-06 PROCEDURE — 99214 OFFICE O/P EST MOD 30 MIN: CPT | Performed by: FAMILY MEDICINE

## 2021-04-06 PROCEDURE — 83036 HEMOGLOBIN GLYCOSYLATED A1C: CPT | Mod: ZL | Performed by: FAMILY MEDICINE

## 2021-04-06 PROCEDURE — G0463 HOSPITAL OUTPT CLINIC VISIT: HCPCS

## 2021-04-06 SDOH — HEALTH STABILITY: MENTAL HEALTH: HOW OFTEN DO YOU HAVE 6 OR MORE DRINKS ON ONE OCCASION?: NOT ASKED

## 2021-04-06 SDOH — HEALTH STABILITY: MENTAL HEALTH: HOW MANY STANDARD DRINKS CONTAINING ALCOHOL DO YOU HAVE ON A TYPICAL DAY?: NOT ASKED

## 2021-04-06 SDOH — HEALTH STABILITY: MENTAL HEALTH: HOW OFTEN DO YOU HAVE A DRINK CONTAINING ALCOHOL?: 2-4 TIMES A MONTH

## 2021-04-06 ASSESSMENT — PAIN SCALES - GENERAL: PAINLEVEL: MILD PAIN (2)

## 2021-04-06 ASSESSMENT — MIFFLIN-ST. JEOR: SCORE: 1668.06

## 2021-04-06 NOTE — NURSING NOTE
"Chief Complaint   Patient presents with     Recheck Medication     Patient is here for a recheck on medications. She does have an upcoming appointment with Endocrinology. She states after having her COVID vaccine her blood sugars have been uncontrolled.     Initial /88 (BP Location: Right arm, Patient Position: Sitting, Cuff Size: Adult Large)   Pulse 80   Temp 97.9  F (36.6  C) (Tympanic)   Resp 16   Ht 1.727 m (5' 8\")   Wt 108 kg (238 lb)   LMP  (LMP Unknown)   SpO2 97%   Breastfeeding No   BMI 36.19 kg/m   Estimated body mass index is 36.19 kg/m  as calculated from the following:    Height as of this encounter: 1.727 m (5' 8\").    Weight as of this encounter: 108 kg (238 lb).  Medication Reconciliation: complete    Aislinn Madrid MA  "

## 2021-04-06 NOTE — PROGRESS NOTES
"Nursing Notes:   Aislinn Madrid MA  4/6/2021 11:47 AM  Signed  Chief Complaint   Patient presents with     Recheck Medication     Patient is here for a recheck on medications. She does have an upcoming appointment with Endocrinology. She states after having her COVID vaccine her blood sugars have been uncontrolled.     Initial /88 (BP Location: Right arm, Patient Position: Sitting, Cuff Size: Adult Large)   Pulse 80   Temp 97.9  F (36.6  C) (Tympanic)   Resp 16   Ht 1.727 m (5' 8\")   Wt 108 kg (238 lb)   LMP  (LMP Unknown)   SpO2 97%   Breastfeeding No   BMI 36.19 kg/m   Estimated body mass index is 36.19 kg/m  as calculated from the following:    Height as of this encounter: 1.727 m (5' 8\").    Weight as of this encounter: 108 kg (238 lb).  Medication Reconciliation: complete    Aislinn Madrid MA       SUBJECTIVE:   CC:  Neelam Miller is a 65 year old female who presents to clinic today for the following health issues:  Medication follow up     HPI  Neelam Miller is a 65 year old female who presents for medication follow up.  Patient has type 2 diabetes.  She has been concerned about her FBS.  She had been having some higher BS, 140s, a few weeks ago.  She is on aspirin 81 mg a day and a statin.  She is also on an ACE inhibitor.  She has a very old meter, 12+ years old.  Lab Results   Component Value Date    A1C 6.8 10/02/2020    A1C 6.7 03/10/2020    A1C 6.7 12/17/2019    A1C 6.3 09/11/2019    A1C 6.4 03/19/2019     Hyperlipidemia: She switched taking her simvastatin to bedtime.  She is not having GERD symptoms now.    [facility] - possible thyroid medication change/update.         Allergies   Allergen Reactions     Alkylamines Other (See Comments)     Other reaction(s): Insomnia  Antihistamines  insomnia     Corn Dextrin      corn     Corn Oil Other (See Comments)     Allergy testing     Gluten      Gluten Meal Other (See Comments)     " Allergy testing     Lac Bovis Nausea and Vomiting     Milk Protein      Milk       Mold Other (See Comments)     Allergy testing     Molds & Smuts Other (See Comments)     Allergy testing     Wheat Bran Other (See Comments)     Allergy testing     Current Outpatient Medications   Medication     albuterol (PROAIR HFA/PROVENTIL HFA/VENTOLIN HFA) 108 (90 Base) MCG/ACT inhaler     Ascorbic Acid 1000 MG TABS     aspirin 81 MG tablet     B Complex Vitamins (VITAMIN-B COMPLEX) TABS     blood glucose monitoring (NO BRAND SPECIFIED) meter device kit     Calcium-Vitamin D-Vitamin K 500-100-40 MG-UNT-MCG CHEW     clotrimazole-betamethasone (LOTRISONE) cream     ERGOCALCIFEROL     fluconazole (DIFLUCAN) 100 MG tablet     hydrochlorothiazide (HYDRODIURIL) 25 MG tablet     lisinopril (ZESTRIL) 10 MG tablet     Magnesium 400 MG CAPS     Omega-3 Fatty Acids (OMEGA-3 EPA FISH OIL PO)     simvastatin (ZOCOR) 20 MG tablet     thyroid (ARMOUR) 180 MG tablet     zinc gluconate 30 MG TABS     Current Facility-Administered Medications   Medication     ipratropium - albuterol 0.5 mg/2.5 mg/3 mL (DUONEB) neb solution 3 mL      Past Medical History:   Diagnosis Date     Allergic rhinitis     No Comments Provided     Essential (primary) hypertension     No Comments Provided     Hormone replacement therapy (postmenopausal)     5/04, discontinued     Hyperlipidemia     No Comments Provided     Hypothyroidism     No Comments Provided     Major depressive disorder, single episode     03/2012     Obesity     No Comments Provided     Sleep apnea     wears cpap prn     Type 2 diabetes mellitus without complication, without long-term current use of insulin (H) 3/5/2018      Past Surgical History:   Procedure Laterality Date     CHOLECYSTECTOMY      1979     COLONOSCOPY      10/5/04     COLONOSCOPY  03/18/2015     HYSTERECTOMY TOTAL ABDOMINAL, BILATERAL SALPINGO-OOPHORECTOMY, COMBINED  06/18/2002    uterine fibroids     LAPAROSCOPIC BYPASS GASTRIC   "12/2007    lap-band     MAMMOPLASTY REDUCTION Bilateral     2/11/10     OTHER SURGICAL HISTORY  1979    CHOLANGIOGRAM TRANSHEPATIC       Review of Systems bloated belly feeling   - doesn't seem to go anywhere     PHQ-2 Score:     PHQ-2 ( 1999 Pfizer) 4/6/2021 12/15/2020   Q1: Little interest or pleasure in doing things 0 0   Q2: Feeling down, depressed or hopeless 0 0   PHQ-2 Score 0 0         PHQ-9 SCORE 7/15/2015 11/20/2015   PHQ-9 Total Score 4 2     No flowsheet data found.          OBJECTIVE:     /88 (BP Location: Right arm, Patient Position: Sitting, Cuff Size: Adult Large)   Pulse 80   Temp 97.9  F (36.6  C) (Tympanic)   Resp 16   Ht 1.727 m (5' 8\")   Wt 108 kg (238 lb)   LMP  (LMP Unknown)   SpO2 97%   Breastfeeding No   BMI 36.19 kg/m    Wt Readings from Last 3 Encounters:   04/06/21 108 kg (238 lb)   12/15/20 109.8 kg (242 lb)   05/15/20 108 kg (238 lb)       Body mass index is 36.19 kg/m .  Physical Exam  Vitals signs and nursing note reviewed.   Constitutional:       Appearance: Normal appearance. She is obese.   Neurological:      Mental Status: She is alert.            Results for orders placed or performed in visit on 04/06/21   Comprehensive metabolic panel     Status: Abnormal   Result Value Ref Range    Sodium 137 134 - 144 mmol/L    Potassium 3.9 3.5 - 5.1 mmol/L    Chloride 100 98 - 107 mmol/L    Carbon Dioxide 30 21 - 31 mmol/L    Anion Gap 7 3 - 14 mmol/L    Glucose 114 (H) 70 - 105 mg/dL    Urea Nitrogen 13 7 - 25 mg/dL    Creatinine 0.78 0.60 - 1.20 mg/dL    GFR Estimate 74 >60 mL/min/[1.73_m2]    GFR Estimate If Black 89 >60 mL/min/[1.73_m2]    Calcium 10.1 8.6 - 10.3 mg/dL    Bilirubin Total 0.7 0.3 - 1.0 mg/dL    Albumin 4.7 3.5 - 5.7 g/dL    Protein Total 8.2 6.4 - 8.9 g/dL    Alkaline Phosphatase 56 34 - 104 U/L    ALT 41 7 - 52 U/L    AST 32 13 - 39 U/L   Lipid Profile     Status: Abnormal   Result Value Ref Range    Cholesterol 134 <200 mg/dL    Triglycerides 193 (H) " <150 mg/dL    HDL Cholesterol 33 23 - 92 mg/dL    LDL Cholesterol Calculated 62 <100 mg/dL    Non HDL Cholesterol 101 <130 mg/dL   Hemoglobin A1c     Status: Abnormal   Result Value Ref Range    Hemoglobin A1C 6.9 (H) 4.0 - 6.0 %         ASSESSMENT/PLAN:     1. Type 2 diabetes mellitus without complication, without long-term current use of insulin (H)    2. Acquired hypothyroidism    3. Morbid obesity (H)    4. Essential hypertension    5. Hyperlipidemia, unspecified hyperlipidemia type        Assessment & Plan   Problem List Items Addressed This Visit        Digestive    Morbid obesity (H)       Endocrine    Hyperlipidemia    Hypothyroidism    Type 2 diabetes mellitus without complication, without long-term current use of insulin (H) - Primary    Relevant Medications    blood glucose monitoring (NO BRAND SPECIFIED) meter device kit    Other Relevant Orders    Hemoglobin A1c (Completed)    Lipid Profile (Completed)    Comprehensive metabolic panel (Completed)       Circulatory    Essential hypertension          1.  I have personally reviewed the labs listed above.  2.  Her type 2 diabetes is still controlled, but A1c levels have slowly increased over the past year.  Starting a lower dose of metformin could be considered, in particular as this may also help with weight management.  3.  Continue same treatment for hypertension.  4.  Continue same treatment for hyperlipidemia.  5.  She wishes to wait on any additional medication changes until after she has had her evaluation done at [facility].  We will follow-up after that time.  {Provider  Link to [facility] Help Grid :15      KANDI MASTERS MD  [facility] AND [facility]

## 2021-04-10 ENCOUNTER — HEALTH MAINTENANCE LETTER (OUTPATIENT)
Age: 66
End: 2021-04-10

## 2021-04-12 PROBLEM — E66.01 MORBID OBESITY (H): Status: ACTIVE | Noted: 2021-04-12

## 2021-04-19 ENCOUNTER — MYC MEDICAL ADVICE (OUTPATIENT)
Dept: FAMILY MEDICINE | Facility: OTHER | Age: 66
End: 2021-04-19

## 2021-04-19 DIAGNOSIS — E11.9 TYPE 2 DIABETES MELLITUS WITHOUT COMPLICATION, WITHOUT LONG-TERM CURRENT USE OF INSULIN (H): Primary | ICD-10-CM

## 2021-04-19 DIAGNOSIS — E78.5 HYPERLIPIDEMIA, UNSPECIFIED HYPERLIPIDEMIA TYPE: ICD-10-CM

## 2021-04-19 RX ORDER — EZETIMIBE 10 MG/1
10 TABLET ORAL DAILY
Qty: 90 TABLET | Refills: 3 | Status: SHIPPED | OUTPATIENT
Start: 2021-04-19 | End: 2021-05-21

## 2021-04-21 ENCOUNTER — MEDICAL CORRESPONDENCE (OUTPATIENT)
Dept: HEALTH INFORMATION MANAGEMENT | Facility: OTHER | Age: 66
End: 2021-04-21

## 2021-05-20 ENCOUNTER — MYC MEDICAL ADVICE (OUTPATIENT)
Dept: FAMILY MEDICINE | Facility: OTHER | Age: 66
End: 2021-05-20

## 2021-05-20 DIAGNOSIS — E11.9 TYPE 2 DIABETES MELLITUS WITHOUT COMPLICATION, WITHOUT LONG-TERM CURRENT USE OF INSULIN (H): ICD-10-CM

## 2021-05-20 DIAGNOSIS — E78.5 HYPERLIPIDEMIA, UNSPECIFIED HYPERLIPIDEMIA TYPE: ICD-10-CM

## 2021-05-20 RX ORDER — BLOOD SUGAR DIAGNOSTIC
STRIP MISCELLANEOUS
Qty: 100 STRIP | Refills: 1 | Status: SHIPPED | OUTPATIENT
Start: 2021-05-20 | End: 2021-11-03

## 2021-05-20 NOTE — TELEPHONE ENCOUNTER
[facility] sent Rx request for the following:   ONETOUCH ULTRA test strip  Sig: USE TO TEST BLOOD SUGAR 1 TIMES DAILY OR AS DIRECTED. DISPENSE STRIPS AND LANCETS WITH IT.    Last Prescription Date:   none  Last Fill Qty/Refills:         0, R-0    Last Office Visit:              04/06/2021 (Jewel Dash)   Future Office visit:           None noted   Diabetic Supplies Protocol Failed - 5/20/2021 12:08 AM        Failed - Medication is active on med list     Routing for PCP review. New Rx requests. Unable to complete prescription refill per RN Medication Refill Policy.................... Latosha Barrera RN ....................  5/20/2021   8:15 AM

## 2021-05-21 RX ORDER — SIMVASTATIN 20 MG
20 TABLET ORAL AT BEDTIME
Qty: 90 TABLET | Refills: 3 | Status: SHIPPED | OUTPATIENT
Start: 2021-05-21 | End: 2022-06-13

## 2021-05-21 RX ORDER — BLOOD-GLUCOSE METER
EACH MISCELLANEOUS
COMMUNITY
Start: 2021-04-06

## 2021-05-21 RX ORDER — LANCETS 33 GAUGE
EACH MISCELLANEOUS
COMMUNITY
Start: 2021-04-06

## 2021-05-26 DIAGNOSIS — E03.9 PRIMARY HYPOTHYROIDISM: ICD-10-CM

## 2021-05-26 LAB
T3FREE SERPL-MCNC: 2.5 PG/ML (ref 2.5–3.9)
T4 FREE SERPL-MCNC: 0.34 NG/DL (ref 0.6–1.6)
TSH SERPL DL<=0.05 MIU/L-ACNC: 31.2 IU/ML (ref 0.34–5.6)

## 2021-05-26 PROCEDURE — 84439 ASSAY OF FREE THYROXINE: CPT | Mod: ZL | Performed by: NURSE PRACTITIONER

## 2021-05-26 PROCEDURE — 36415 COLL VENOUS BLD VENIPUNCTURE: CPT | Mod: ZL | Performed by: NURSE PRACTITIONER

## 2021-05-26 PROCEDURE — 84481 FREE ASSAY (FT-3): CPT | Mod: ZL | Performed by: NURSE PRACTITIONER

## 2021-05-26 PROCEDURE — 86376 MICROSOMAL ANTIBODY EACH: CPT | Mod: ZL | Performed by: NURSE PRACTITIONER

## 2021-05-26 PROCEDURE — 84482 T3 REVERSE: CPT | Mod: ZL | Performed by: NURSE PRACTITIONER

## 2021-05-26 PROCEDURE — 84443 ASSAY THYROID STIM HORMONE: CPT | Mod: ZL | Performed by: NURSE PRACTITIONER

## 2021-05-27 LAB — THYROPEROXIDASE AB SERPL-ACNC: 190 IU/ML

## 2021-05-29 LAB — T3REVERSE SERPL-MCNC: 5.7 NG/DL (ref 9–27)

## 2021-06-04 DIAGNOSIS — E03.9 PRIMARY HYPOTHYROIDISM: Primary | ICD-10-CM

## 2021-06-23 ENCOUNTER — HOSPITAL ENCOUNTER (OUTPATIENT)
Dept: MAMMOGRAPHY | Facility: OTHER | Age: 66
Discharge: HOME | End: 2021-06-23
Attending: FAMILY MEDICINE | Admitting: FAMILY MEDICINE
Payer: COMMERCIAL

## 2021-06-23 DIAGNOSIS — Z12.31 VISIT FOR SCREENING MAMMOGRAM: ICD-10-CM

## 2021-06-23 PROCEDURE — 77063 BREAST TOMOSYNTHESIS BI: CPT

## 2021-07-08 DIAGNOSIS — E03.9 PRIMARY HYPOTHYROIDISM: ICD-10-CM

## 2021-07-08 LAB
T3FREE SERPL-MCNC: 3.1 PG/ML (ref 2.5–3.9)
T4 FREE SERPL-MCNC: 0.5 NG/DL (ref 0.6–1.6)
TSH SERPL DL<=0.05 MIU/L-ACNC: 17.08 IU/ML (ref 0.34–5.6)

## 2021-07-08 PROCEDURE — 36415 COLL VENOUS BLD VENIPUNCTURE: CPT | Mod: ZL | Performed by: NURSE PRACTITIONER

## 2021-07-08 PROCEDURE — 84481 FREE ASSAY (FT-3): CPT | Mod: ZL | Performed by: NURSE PRACTITIONER

## 2021-07-08 PROCEDURE — 84443 ASSAY THYROID STIM HORMONE: CPT | Mod: ZL | Performed by: NURSE PRACTITIONER

## 2021-07-08 PROCEDURE — 86376 MICROSOMAL ANTIBODY EACH: CPT | Mod: ZL | Performed by: NURSE PRACTITIONER

## 2021-07-08 PROCEDURE — 84482 T3 REVERSE: CPT | Mod: ZL | Performed by: NURSE PRACTITIONER

## 2021-07-08 PROCEDURE — 84439 ASSAY OF FREE THYROXINE: CPT | Mod: ZL | Performed by: NURSE PRACTITIONER

## 2021-07-09 LAB — THYROPEROXIDASE AB SERPL-ACNC: 263 IU/ML

## 2021-07-11 LAB — T3REVERSE SERPL-MCNC: 8.6 NG/DL (ref 9–27)

## 2021-08-23 ENCOUNTER — LAB (OUTPATIENT)
Dept: LAB | Facility: OTHER | Age: 66
End: 2021-08-23
Attending: NURSE PRACTITIONER
Payer: COMMERCIAL

## 2021-08-23 DIAGNOSIS — E03.9 PRIMARY HYPOTHYROIDISM: ICD-10-CM

## 2021-08-23 LAB
T3FREE SERPL-MCNC: 2.6 PG/ML (ref 2.5–3.9)
T4 FREE SERPL-MCNC: 0.43 NG/DL (ref 0.6–1.6)
TSH SERPL DL<=0.005 MIU/L-ACNC: 23.09 MU/L (ref 0.4–4)

## 2021-08-23 PROCEDURE — 84482 T3 REVERSE: CPT | Mod: ZL

## 2021-08-23 PROCEDURE — 36415 COLL VENOUS BLD VENIPUNCTURE: CPT | Mod: ZL

## 2021-08-23 PROCEDURE — 86376 MICROSOMAL ANTIBODY EACH: CPT | Mod: ZL

## 2021-08-23 PROCEDURE — 84481 FREE ASSAY (FT-3): CPT | Mod: ZL

## 2021-08-23 PROCEDURE — 84443 ASSAY THYROID STIM HORMONE: CPT | Mod: ZL

## 2021-08-23 PROCEDURE — 84439 ASSAY OF FREE THYROXINE: CPT | Mod: ZL

## 2021-08-24 LAB — THYROPEROXIDASE AB SERPL-ACNC: 291 IU/ML

## 2021-08-28 DIAGNOSIS — I10 ESSENTIAL HYPERTENSION: ICD-10-CM

## 2021-08-28 DIAGNOSIS — E11.9 TYPE 2 DIABETES MELLITUS WITHOUT COMPLICATION, WITHOUT LONG-TERM CURRENT USE OF INSULIN (H): ICD-10-CM

## 2021-08-28 LAB — T3REVERSE SERPL-MCNC: 6.7 NG/DL

## 2021-08-31 RX ORDER — HYDROCHLOROTHIAZIDE 25 MG/1
TABLET ORAL
Qty: 90 TABLET | Refills: 1 | Status: SHIPPED | OUTPATIENT
Start: 2021-08-31 | End: 2022-03-01

## 2021-08-31 RX ORDER — LISINOPRIL 10 MG/1
TABLET ORAL
Qty: 90 TABLET | Refills: 1 | Status: SHIPPED | OUTPATIENT
Start: 2021-08-31 | End: 2022-03-01

## 2021-08-31 NOTE — TELEPHONE ENCOUNTER
[facility] sent Rx request for the following:      Requested Prescriptions   Pending Prescriptions Disp Refills     hydrochlorothiazide (HYDRODIURIL) 25 MG tablet [Pharmacy Med Name: HYDROCHLOROTHIAZIDE 25 MG TAB] 90 tablet 1     Sig: TAKE 1 TABLET BY MOUTH EVERY DAY       Diuretics (Including Combos) Protocol Passed - 8/31/2021 11:17 AM     Last Prescription Date:   3/3/21   Last Fill Qty/Refills:         90, R-1        lisinopril (ZESTRIL) 10 MG tablet [Pharmacy Med Name: LISINOPRIL 10 MG TABLET] 90 tablet 1     Sig: TAKE 1 TABLET BY MOUTH EVERY DAY       ACE Inhibitors (Including Combos) Protocol Passed - 8/31/2021 11:17 AM        Last Prescription Date:   3/3/21  Last Fill Qty/Refills:         90, R-1  Last Office Visit:              4/6/21  Future Office visit:           None  Routing refill request to provider for review/approval because:    Routing to provider for review. Christiane Muniz RN on 8/31/2021 at 11:27 AM

## 2021-09-19 ENCOUNTER — HEALTH MAINTENANCE LETTER (OUTPATIENT)
Age: 66
End: 2021-09-19

## 2021-10-07 ENCOUNTER — LAB (OUTPATIENT)
Dept: LAB | Facility: OTHER | Age: 66
End: 2021-10-07
Attending: NURSE PRACTITIONER
Payer: COMMERCIAL

## 2021-10-07 DIAGNOSIS — E03.9 PRIMARY HYPOTHYROIDISM: Primary | ICD-10-CM

## 2021-10-07 LAB
T3FREE SERPL-MCNC: 3 PG/ML (ref 2.5–3.9)
T4 FREE SERPL-MCNC: 0.48 NG/DL (ref 0.6–1.6)
TSH SERPL DL<=0.005 MIU/L-ACNC: 14.03 MU/L (ref 0.4–4)

## 2021-10-07 PROCEDURE — 36415 COLL VENOUS BLD VENIPUNCTURE: CPT | Mod: ZL

## 2021-10-07 PROCEDURE — 84482 T3 REVERSE: CPT | Mod: ZL

## 2021-10-07 PROCEDURE — 84443 ASSAY THYROID STIM HORMONE: CPT | Mod: ZL

## 2021-10-07 PROCEDURE — 84439 ASSAY OF FREE THYROXINE: CPT | Mod: ZL

## 2021-10-07 PROCEDURE — 84481 FREE ASSAY (FT-3): CPT | Mod: ZL

## 2021-10-07 PROCEDURE — 86376 MICROSOMAL ANTIBODY EACH: CPT | Mod: ZL

## 2021-10-08 LAB — THYROPEROXIDASE AB SERPL-ACNC: 323 IU/ML

## 2021-10-12 LAB — T3REVERSE SERPL-MCNC: 8 NG/DL

## 2021-10-13 ENCOUNTER — MYC MEDICAL ADVICE (OUTPATIENT)
Dept: FAMILY MEDICINE | Facility: OTHER | Age: 66
End: 2021-10-13

## 2021-10-13 DIAGNOSIS — E03.9 ACQUIRED HYPOTHYROIDISM: Primary | ICD-10-CM

## 2021-10-22 ENCOUNTER — TRANSFERRED RECORDS (OUTPATIENT)
Dept: HEALTH INFORMATION MANAGEMENT | Facility: OTHER | Age: 66
End: 2021-10-22

## 2021-10-22 LAB — RETINOPATHY: NEGATIVE

## 2021-11-01 NOTE — PROGRESS NOTES
"Nursing Notes:   Aislinn Madrid MA  11/3/2021  9:13 AM  Signed  Chief Complaint   Patient presents with     Diabetes     Thyroid Problem     Patient is here for diabetes check and follow up on thyroid     Initial /88 (BP Location: Right arm, Patient Position: Sitting, Cuff Size: Adult Large)   Pulse 76   Temp 98.1  F (36.7  C) (Tympanic)   Resp 16   Wt 107.9 kg (237 lb 12.8 oz)   LMP  (LMP Unknown)   SpO2 98%   Breastfeeding No   BMI 36.16 kg/m   Estimated body mass index is 36.16 kg/m  as calculated from the following:    Height as of 4/6/21: 1.727 m (5' 8\").    Weight as of this encounter: 107.9 kg (237 lb 12.8 oz).  Medication Reconciliation: complete    Aislinn Madrid MA     FOOD SECURITY SCREENING QUESTIONS  Hunger Vital Signs:  Within the past 12 months we worried whether our food would run out before we got money to buy more. Never  Within the past 12 months the food we bought just didn't last and we didn't have money to get more. Never     Aislinn Madrid MA 11/3/2021 9:08 AM         SUBJECTIVE:   CC:  Neelam Miller is a 66 year old female who presents to clinic today for the following health issues: Hypothyroid, diabetes, skin concerns    HPI  Neelam Miller is a 66 year old female who presents for evaluation of multiple issues.     Hypothyroid  Was seeing a naturopath in the [facility] to get on a natural thyroid hormone replacement. Was struggling with brain fog, muscle aches, not feeling well, TSH not well controlled. Ultimately went back on [place] within the past few weeks and is feeling much better, no more brain fog or muscle aches, constipation seems to be resolving. Satisfied with staying on [place] for now. Last TSH was October 7th and was 14. Started at [place] 120 mg, in 60 mg pills so they are smaller and easier to swallow, currently on 150 mg, ultimately will go up to 180 mg.    Also saw an endocrinologist yesterday who recommended synthroid, which is not something she is " interested in taking, especially since she is feeling so much better on the armour.     Diabetes  Last A1c 6.9, puts her in the diabetes range. Prefers to manage this with diet and exercise, does not want to be on metformin. Grows vegetables at home, cooks for herself and her partner, follows healthy diet. Now that she is feeling better from a thyroid standpoint, will restart an exercise program as well.     Skin concerns  Had some dryness and flaking on her nose, lotrisone cream worked well for this. Now having same issue in her ear and behind her ear on the scalp, has been using this cream there as well with good results, needs a refill.     Also has a few warts on her right hand, has been using OTC freezer on them, though they are not entirely healed.     Would like to talk to someone about the port in her belly for her lap band. Last adjustment was over a year ago.     Lab Results   Component Value Date    A1C 6.9 04/06/2021    A1C 6.8 10/02/2020    A1C 6.7 03/10/2020    A1C 6.7 12/17/2019    A1C 6.3 09/11/2019          Allergies   Allergen Reactions     Alkylamines Other (See Comments)     Other reaction(s): Insomnia  Antihistamines  insomnia     Corn Dextrin      corn     Corn Oil Other (See Comments)     Allergy testing     Gluten      Gluten Meal Other (See Comments)     Allergy testing     Lac Bovis Nausea and Vomiting     Milk Protein      Milk       Mold Other (See Comments)     Allergy testing     Molds & Smuts Other (See Comments)     Allergy testing     Wheat Bran Other (See Comments)     Allergy testing     Current Outpatient Medications   Medication     albuterol (PROAIR HFA/PROVENTIL HFA/VENTOLIN HFA) 108 (90 Base) MCG/ACT inhaler     Ascorbic Acid 1000 MG TABS     aspirin 81 MG tablet     B Complex Vitamins (VITAMIN-B COMPLEX) TABS     blood glucose (NO BRAND SPECIFIED) lancets standard     blood glucose (ONETOUCH ULTRA) test strip     blood glucose monitoring (ONE TOUCH ULTRA 2) meter device kit      Calcium-Vitamin D-Vitamin K 500-100-40 MG-UNT-MCG CHEW     clotrimazole-betamethasone (LOTRISONE) 1-0.05 % external cream     ERGOCALCIFEROL     fluconazole (DIFLUCAN) 100 MG tablet     hydrochlorothiazide (HYDRODIURIL) 25 MG tablet     Lancets (ONETOUCH DELICA PLUS [id number]) MISC     lisinopril (ZESTRIL) 10 MG tablet     Magnesium 400 MG CAPS     Omega-3 Fatty Acids (OMEGA-3 EPA FISH OIL PO)     simvastatin (ZOCOR) 20 MG tablet     thyroid (ARMOUR) 180 MG tablet     zinc gluconate 30 MG TABS     Current Facility-Administered Medications   Medication     ipratropium - albuterol 0.5 mg/2.5 mg/3 mL (DUONEB) neb solution 3 mL      Past Medical History:   Diagnosis Date     Allergic rhinitis     No Comments Provided     Essential (primary) hypertension     No Comments Provided     Hormone replacement therapy (postmenopausal)     5/04, discontinued     Hyperlipidemia     No Comments Provided     Hypothyroidism     No Comments Provided     Major depressive disorder, single episode     03/2012     Obesity     No Comments Provided     Sleep apnea     wears cpap prn     Type 2 diabetes mellitus without complication, without long-term current use of insulin (H) 3/5/2018      Past Surgical History:   Procedure Laterality Date     CHOLECYSTECTOMY      1979     COLONOSCOPY      10/5/04     COLONOSCOPY  03/18/2015     HYSTERECTOMY TOTAL ABDOMINAL, BILATERAL SALPINGO-OOPHORECTOMY, COMBINED  06/18/2002    uterine fibroids     LAPAROSCOPIC BYPASS GASTRIC  12/2007    lap-band     MAMMOPLASTY REDUCTION Bilateral     2/11/10     OTHER SURGICAL HISTORY  1979    CHOLANGIOGRAM TRANSHEPATIC       Review of Systems  Would like her flu vaccine today.      PHQ-2 Score:     PHQ-2 ( 1999 Pfizer) 11/3/2021 4/6/2021   Q1: Little interest or pleasure in doing things 0 0   Q2: Feeling down, depressed or hopeless 0 0   PHQ-2 Score 0 0         PHQ-9 SCORE 7/15/2015 11/20/2015   PHQ-9 Total Score 4 2     No flowsheet data  found.          OBJECTIVE:     /88 (BP Location: Right arm, Patient Position: Sitting, Cuff Size: Adult Large)   Pulse 76   Temp 98.1  F (36.7  C) (Tympanic)   Resp 16   Wt 107.9 kg (237 lb 12.8 oz)   LMP  (LMP Unknown)   SpO2 98%   Breastfeeding No   BMI 36.16 kg/m    Wt Readings from Last 3 Encounters:   11/03/21 107.9 kg (237 lb 12.8 oz)   04/06/21 108 kg (238 lb)   12/15/20 109.8 kg (242 lb)       Nursing notes and VS reviewed.      Body mass index is 36.16 kg/m .  Physical Exam  Constitutional:       General: She is not in acute distress.     Appearance: Normal appearance. She is not toxic-appearing.   HENT:      Head: Normocephalic and atraumatic.      Right Ear: Tympanic membrane, ear canal and external ear normal.      Left Ear: Tympanic membrane, ear canal and external ear normal.      Nose: Nose normal. No congestion.      Mouth/Throat:      Mouth: Mucous membranes are moist.      Pharynx: Oropharynx is clear. No oropharyngeal exudate or posterior oropharyngeal erythema.   Eyes:      Extraocular Movements: Extraocular movements intact.      Conjunctiva/sclera: Conjunctivae normal.      Pupils: Pupils are equal, round, and reactive to light.   Cardiovascular:      Rate and Rhythm: Normal rate and regular rhythm.      Pulses: Normal pulses.      Heart sounds: Normal heart sounds. No murmur heard.   No friction rub. No gallop.    Pulmonary:      Effort: Pulmonary effort is normal. No respiratory distress.      Breath sounds: Normal breath sounds. No wheezing or rales.   Abdominal:      General: Bowel sounds are normal. There is no distension.      Palpations: Abdomen is soft.      Tenderness: There is no abdominal tenderness. There is no guarding.   Musculoskeletal:         General: No swelling or deformity.      Cervical back: Neck supple. No rigidity or tenderness.   Lymphadenopathy:      Cervical: No cervical adenopathy.   Skin:     General: Skin is warm and dry.      Comments: <1 cm healing  warts on second and third finger of right hand, near nail bed   Neurological:      General: No focal deficit present.      Mental Status: She is alert and oriented to person, place, and time. Mental status is at baseline.      Comments: Full sensation with microfilament testing in feet bilaterally   Psychiatric:         Mood and Affect: Mood normal.         Behavior: Behavior normal.          Results for orders placed or performed in visit on 11/03/21   Hemoglobin A1c     Status: Abnormal   Result Value Ref Range    Hemoglobin A1C 6.7 (H) 4.0 - 6.2 %         ASSESSMENT/PLAN:     1. Acquired hypothyroidism    2. Need for prophylactic vaccination and inoculation against influenza    3. Mild intermittent asthma, unspecified whether complicated    4. Type 2 diabetes mellitus without complication, without long-term current use of insulin (H)    5. Yeast infection    6. Status post bariatric surgery    7. Rash    8. Common wart        Assessment & Plan   Problem List Items Addressed This Visit        Endocrine    Hypothyroidism - Primary    Relevant Medications    thyroid (ARMOUR) 180 MG tablet    Other Relevant Orders    TSH    Type 2 diabetes mellitus without complication, without long-term current use of insulin (H)    Relevant Medications    blood glucose (NO BRAND SPECIFIED) lancets standard    blood glucose (ONETOUCH ULTRA) test strip    thyroid (ARMOUR) 180 MG tablet    Other Relevant Orders    Hemoglobin A1c (Completed)    Albumin Random Urine Quantitative with Creat Ratio      Other Visit Diagnoses     Need for prophylactic vaccination and inoculation against influenza        Mild intermittent asthma, unspecified whether complicated        Relevant Medications    albuterol (PROAIR HFA/PROVENTIL HFA/VENTOLIN HFA) 108 (90 Base) MCG/ACT inhaler    Yeast infection        Relevant Medications    albuterol (PROAIR HFA/PROVENTIL HFA/VENTOLIN HFA) 108 (90 Base) MCG/ACT inhaler    fluconazole (DIFLUCAN) 100 MG tablet     Status post bariatric surgery        Relevant Orders    Adult General Surg Referral    Rash        Relevant Medications    albuterol (PROAIR HFA/PROVENTIL HFA/VENTOLIN HFA) 108 (90 Base) MCG/ACT inhaler    clotrimazole-betamethasone (LOTRISONE) 1-0.05 % external cream    Common wart        Relevant Orders    DESTRUCT BENIGN LESION, UP TO 14          - Patient currently feels hypothyroid is well controlled with [place], on 150 mg with titration goal of 180 mg. Discussed returning to clinic for TSH and free T4 check 6 weeks after reaching 180 mg / day.     - Patient is in diabetic range with last A1c of 6.9 7 months ago, declines metformin or other medications at this time, prefers to manage with diet and exercise. Will recheck A1c and urine albumin at this time. On aspirin and statin.     - Referral to general surgery to adjust lap band port.     - Refill of lotrisone cream for ear and scalp, counseled on appropriate use of this high-dose steroid and not to use continuously for greater than 2 weeks at a time.     - Lesions on fingers consistent with warts, cryotherapy performed in clinic today after paring off overlying callus.  Patient tolerated procedure well.      Review of the result(s) of each unique test - I have personally reviewed the labs listed above.             Tessa Thomason MD, PGY-2  [facility]    I was present with Tessa Thomason MD who participated in the service and in the documentation of this note.  I have verified the history and personally performed the physical exam medical decision making, and have verified the content of the note, which accurately reflects my assessment of the patient and the plan of care.      KANDI MASTERS MD  [facility] AND [facility]    PDMP Review     None

## 2021-11-02 ENCOUNTER — TRANSFERRED RECORDS (OUTPATIENT)
Dept: HEALTH INFORMATION MANAGEMENT | Facility: OTHER | Age: 66
End: 2021-11-02

## 2021-11-03 ENCOUNTER — OFFICE VISIT (OUTPATIENT)
Dept: FAMILY MEDICINE | Facility: OTHER | Age: 66
End: 2021-11-03
Attending: FAMILY MEDICINE
Payer: COMMERCIAL

## 2021-11-03 VITALS
RESPIRATION RATE: 16 BRPM | TEMPERATURE: 98.1 F | DIASTOLIC BLOOD PRESSURE: 88 MMHG | BODY MASS INDEX: 36.16 KG/M2 | OXYGEN SATURATION: 98 % | SYSTOLIC BLOOD PRESSURE: 126 MMHG | HEART RATE: 76 BPM | WEIGHT: 237.8 LBS

## 2021-11-03 DIAGNOSIS — R21 RASH: ICD-10-CM

## 2021-11-03 DIAGNOSIS — J45.20 MILD INTERMITTENT ASTHMA, UNSPECIFIED WHETHER COMPLICATED: ICD-10-CM

## 2021-11-03 DIAGNOSIS — E11.9 TYPE 2 DIABETES MELLITUS WITHOUT COMPLICATION, WITHOUT LONG-TERM CURRENT USE OF INSULIN (H): ICD-10-CM

## 2021-11-03 DIAGNOSIS — Z23 NEED FOR PROPHYLACTIC VACCINATION AND INOCULATION AGAINST INFLUENZA: ICD-10-CM

## 2021-11-03 DIAGNOSIS — B07.8 COMMON WART: ICD-10-CM

## 2021-11-03 DIAGNOSIS — B37.9 YEAST INFECTION: ICD-10-CM

## 2021-11-03 DIAGNOSIS — E03.9 ACQUIRED HYPOTHYROIDISM: Primary | ICD-10-CM

## 2021-11-03 DIAGNOSIS — Z98.84 STATUS POST BARIATRIC SURGERY: ICD-10-CM

## 2021-11-03 LAB
CREAT UR-MCNC: 95 MG/DL
HBA1C MFR BLD: 6.7 % (ref 4–6.2)
MICROALBUMIN UR-MCNC: 10 MG/L
MICROALBUMIN/CREAT UR: 10.53 MG/G CR (ref 0–25)

## 2021-11-03 PROCEDURE — 82043 UR ALBUMIN QUANTITATIVE: CPT | Mod: ZL | Performed by: FAMILY MEDICINE

## 2021-11-03 PROCEDURE — 36415 COLL VENOUS BLD VENIPUNCTURE: CPT | Mod: ZL | Performed by: FAMILY MEDICINE

## 2021-11-03 PROCEDURE — G0008 ADMIN INFLUENZA VIRUS VAC: HCPCS

## 2021-11-03 PROCEDURE — 83036 HEMOGLOBIN GLYCOSYLATED A1C: CPT | Mod: ZL | Performed by: FAMILY MEDICINE

## 2021-11-03 PROCEDURE — G0463 HOSPITAL OUTPT CLINIC VISIT: HCPCS | Mod: 25

## 2021-11-03 PROCEDURE — 90662 IIV NO PRSV INCREASED AG IM: CPT

## 2021-11-03 PROCEDURE — 99214 OFFICE O/P EST MOD 30 MIN: CPT | Mod: 25 | Performed by: FAMILY MEDICINE

## 2021-11-03 PROCEDURE — 17110 DESTRUCTION B9 LES UP TO 14: CPT | Performed by: FAMILY MEDICINE

## 2021-11-03 RX ORDER — BLOOD SUGAR DIAGNOSTIC
STRIP MISCELLANEOUS
Qty: 100 STRIP | Refills: 1 | Status: SHIPPED | OUTPATIENT
Start: 2021-11-03 | End: 2022-06-06

## 2021-11-03 RX ORDER — ALBUTEROL SULFATE 90 UG/1
2 AEROSOL, METERED RESPIRATORY (INHALATION) EVERY 6 HOURS
Qty: 8.5 G | Refills: 3 | Status: SHIPPED | OUTPATIENT
Start: 2021-11-03 | End: 2022-12-12

## 2021-11-03 RX ORDER — FLUCONAZOLE 100 MG/1
100 TABLET ORAL DAILY PRN
Qty: 90 TABLET | Refills: 2 | Status: SHIPPED | OUTPATIENT
Start: 2021-11-03 | End: 2022-06-06

## 2021-11-03 RX ORDER — CLOTRIMAZOLE AND BETAMETHASONE DIPROPIONATE 10; .64 MG/G; MG/G
CREAM TOPICAL 2 TIMES DAILY
Qty: 15 G | Refills: 0 | Status: SHIPPED | OUTPATIENT
Start: 2021-11-03 | End: 2021-11-17

## 2021-11-03 ASSESSMENT — PAIN SCALES - GENERAL: PAINLEVEL: NO PAIN (0)

## 2021-11-03 NOTE — PATIENT INSTRUCTIONS
Keep ultrasound appointment  TSH check 6 weeks after on 180mg dose  A1C today  Referral for lapband follow up

## 2021-11-03 NOTE — NURSING NOTE
"Chief Complaint   Patient presents with     Diabetes     Thyroid Problem     Patient is here for diabetes check and follow up on thyroid     Initial /88 (BP Location: Right arm, Patient Position: Sitting, Cuff Size: Adult Large)   Pulse 76   Temp 98.1  F (36.7  C) (Tympanic)   Resp 16   Wt 107.9 kg (237 lb 12.8 oz)   LMP  (LMP Unknown)   SpO2 98%   Breastfeeding No   BMI 36.16 kg/m   Estimated body mass index is 36.16 kg/m  as calculated from the following:    Height as of 4/6/21: 1.727 m (5' 8\").    Weight as of this encounter: 107.9 kg (237 lb 12.8 oz).  Medication Reconciliation: complete    Aislinn Madrid MA     FOOD SECURITY SCREENING QUESTIONS  Hunger Vital Signs:  Within the past 12 months we worried whether our food would run out before we got money to buy more. Never  Within the past 12 months the food we bought just didn't last and we didn't have money to get more. Never     Aislinn Madrid MA 11/3/2021 9:08 AM    Immunization Documentation    Prior to Immunization administration, verified patients identity using patient's name and date of birth. Please see IMMUNIZATIONS  and order for additional information.  Patient / Parent instructed to remain in clinic for 15 minutes and report any adverse reaction to staff immediately.    Was the entire amount of vaccines given used? Yes    Aislinn Madrid MA  11/3/2021   10:11 AM    "

## 2021-11-03 NOTE — PROGRESS NOTES
Previous A1C is at goal of <8  Lab Results   Component Value Date    A1C 6.9 04/06/2021    A1C 6.8 10/02/2020    A1C 6.7 03/10/2020    A1C 6.7 12/17/2019    A1C 6.3 09/11/2019     Urine microalbumin:creatine: N/A  Foot exam DUE SOON (done today)   Eye exam 10/22/2021    Tobacco User No   Patient is on a daily aspirin  Patient is on a Statin.  Blood pressure today of:     BP Readings from Last 1 Encounters:   11/03/21 126/88      is at the goal of <139/89 for diabetics.    Aislinn Madrid MA on 11/3/2021 at 9:09 AM      Answers for HPI/ROS submitted by the patient on 11/3/2021  Do you have a cough?: No  Are you experiencing any wheezing in your chest?: No  Do you have any shortness of breath?: No  Use of rescue inhaler:: never  Taking Asthma medication as prescribed:: 0  Asthma triggers:: animal dander, mold, pollens, smoke  Have you had any Emergency Room visits, Urgent Care visits, or Hospital Admissions since your last office visit?: No

## 2021-11-11 ENCOUNTER — HOSPITAL ENCOUNTER (OUTPATIENT)
Dept: ULTRASOUND IMAGING | Facility: OTHER | Age: 66
Discharge: HOME | End: 2021-11-11
Attending: INTERNAL MEDICINE | Admitting: INTERNAL MEDICINE
Payer: COMMERCIAL

## 2021-11-11 DIAGNOSIS — E06.3 HYPOTHYROIDISM DUE TO HASHIMOTO'S THYROIDITIS: ICD-10-CM

## 2021-11-11 PROCEDURE — 76536 US EXAM OF HEAD AND NECK: CPT

## 2021-11-28 ENCOUNTER — MYC MEDICAL ADVICE (OUTPATIENT)
Dept: FAMILY MEDICINE | Facility: OTHER | Age: 66
End: 2021-11-28
Payer: COMMERCIAL

## 2021-11-28 DIAGNOSIS — E03.9 ACQUIRED HYPOTHYROIDISM: Primary | ICD-10-CM

## 2021-11-29 RX ORDER — THYROID 60 MG/1
180 TABLET ORAL DAILY
COMMUNITY
End: 2021-11-29

## 2021-11-29 RX ORDER — THYROID 60 MG/1
180 TABLET ORAL DAILY
Qty: 270 TABLET | Refills: 3 | Status: SHIPPED | OUTPATIENT
Start: 2021-11-29 | End: 2021-12-22

## 2021-11-29 NOTE — TELEPHONE ENCOUNTER
Pending Prescriptions:                       Disp   Refills    thyroid (ARMOUR) 60 MG tablet             270 ta*3            Sig: Take 3 tablets (180 mg) by mouth daily    Signed Prescriptions:                        Disp   Refills    thyroid (ARMOUR) 60 MG tablet                              Sig: Take 180 mg by mouth daily  Authorizing Provider: PATIENT REPORTED  Ordering User: MIRTHA CRUZ    Order pending for thyroid 60 mg- take 3 tablets daily (patient wants smaller tablets)     Mirtha Cruz CMA on 11/29/2021 at 9:44 AM

## 2021-12-14 ENCOUNTER — MEDICAL CORRESPONDENCE (OUTPATIENT)
Dept: HEALTH INFORMATION MANAGEMENT | Facility: OTHER | Age: 66
End: 2021-12-14
Payer: COMMERCIAL

## 2021-12-22 ENCOUNTER — LAB (OUTPATIENT)
Dept: LAB | Facility: OTHER | Age: 66
End: 2021-12-22
Attending: FAMILY MEDICINE
Payer: COMMERCIAL

## 2021-12-22 DIAGNOSIS — E03.9 ACQUIRED HYPOTHYROIDISM: ICD-10-CM

## 2021-12-22 DIAGNOSIS — E11.9 TYPE 2 DIABETES MELLITUS (H): ICD-10-CM

## 2021-12-22 DIAGNOSIS — E06.3 HYPOTHYROIDISM DUE TO HASHIMOTO'S THYROIDITIS: ICD-10-CM

## 2021-12-22 LAB — TSH SERPL DL<=0.005 MIU/L-ACNC: 0.12 MU/L (ref 0.4–4)

## 2021-12-22 PROCEDURE — 84443 ASSAY THYROID STIM HORMONE: CPT | Mod: ZL

## 2021-12-22 PROCEDURE — 36415 COLL VENOUS BLD VENIPUNCTURE: CPT | Mod: ZL

## 2021-12-22 RX ORDER — THYROID 60 MG/1
120 TABLET ORAL DAILY
Qty: 270 TABLET | Refills: 3 | Status: SHIPPED | OUTPATIENT
Start: 2021-12-22 | End: 2022-08-03

## 2021-12-22 RX ORDER — THYROID 30 MG/1
30 TABLET ORAL DAILY
Qty: 90 TABLET | Refills: 3 | Status: SHIPPED | OUTPATIENT
Start: 2021-12-22 | End: 2022-08-03

## 2022-02-21 ENCOUNTER — LAB (OUTPATIENT)
Dept: LAB | Facility: OTHER | Age: 67
End: 2022-02-21
Attending: FAMILY MEDICINE
Payer: COMMERCIAL

## 2022-02-21 DIAGNOSIS — E03.9 ACQUIRED HYPOTHYROIDISM: ICD-10-CM

## 2022-02-21 LAB
T4 FREE SERPL-MCNC: 0.73 NG/DL (ref 0.6–1.6)
TSH SERPL DL<=0.005 MIU/L-ACNC: 0.11 MU/L (ref 0.4–4)

## 2022-02-21 PROCEDURE — 36415 COLL VENOUS BLD VENIPUNCTURE: CPT | Mod: ZL

## 2022-02-21 PROCEDURE — 84443 ASSAY THYROID STIM HORMONE: CPT | Mod: ZL

## 2022-02-21 PROCEDURE — 84439 ASSAY OF FREE THYROXINE: CPT | Mod: ZL

## 2022-02-22 DIAGNOSIS — E03.9 ACQUIRED HYPOTHYROIDISM: Primary | ICD-10-CM

## 2022-02-28 DIAGNOSIS — I10 ESSENTIAL HYPERTENSION: ICD-10-CM

## 2022-02-28 DIAGNOSIS — E11.9 TYPE 2 DIABETES MELLITUS WITHOUT COMPLICATION, WITHOUT LONG-TERM CURRENT USE OF INSULIN (H): ICD-10-CM

## 2022-03-01 RX ORDER — LISINOPRIL 10 MG/1
TABLET ORAL
Qty: 90 TABLET | Refills: 1 | Status: SHIPPED | OUTPATIENT
Start: 2022-03-01 | End: 2022-08-03

## 2022-03-01 RX ORDER — HYDROCHLOROTHIAZIDE 25 MG/1
TABLET ORAL
Qty: 90 TABLET | Refills: 1 | Status: SHIPPED | OUTPATIENT
Start: 2022-03-01 | End: 2022-08-03

## 2022-03-01 NOTE — TELEPHONE ENCOUNTER
Refill request for Lisinopril and hydrochlorothiazide from [facility].  Last office visit 11/03/2021, last refill 8/31/2021.  Both medications pass RN Refill protocol but Routing to PCP to address refill per new policy.  Unable to complete prescription refill per RN Medication Refill Policy. Martha Perry RN 3/1/2022 2:57 PM

## 2022-03-14 ENCOUNTER — TRANSFERRED RECORDS (OUTPATIENT)
Dept: HEALTH INFORMATION MANAGEMENT | Facility: OTHER | Age: 67
End: 2022-03-14
Payer: COMMERCIAL

## 2022-05-01 ENCOUNTER — HEALTH MAINTENANCE LETTER (OUTPATIENT)
Age: 67
End: 2022-05-01

## 2022-05-27 ENCOUNTER — OFFICE VISIT (OUTPATIENT)
Dept: FAMILY MEDICINE | Facility: OTHER | Age: 67
End: 2022-05-27
Attending: FAMILY MEDICINE
Payer: COMMERCIAL

## 2022-05-27 VITALS
TEMPERATURE: 98.2 F | DIASTOLIC BLOOD PRESSURE: 80 MMHG | SYSTOLIC BLOOD PRESSURE: 136 MMHG | HEIGHT: 68 IN | BODY MASS INDEX: 36.01 KG/M2 | OXYGEN SATURATION: 97 % | WEIGHT: 237.6 LBS | RESPIRATION RATE: 18 BRPM | HEART RATE: 80 BPM

## 2022-05-27 DIAGNOSIS — L21.9 SEBORRHEIC DERMATITIS: ICD-10-CM

## 2022-05-27 DIAGNOSIS — Z23 NEED FOR PROPHYLACTIC VACCINATION AGAINST DIPHTHERIA AND TETANUS: ICD-10-CM

## 2022-05-27 DIAGNOSIS — I10 ESSENTIAL HYPERTENSION: ICD-10-CM

## 2022-05-27 DIAGNOSIS — Z98.84 STATUS POST BARIATRIC SURGERY: ICD-10-CM

## 2022-05-27 DIAGNOSIS — Z00.00 MEDICARE ANNUAL WELLNESS VISIT, SUBSEQUENT: Primary | ICD-10-CM

## 2022-05-27 DIAGNOSIS — E11.9 TYPE 2 DIABETES MELLITUS WITHOUT COMPLICATION, WITHOUT LONG-TERM CURRENT USE OF INSULIN (H): ICD-10-CM

## 2022-05-27 DIAGNOSIS — Z12.31 ENCOUNTER FOR SCREENING MAMMOGRAM FOR BREAST CANCER: ICD-10-CM

## 2022-05-27 DIAGNOSIS — E03.9 ACQUIRED HYPOTHYROIDISM: ICD-10-CM

## 2022-05-27 DIAGNOSIS — R13.10 PILL DYSPHAGIA: ICD-10-CM

## 2022-05-27 LAB
ALBUMIN SERPL-MCNC: 4.8 G/DL (ref 3.5–5.7)
ALP SERPL-CCNC: 68 U/L (ref 34–104)
ALT SERPL W P-5'-P-CCNC: 41 U/L (ref 7–52)
ANION GAP SERPL CALCULATED.3IONS-SCNC: 6 MMOL/L (ref 3–14)
AST SERPL W P-5'-P-CCNC: 34 U/L (ref 13–39)
BILIRUB SERPL-MCNC: 0.4 MG/DL (ref 0.3–1)
BUN SERPL-MCNC: 12 MG/DL (ref 7–25)
CALCIUM SERPL-MCNC: 9.6 MG/DL (ref 8.6–10.3)
CHLORIDE BLD-SCNC: 101 MMOL/L (ref 98–107)
CHOLEST SERPL-MCNC: 204 MG/DL
CO2 SERPL-SCNC: 31 MMOL/L (ref 21–31)
CREAT SERPL-MCNC: 0.88 MG/DL (ref 0.6–1.2)
FASTING STATUS PATIENT QL REPORTED: NO
GFR SERPL CREATININE-BSD FRML MDRD: 72 ML/MIN/1.73M2
GLUCOSE BLD-MCNC: 109 MG/DL (ref 70–105)
HDLC SERPL-MCNC: 34 MG/DL (ref 23–92)
LDLC SERPL CALC-MCNC: 120 MG/DL
NONHDLC SERPL-MCNC: 170 MG/DL
POTASSIUM BLD-SCNC: 3.5 MMOL/L (ref 3.5–5.1)
PROT SERPL-MCNC: 8 G/DL (ref 6.4–8.9)
SODIUM SERPL-SCNC: 138 MMOL/L (ref 134–144)
T4 FREE SERPL-MCNC: 0.82 NG/DL (ref 0.6–1.6)
TRIGL SERPL-MCNC: 252 MG/DL
TSH SERPL DL<=0.005 MIU/L-ACNC: 0.1 MU/L (ref 0.4–4)

## 2022-05-27 PROCEDURE — 80053 COMPREHEN METABOLIC PANEL: CPT | Mod: ZL | Performed by: FAMILY MEDICINE

## 2022-05-27 PROCEDURE — G0463 HOSPITAL OUTPT CLINIC VISIT: HCPCS

## 2022-05-27 PROCEDURE — 99213 OFFICE O/P EST LOW 20 MIN: CPT | Mod: 25 | Performed by: FAMILY MEDICINE

## 2022-05-27 PROCEDURE — 36415 COLL VENOUS BLD VENIPUNCTURE: CPT | Mod: ZL | Performed by: FAMILY MEDICINE

## 2022-05-27 PROCEDURE — 80061 LIPID PANEL: CPT | Mod: ZL | Performed by: FAMILY MEDICINE

## 2022-05-27 PROCEDURE — 83036 HEMOGLOBIN GLYCOSYLATED A1C: CPT | Mod: ZL | Performed by: FAMILY MEDICINE

## 2022-05-27 PROCEDURE — 84439 ASSAY OF FREE THYROXINE: CPT | Mod: ZL | Performed by: FAMILY MEDICINE

## 2022-05-27 PROCEDURE — 84443 ASSAY THYROID STIM HORMONE: CPT | Mod: ZL | Performed by: FAMILY MEDICINE

## 2022-05-27 PROCEDURE — G0439 PPPS, SUBSEQ VISIT: HCPCS | Performed by: FAMILY MEDICINE

## 2022-05-27 ASSESSMENT — ENCOUNTER SYMPTOMS
MYALGIAS: 0
HEARTBURN: 1
DIZZINESS: 0
HEADACHES: 0
NAUSEA: 0
PALPITATIONS: 0
DIARRHEA: 0
SORE THROAT: 1
COUGH: 1
FREQUENCY: 0
DYSURIA: 0
FEVER: 0
ABDOMINAL PAIN: 0
HEMATURIA: 0
HEMATOCHEZIA: 0
ARTHRALGIAS: 1
JOINT SWELLING: 0
NERVOUS/ANXIOUS: 0
WEAKNESS: 0
PARESTHESIAS: 0
CONSTIPATION: 1
SHORTNESS OF BREATH: 0
BREAST MASS: 0
EYE PAIN: 0
CHILLS: 0

## 2022-05-27 ASSESSMENT — ACTIVITIES OF DAILY LIVING (ADL): CURRENT_FUNCTION: NO ASSISTANCE NEEDED

## 2022-05-27 ASSESSMENT — PAIN SCALES - GENERAL: PAINLEVEL: NO PAIN (0)

## 2022-05-27 NOTE — PROGRESS NOTES
"SUBJECTIVE:   Neelam Miller is a 66 year old female who presents for Preventive Visit.      Patient has been advised of split billing requirements and indicates understanding: Yes  Are you in the first 12 months of your Medicare coverage?  No    Healthy Habits:     In general, how would you rate your overall health?  Good    Frequency of exercise:  2-3 days/week    Duration of exercise:  45-60 minutes    Do you usually eat at least 4 servings of fruit and vegetables a day, include whole grains    & fiber and avoid regularly eating high fat or \"junk\" foods?  Yes    Taking medications regularly:  Yes    Medication side effects:  Other    Ability to successfully perform activities of daily living:  No assistance needed    Home Safety:  No safety concerns identified    Hearing Impairment:  Difficulty following a conversation in a noisy restaurant or crowded room    In the past 6 months, have you been bothered by leaking of urine?  No    In general, how would you rate your overall mental or emotional health?  Good      PHQ-2 Total Score: 0    Additional concerns today:  No    Do you feel safe in your environment? Yes    Have you ever done Advance Care Planning? (For example, a Health Directive, POLST, or a discussion with a medical provider or your loved ones about your wishes): Yes, advance care planning is on file.       Fall risk  Fallen 2 or more times in the past year?: No  Any fall with injury in the past year?: No    Cognitive Screening   1) Repeat 3 items (Leader, Season, Table)    2) Clock draw: NORMAL  3) 3 item recall: Recalls 2 objects   Results: NORMAL clock, 1-2 items recalled: COGNITIVE IMPAIRMENT LESS LIKELY    Mini-CogTM Copyright AUNDREA Harrell. Licensed by the author for use in [facility]; reprinted with permission ([email or web address]). All rights reserved.          Reviewed and updated as needed this visit by clinical staff   Tobacco  Allergies  Meds      Soc Hx          Reviewed and updated as " needed this visit by Provider                   Social History     Tobacco Use     Smoking status: Never Smoker     Smokeless tobacco: Never Used   Substance Use Topics     Alcohol use: Yes     Comment: rare         Alcohol Use 5/27/2022   Prescreen: >3 drinks/day or >7 drinks/week? Not Applicable           PROBLEMS TO ADD ON...  1.  Hypothyroidism  - has tried synthroid and did not tolerate this, tried x2; armour thyroid is tolerated, but not covered  2.  Cysts on her fingers - previously seen by orthopedics and had this drained - consistent with ganglion  3.  Type 2 diabetes   Lab Results   Component Value Date    A1C 6.7 11/03/2021    A1C 6.9 04/06/2021    A1C 6.8 10/02/2020    A1C 6.7 03/10/2020    A1C 6.7 12/17/2019    A1C 6.3 09/11/2019      4.  Rash on posterior scalp and right ear   5.  Pill dysphagia - especially when taking medication at bedtime - will need to stay ; last egd about 4 years ago  Occasionally occurs with food   6.  Hypertension   7.  Dry cough  - when she eats something that she feels  - she is on ace inhibitor   8.  Obesity/wt management - previous lapband    Current providers sharing in care for this patient include:   Patient Care Team:  Flori Carney MD as PCP - General  Flori Carney MD as Assigned PCP    The following health maintenance items are reviewed in Epic and correct as of today:  Health Maintenance Due   Topic Date Due     MEDICARE ANNUAL WELLNESS VISIT  03/10/2021     ASTHMA ACTION PLAN  03/10/2021     BMP  04/06/2022     LIPID  04/06/2022     A1C  05/03/2022     ASTHMA CONTROL TEST  05/03/2022     Pneumococcal Vaccine: 65+ Years (3 - PPSV23 or PCV20) 12/15/2021     Lab work is in process  Pneumonia Vaccine:due 2023  Mammogram Screening: due next month      FHS-7: No flowsheet data found.    Mamm annual  Pertinent mammograms are reviewed under the imaging tab.    Review of Systems  Glasses  Dentist up to date  Constipation - episodic, senokot  "chewables prn vs tea     OBJECTIVE:   /80   Pulse 80   Temp 98.2  F (36.8  C) (Tympanic)   Resp 18   Ht 1.727 m (5' 8\")   Wt 107.8 kg (237 lb 9.6 oz)   LMP  (LMP Unknown)   SpO2 97%   Breastfeeding No   BMI 36.13 kg/m   Estimated body mass index is 36.13 kg/m  as calculated from the following:    Height as of this encounter: 1.727 m (5' 8\").    Weight as of this encounter: 107.8 kg (237 lb 9.6 oz).  Physical Exam  GENERAL: healthy, alert and no distress  NECK: no adenopathy  Derm:  seb derm of posterior scalp   RESP: lungs clear to auscultation - no rales, rhonchi or wheezes  CV: regular rate and rhythm, normal S1 S2, no S3 or S4, no murmur, click or rub, no peripheral edema and peripheral pulses strong  ABDOMEN: soft, nontender, no hepatosplenomegaly, no masses and bowel sounds normal  MS: cysts on index fingers        ASSESSMENT / PLAN:       ICD-10-CM    1. Medicare annual wellness visit, subsequent  Z00.00    2. Essential hypertension  I10    3. Type 2 diabetes mellitus without complication, without long-term current use of insulin (H)  E11.9 Hemoglobin A1c     Comprehensive Metabolic Panel     Lipid Profile   4. Acquired hypothyroidism  E03.9 TSH     Comprehensive Metabolic Panel   5. Status post bariatric surgery  Z98.84 Adult Gastro Ref - Procedure Only   6. Seborrheic dermatitis  L21.9    7. Pill dysphagia  R13.10 Adult Gastro Ref - Procedure Only   8. Need for prophylactic vaccination against diphtheria and tetanus  Z23 TDAP VACCINE (Adacel, Boostrix)  [[id number]]   9. Encounter for screening mammogram for breast cancer  Z12.31 MA Screen Bilateral w/Awais     1.  Mammogram ordered  2.  Boostrix updated  3.  Referral for EGD due to pill dysphagia  4.  seb derm - discussed over the counter selenium, antifungal products  5.  Labs due today - A1C, TSH, comp lipids  - continue same medications pending results  6.  Patient with increased ASCVD risk related to hypertension, lipids, diabetes     Patient " "has been advised of split billing requirements and indicates understanding: Yes    COUNSELING:  Reviewed preventive health counseling, as reflected in patient instructions       Regular exercise       Healthy diet/nutrition       Dental care       Fall risk prevention       The 10-year ASCVD risk score (Sunitha MCCALL Jr., et al., 2013) is: 22.2%    Values used to calculate the score:      Age: 66 years      Sex: Female      Is Non- : No      Diabetic: Yes      Tobacco smoker: No      Systolic Blood Pressure: 136 mmHg      Is BP treated: Yes      HDL Cholesterol: 34 mg/dL      Total Cholesterol: 204 mg/dL    Estimated body mass index is 36.13 kg/m  as calculated from the following:    Height as of this encounter: 1.727 m (5' 8\").    Weight as of this encounter: 107.8 kg (237 lb 9.6 oz).    Weight management plan: Discussed healthy diet and exercise guidelines    She reports that she has never smoked. She has never used smokeless tobacco.      Appropriate preventive services were discussed with this patient, including applicable screening as appropriate for cardiovascular disease, diabetes, osteopenia/osteoporosis, and glaucoma.  As appropriate for age/gender, discussed screening for colorectal cancer, prostate cancer, breast cancer, and cervical cancer. Checklist reviewing preventive services available has been given to the patient.    Reviewed patients plan of care and provided an AVS. The Intermediate Care Plan ( asthma action plan, low back pain action plan, and migraine action plan) for Neelam meets the Care Plan requirement. This Care Plan has been established and reviewed with the Patient.    Counseling Resources:  ATP IV Guidelines  Pooled Cohorts Equation Calculator  Breast Cancer Risk Calculator  Breast Cancer: Medication to Reduce Risk  FRAX Risk Assessment  ICSI Preventive Guidelines  Dietary Guidelines for Americans, 2010  USDA's MyPlate  ASA Prophylaxis  Lung CA Screening    KANDI DEE" SEAN MASTERS MD  [facility]    Identified Health Risks:

## 2022-05-27 NOTE — NURSING NOTE
"Chief Complaint   Patient presents with     Medicare Visit     Patient is here for Medicare exam     Initial /80   Pulse 80   Temp 98.2  F (36.8  C) (Tympanic)   Resp 18   Ht 1.727 m (5' 8\")   Wt 107.8 kg (237 lb 9.6 oz)   LMP  (LMP Unknown)   SpO2 97%   Breastfeeding No   BMI 36.13 kg/m   Estimated body mass index is 36.13 kg/m  as calculated from the following:    Height as of this encounter: 1.727 m (5' 8\").    Weight as of this encounter: 107.8 kg (237 lb 9.6 oz).  Medication Reconciliation: complete    Aislinn Madrid MA       FOOD SECURITY SCREENING QUESTIONS:    The next two questions are to help us understand your food security.  If you are feeling you need any assistance in this area, we have resources available to support you today.    Hunger Vital Signs:  Within the past 12 months we worried whether our food would run out before we got money to buy more. Never  Within the past 12 months the food we bought just didn't last and we didn't have money to get more. Never  Aislinn Madrid MA,LPN on 5/27/2022 at 1:41 PM      "

## 2022-05-28 LAB — HBA1C MFR BLD: 7.1 % (ref 0–5.6)

## 2022-06-06 ENCOUNTER — MYC MEDICAL ADVICE (OUTPATIENT)
Dept: FAMILY MEDICINE | Facility: OTHER | Age: 67
End: 2022-06-06
Payer: COMMERCIAL

## 2022-06-07 NOTE — TELEPHONE ENCOUNTER
Referral to GI was placed 5/27/2022. Please send referral to Dr. Meeks's office. Let me know when it is sent so I can inform patient.     Aislinn Madrid, NOEL on 6/7/2022 at 11:22 AM

## 2022-06-12 DIAGNOSIS — E78.5 HYPERLIPIDEMIA, UNSPECIFIED HYPERLIPIDEMIA TYPE: ICD-10-CM

## 2022-06-13 RX ORDER — SIMVASTATIN 20 MG
TABLET ORAL
Qty: 90 TABLET | Refills: 3 | Status: SHIPPED | OUTPATIENT
Start: 2022-06-13 | End: 2023-07-12

## 2022-06-13 NOTE — TELEPHONE ENCOUNTER
Routing refill request to provider for review/approval because:    LOV: 5/27/22  Minal Tay RN on 6/13/2022 at 3:17 PM

## 2022-06-27 ENCOUNTER — LAB (OUTPATIENT)
Dept: LAB | Facility: OTHER | Age: 67
End: 2022-06-27
Attending: FAMILY MEDICINE
Payer: COMMERCIAL

## 2022-06-27 DIAGNOSIS — E03.9 ACQUIRED HYPOTHYROIDISM: ICD-10-CM

## 2022-06-27 LAB
T4 FREE SERPL-MCNC: 0.75 NG/DL (ref 0.6–1.6)
TSH SERPL DL<=0.005 MIU/L-ACNC: 0.13 MU/L (ref 0.4–4)

## 2022-06-27 PROCEDURE — 36415 COLL VENOUS BLD VENIPUNCTURE: CPT | Mod: ZL

## 2022-06-27 PROCEDURE — 84439 ASSAY OF FREE THYROXINE: CPT | Mod: ZL

## 2022-06-27 PROCEDURE — 84443 ASSAY THYROID STIM HORMONE: CPT | Mod: ZL

## 2022-06-29 ENCOUNTER — HOSPITAL ENCOUNTER (OUTPATIENT)
Dept: MAMMOGRAPHY | Facility: OTHER | Age: 67
Discharge: HOME | End: 2022-06-29
Attending: FAMILY MEDICINE | Admitting: FAMILY MEDICINE
Payer: COMMERCIAL

## 2022-06-29 DIAGNOSIS — Z12.31 ENCOUNTER FOR SCREENING MAMMOGRAM FOR BREAST CANCER: ICD-10-CM

## 2022-06-29 PROCEDURE — 77067 SCR MAMMO BI INCL CAD: CPT

## 2022-08-03 ENCOUNTER — OFFICE VISIT (OUTPATIENT)
Dept: FAMILY MEDICINE | Facility: OTHER | Age: 67
End: 2022-08-03
Attending: FAMILY MEDICINE
Payer: COMMERCIAL

## 2022-08-03 VITALS
DIASTOLIC BLOOD PRESSURE: 86 MMHG | HEART RATE: 72 BPM | RESPIRATION RATE: 18 BRPM | TEMPERATURE: 97.7 F | WEIGHT: 237.6 LBS | OXYGEN SATURATION: 97 % | BODY MASS INDEX: 36.13 KG/M2 | SYSTOLIC BLOOD PRESSURE: 134 MMHG

## 2022-08-03 DIAGNOSIS — I10 ESSENTIAL HYPERTENSION: ICD-10-CM

## 2022-08-03 DIAGNOSIS — E11.9 TYPE 2 DIABETES MELLITUS WITHOUT COMPLICATION, WITHOUT LONG-TERM CURRENT USE OF INSULIN (H): ICD-10-CM

## 2022-08-03 DIAGNOSIS — K21.00 GASTROESOPHAGEAL REFLUX DISEASE WITH ESOPHAGITIS WITHOUT HEMORRHAGE: Primary | ICD-10-CM

## 2022-08-03 DIAGNOSIS — E66.01 MORBID OBESITY (H): ICD-10-CM

## 2022-08-03 DIAGNOSIS — E03.9 ACQUIRED HYPOTHYROIDISM: ICD-10-CM

## 2022-08-03 PROCEDURE — G0463 HOSPITAL OUTPT CLINIC VISIT: HCPCS

## 2022-08-03 PROCEDURE — 99214 OFFICE O/P EST MOD 30 MIN: CPT | Performed by: FAMILY MEDICINE

## 2022-08-03 RX ORDER — THYROID 60 MG/1
180 TABLET ORAL DAILY
Qty: 180 TABLET | Refills: 3 | COMMUNITY
Start: 2022-08-03 | End: 2022-10-05

## 2022-08-03 RX ORDER — THYROID 60 MG/1
120 TABLET ORAL DAILY
Qty: 180 TABLET | Refills: 3 | Status: SHIPPED | OUTPATIENT
Start: 2022-08-03 | End: 2022-08-03

## 2022-08-03 RX ORDER — THYROID 30 MG/1
60 TABLET ORAL DAILY
Qty: 180 TABLET | Refills: 3 | Status: SHIPPED | OUTPATIENT
Start: 2022-08-03 | End: 2022-08-03

## 2022-08-03 RX ORDER — LISINOPRIL 10 MG/1
10 TABLET ORAL DAILY
Qty: 90 TABLET | Refills: 3 | Status: CANCELLED | OUTPATIENT
Start: 2022-08-03

## 2022-08-03 RX ORDER — LOSARTAN POTASSIUM 25 MG/1
25 TABLET ORAL DAILY
Qty: 90 TABLET | Refills: 3 | Status: SHIPPED | OUTPATIENT
Start: 2022-08-03 | End: 2022-09-29

## 2022-08-03 RX ORDER — HYDROCHLOROTHIAZIDE 25 MG/1
25 TABLET ORAL DAILY
Qty: 90 TABLET | Refills: 3 | Status: SHIPPED | OUTPATIENT
Start: 2022-08-03 | End: 2023-07-28

## 2022-08-03 ASSESSMENT — PATIENT HEALTH QUESTIONNAIRE - PHQ9
SUM OF ALL RESPONSES TO PHQ QUESTIONS 1-9: 2
10. IF YOU CHECKED OFF ANY PROBLEMS, HOW DIFFICULT HAVE THESE PROBLEMS MADE IT FOR YOU TO DO YOUR WORK, TAKE CARE OF THINGS AT HOME, OR GET ALONG WITH OTHER PEOPLE: NOT DIFFICULT AT ALL
SUM OF ALL RESPONSES TO PHQ QUESTIONS 1-9: 2

## 2022-08-03 ASSESSMENT — PAIN SCALES - GENERAL: PAINLEVEL: NO PAIN (0)

## 2022-08-03 NOTE — PROGRESS NOTES
Assessment & Plan       ICD-10-CM    1. Gastroesophageal reflux disease with esophagitis without hemorrhage  K21.00 omeprazole (PRILOSEC) 20 MG DR capsule   2. Essential hypertension  I10 losartan (COZAAR) 25 MG tablet   3. Type 2 diabetes mellitus without complication, without long-term current use of insulin (H)  E11.9 hydrochlorothiazide (HYDRODIURIL) 25 MG tablet   4. Acquired hypothyroidism  E03.9 thyroid (ARMOUR) 60 MG tablet     DISCONTINUED: thyroid (ARMOUR) 30 MG tablet     DISCONTINUED: thyroid (ARMOUR) 60 MG tablet     1. Discussed and reviewed EGD results.  Based on this will change lisinopril to cozaar due to possible ace inhibitor cough.  Will have her take omeprazole twice a week - this may be enough to decreased GERD symptoms while it may decrease long term PPI risks/complications.  2.  Check BP at home due to medication changes  3.  Prescription for armour thyroid updated.  4.  Obesity contributes to and complicates treatment f diabetes and hypertension   Follow up after appointment with naturopath.      Review of external notes as documented elsewhere in note  Prescription drug management             Return in about 3 months (around 11/3/2022) for Follow up.    KANDI MASTERS MD  [facility]    Subjective   Neelam is a 67 year old, presenting for the following health issues:  Recheck Medication and Surgical Followup (Endoscopy )  Neelam Miller is a 67 year old female is in for follow up after EGD.  She brings in her EGD operative report from Dr Meeks.  No ulcers.  Gastritis/esophagitis noted.    She's had a cough, chronic  She took one omeprazole last week - this seemed to help her symptoms for about 5 days.     History of Present Illness       Reason for visit:  Meds after endoscopy    She eats 2-3 servings of fruits and vegetables daily.She consumes 1 sweetened beverage(s) daily.She exercises with enough effort to increase her heart rate 20 to 29 minutes per  day.  She exercises with enough effort to increase her heart rate 4 days per week.   She is taking medications regularly.    Today's PHQ-9         PHQ-9 Total Score: 2    PHQ-9 Q9 Thoughts of better off dead/self-harm past 2 weeks :   Not at all    How difficult have these problems made it for you to do your work, take care of things at home, or get along with other people: Not difficult at all         Current Outpatient Medications   Medication     albuterol (PROAIR HFA/PROVENTIL HFA/VENTOLIN HFA) 108 (90 Base) MCG/ACT inhaler     Ascorbic Acid 1000 MG TABS     aspirin 81 MG tablet     B Complex Vitamins (VITAMIN-B COMPLEX) TABS     blood glucose (NO BRAND SPECIFIED) lancets standard     blood glucose monitoring (ONE TOUCH ULTRA 2) meter device kit     Calcium Carbonate Antacid (TUMS PO)     Calcium-Vitamin D-Vitamin K 500-100-40 MG-UNT-MCG CHEW     ERGOCALCIFEROL     hydrochlorothiazide (HYDRODIURIL) 25 MG tablet     Lancets (ONETOUCH DELICA PLUS [id number]) MISC     losartan (COZAAR) 25 MG tablet     Magnesium 400 MG CAPS     Omega-3 Fatty Acids (OMEGA-3 EPA FISH OIL PO)     omeprazole (PRILOSEC) 20 MG DR capsule     simvastatin (ZOCOR) 20 MG tablet     thyroid (ARMOUR) 60 MG tablet     zinc gluconate 30 MG TABS     Current Facility-Administered Medications   Medication     ipratropium - albuterol 0.5 mg/2.5 mg/3 mL (DUONEB) neb solution 3 mL     Past Medical History:   Diagnosis Date     Allergic rhinitis     No Comments Provided     Essential (primary) hypertension     No Comments Provided     Hormone replacement therapy (postmenopausal)     5/04, discontinued     Hyperlipidemia     No Comments Provided     Hypothyroidism     No Comments Provided     Major depressive disorder, single episode     03/2012     Obesity     No Comments Provided     Sleep apnea     wears cpap prn     Type 2 diabetes mellitus without complication, without long-term current use of insulin (H) 3/5/2018         Review of Systems   History  of lap band - this is open  Seeing naturopath end of this week      Objective    /86   Pulse 72   Temp 97.7  F (36.5  C) (Tympanic)   Resp 18   Wt 107.8 kg (237 lb 9.6 oz)   LMP  (LMP Unknown)   SpO2 97%   Breastfeeding No   BMI 36.13 kg/m    Body mass index is 36.13 kg/m .  Physical Exam   GENERAL: healthy, alert and no distress                    .  ..

## 2022-08-03 NOTE — PATIENT INSTRUCTIONS
Monitor your blood pressure with the change in prescription - losartan    Omeprazole twice a week

## 2022-08-03 NOTE — NURSING NOTE
"Chief Complaint   Patient presents with     Recheck Medication     Surgical Followup     Endoscopy      Patient is here for medication recheck and follow up from endoscopy     Initial /86   Pulse 72   Temp 97.7  F (36.5  C) (Tympanic)   Resp 18   Wt 107.8 kg (237 lb 9.6 oz)   LMP  (LMP Unknown)   SpO2 97%   Breastfeeding No   BMI 36.13 kg/m   Estimated body mass index is 36.13 kg/m  as calculated from the following:    Height as of 5/27/22: 1.727 m (5' 8\").    Weight as of this encounter: 107.8 kg (237 lb 9.6 oz).  Medication Reconciliation: complete    Aislinn Madrid MA       FOOD SECURITY SCREENING QUESTIONS:    The next two questions are to help us understand your food security.  If you are feeling you need any assistance in this area, we have resources available to support you today.    Hunger Vital Signs:  Within the past 12 months we worried whether our food would run out before we got money to buy more. Never  Within the past 12 months the food we bought just didn't last and we didn't have money to get more. Never  Aislinn Madrid MA,LPN on 8/3/2022 at 3:43 PM      "

## 2022-08-11 DIAGNOSIS — B37.9 YEAST INFECTION: ICD-10-CM

## 2022-08-11 RX ORDER — FLUCONAZOLE 100 MG/1
100 TABLET ORAL PRN
Qty: 90 TABLET | Refills: 2 | Status: SHIPPED | OUTPATIENT
Start: 2022-08-11 | End: 2023-08-22

## 2022-08-11 NOTE — TELEPHONE ENCOUNTER
[facility] sent Rx request for the following:      FLUCONAZOLE 100 MG TABLET      Last Prescription Date:   11/3/2021  Last Fill Qty/Refills:         90, R-2    Last Office Visit:              8/3/2022   Future Office visit:           none    Routing refill request to provider for review/approval because:  Drug not active on patient's medication list    Nathan Macdonald RN, BSN  ....................  8/11/2022   3:24 PM

## 2022-09-28 ENCOUNTER — MYC MEDICAL ADVICE (OUTPATIENT)
Dept: FAMILY MEDICINE | Facility: OTHER | Age: 67
End: 2022-09-28
Payer: COMMERCIAL

## 2022-09-28 DIAGNOSIS — I10 ESSENTIAL HYPERTENSION: ICD-10-CM

## 2022-09-28 PROCEDURE — 99421 OL DIG E/M SVC 5-10 MIN: CPT | Performed by: FAMILY MEDICINE

## 2022-09-29 RX ORDER — LISINOPRIL 10 MG/1
10 TABLET ORAL DAILY
Qty: 90 TABLET | Refills: 3 | Status: SHIPPED | OUTPATIENT
Start: 2022-09-29 | End: 2022-10-18

## 2022-09-29 NOTE — PATIENT INSTRUCTIONS
Thank you for choosing us for your care. I have placed an order for a prescription so that you can start treatment. View your full visit summary for details by clicking on the link below. Your pharmacist will able to address any questions you may have about the medication.     If you're not feeling better within 5-7 days, please schedule an appointment.  You can schedule an appointment right here in [facility], or call [phone]  If the visit is for the same symptoms as your eVisit, we'll refund the cost of your eVisit if seen within seven days.

## 2022-10-05 ENCOUNTER — MYC MEDICAL ADVICE (OUTPATIENT)
Dept: FAMILY MEDICINE | Facility: OTHER | Age: 67
End: 2022-10-05

## 2022-10-05 DIAGNOSIS — E03.9 ACQUIRED HYPOTHYROIDISM: ICD-10-CM

## 2022-10-05 RX ORDER — THYROID 60 MG/1
180 TABLET ORAL DAILY
Qty: 270 TABLET | Refills: 3 | Status: SHIPPED | OUTPATIENT
Start: 2022-10-05 | End: 2023-08-22

## 2022-10-05 NOTE — TELEPHONE ENCOUNTER
Pending Prescriptions:                       Disp   Refills    thyroid (ARMOUR) 60 MG tablet             270 ta*3            Sig: Take 3 tablets (180 mg) by mouth daily

## 2022-10-12 ENCOUNTER — TRANSFERRED RECORDS (OUTPATIENT)
Dept: HEALTH INFORMATION MANAGEMENT | Facility: OTHER | Age: 67
End: 2022-10-12

## 2022-10-18 ENCOUNTER — HOSPITAL ENCOUNTER (OUTPATIENT)
Dept: GENERAL RADIOLOGY | Facility: OTHER | Age: 67
Discharge: HOME | End: 2022-10-18
Attending: FAMILY MEDICINE
Payer: COMMERCIAL

## 2022-10-18 ENCOUNTER — OFFICE VISIT (OUTPATIENT)
Dept: FAMILY MEDICINE | Facility: OTHER | Age: 67
End: 2022-10-18
Attending: FAMILY MEDICINE
Payer: COMMERCIAL

## 2022-10-18 ENCOUNTER — TELEPHONE (OUTPATIENT)
Dept: FAMILY MEDICINE | Facility: OTHER | Age: 67
End: 2022-10-18

## 2022-10-18 DIAGNOSIS — R07.9 CHEST PAIN, UNSPECIFIED TYPE: ICD-10-CM

## 2022-10-18 DIAGNOSIS — R07.9 CHEST PAIN, UNSPECIFIED TYPE: Primary | ICD-10-CM

## 2022-10-18 DIAGNOSIS — I10 ESSENTIAL HYPERTENSION: ICD-10-CM

## 2022-10-18 PROCEDURE — G0463 HOSPITAL OUTPT CLINIC VISIT: HCPCS | Mod: 25 | Performed by: FAMILY MEDICINE

## 2022-10-18 PROCEDURE — 99213 OFFICE O/P EST LOW 20 MIN: CPT | Performed by: FAMILY MEDICINE

## 2022-10-18 PROCEDURE — 71101 X-RAY EXAM UNILAT RIBS/CHEST: CPT | Mod: LT

## 2022-10-18 RX ORDER — TRAMADOL HYDROCHLORIDE 50 MG/1
50 TABLET ORAL EVERY 6 HOURS PRN
Qty: 12 TABLET | Refills: 0 | Status: SHIPPED | OUTPATIENT
Start: 2022-10-18 | End: 2022-10-21

## 2022-10-18 RX ORDER — LISINOPRIL 20 MG/1
20 TABLET ORAL DAILY
Qty: 90 TABLET | Refills: 3 | Status: SHIPPED | OUTPATIENT
Start: 2022-10-18 | End: 2023-08-22

## 2022-10-18 ASSESSMENT — PAIN SCALES - GENERAL: PAINLEVEL: EXTREME PAIN (8)

## 2022-10-18 NOTE — NURSING NOTE
Patient here for left chest pain  She hurt her rib after a fall on Sunday morning. She can pin point the area of concern. Medication Reconciliation: complete.    Kayla Harris LPN  10/18/2022 8:50 AM

## 2022-10-19 VITALS
TEMPERATURE: 96 F | DIASTOLIC BLOOD PRESSURE: 88 MMHG | HEART RATE: 64 BPM | SYSTOLIC BLOOD PRESSURE: 144 MMHG | RESPIRATION RATE: 18 BRPM | WEIGHT: 226.8 LBS | OXYGEN SATURATION: 97 % | BODY MASS INDEX: 34.48 KG/M2

## 2022-10-19 NOTE — PROGRESS NOTES
Assessment & Plan     Chest pain, unspecified type likely contusion.  Pain control.  Follow-up with.    - XR Ribs & Chest Lt 3v; Future  - traMADol (ULTRAM) 50 MG tablet; Take 1 tablet (50 mg) by mouth every 6 hours as needed for severe pain    Essential hypertension  Blood pressure currently not under good control increase lisinopril to 20.  Follow-up with your PCP in 1 month  - lisinopril (ZESTRIL) 20 MG tablet; Take 1 tablet (20 mg) by mouth daily                 No follow-ups on file.    David Benedict MD  [facility]    Subjective   Neelam is a 67 year old, presenting for the following health issues:  Pain (Left chest pain after a fall )      Patient arrives here for chest pain.  She states that she had taken a fall on Saturday.  Was taking care of her partner when she slipped and fell on the concrete.  Landed on her left head.  There was no loss of consciousness.  Currently no headache.  Also her left side    Pain    History of Present Illness       Reason for visit:  Pain chest (rib cage)  Symptom onset:  1-3 days ago  Symptoms include:  Paim in chest(ribs)  Symptom intensity:  Moderate  Symptom progression:  Staying the same  Had these symptoms before:  No  What makes it worse:  Coughing; moving left arm  What makes it better:  Not moving    She eats 2-3 servings of fruits and vegetables daily.She consumes 0 sweetened beverage(s) daily.She exercises with enough effort to increase her heart rate 20 to 29 minutes per day.  She exercises with enough effort to increase her heart rate 3 or less days per week.   She is taking medications regularly.             Review of Systems       Objective    BP (!) 144/88 (BP Location: Right arm)   Pulse 64   Temp (!) 96  F (35.6  C)   Resp 18   Wt 102.9 kg (226 lb 12.8 oz)   LMP  (LMP Unknown)   SpO2 97%   BMI 34.48 kg/m    Body mass index is 34.48 kg/m .  Physical Exam  Constitutional:       Appearance: Normal appearance.   HENT:      Head:  Normocephalic.      Mouth/Throat:      Mouth: Mucous membranes are moist.   Eyes:      Pupils: Pupils are equal, round, and reactive to light.   Cardiovascular:      Rate and Rhythm: Normal rate.   Pulmonary:      Effort: Pulmonary effort is normal.      Breath sounds: Normal breath sounds.      Comments: Patient does have pain on palpation on the left anterior chest wall no bruising.  Neurological:      Mental Status: She is alert.   Psychiatric:         Mood and Affect: Mood normal.         Thought Content: Thought content normal.            CXR - Reviewed and interpreted by me Normal- no infiltrates, effusions, pneumothoraces, cardiomegaly or masses no rib fractures

## 2022-11-10 ENCOUNTER — TRANSFERRED RECORDS (OUTPATIENT)
Dept: HEALTH INFORMATION MANAGEMENT | Facility: OTHER | Age: 67
End: 2022-11-10

## 2022-11-10 LAB — RETINOPATHY: NEGATIVE

## 2022-11-29 DIAGNOSIS — E11.9 TYPE 2 DIABETES MELLITUS WITHOUT COMPLICATION, WITHOUT LONG-TERM CURRENT USE OF INSULIN (H): Primary | ICD-10-CM

## 2022-12-06 ENCOUNTER — LAB (OUTPATIENT)
Dept: LAB | Facility: OTHER | Age: 67
End: 2022-12-06
Attending: EMERGENCY MEDICINE
Payer: COMMERCIAL

## 2022-12-06 DIAGNOSIS — E11.9 TYPE 2 DIABETES MELLITUS WITHOUT COMPLICATION, WITHOUT LONG-TERM CURRENT USE OF INSULIN (H): ICD-10-CM

## 2022-12-06 DIAGNOSIS — E34.9 ENDOCRINE PROBLEM: ICD-10-CM

## 2022-12-06 DIAGNOSIS — I10 ESSENTIAL HYPERTENSION: ICD-10-CM

## 2022-12-06 DIAGNOSIS — E03.9 HYPOTHYROIDISM, ADULT: ICD-10-CM

## 2022-12-06 DIAGNOSIS — E55.9 VITAMIN D DEFICIENCY: ICD-10-CM

## 2022-12-06 DIAGNOSIS — R53.83 FATIGUE: Primary | ICD-10-CM

## 2022-12-06 LAB
ALBUMIN SERPL BCG-MCNC: 4.5 G/DL (ref 3.5–5.2)
ALP SERPL-CCNC: 92 U/L (ref 35–104)
ALT SERPL W P-5'-P-CCNC: 37 U/L (ref 10–35)
ANION GAP SERPL CALCULATED.3IONS-SCNC: 8 MMOL/L (ref 7–15)
AST SERPL W P-5'-P-CCNC: 30 U/L (ref 10–35)
BILIRUB SERPL-MCNC: 0.7 MG/DL
BUN SERPL-MCNC: 13.8 MG/DL (ref 8–23)
CALCIUM SERPL-MCNC: 9.5 MG/DL (ref 8.8–10.2)
CHLORIDE SERPL-SCNC: 101 MMOL/L (ref 98–107)
CREAT SERPL-MCNC: 0.71 MG/DL (ref 0.51–0.95)
DEPRECATED HCO3 PLAS-SCNC: 31 MMOL/L (ref 22–29)
FERRITIN SERPL-MCNC: 75 NG/ML (ref 11–328)
GFR SERPL CREATININE-BSD FRML MDRD: >90 ML/MIN/1.73M2
GLUCOSE SERPL-MCNC: 124 MG/DL (ref 70–99)
HOLD SPECIMEN: NORMAL
POTASSIUM SERPL-SCNC: 3.8 MMOL/L (ref 3.4–5.3)
PROT SERPL-MCNC: 7.9 G/DL (ref 6.4–8.3)
SODIUM SERPL-SCNC: 140 MMOL/L (ref 136–145)
T4 FREE SERPL-MCNC: 0.96 NG/DL (ref 0.9–1.7)
TSH SERPL DL<=0.005 MIU/L-ACNC: 0.18 UIU/ML (ref 0.3–4.2)

## 2022-12-06 PROCEDURE — 84443 ASSAY THYROID STIM HORMONE: CPT | Mod: ZL

## 2022-12-06 PROCEDURE — 80053 COMPREHEN METABOLIC PANEL: CPT | Mod: ZL

## 2022-12-06 PROCEDURE — 82627 DEHYDROEPIANDROSTERONE: CPT | Mod: ZL

## 2022-12-06 PROCEDURE — 36415 COLL VENOUS BLD VENIPUNCTURE: CPT | Mod: ZL

## 2022-12-06 PROCEDURE — 84439 ASSAY OF FREE THYROXINE: CPT | Mod: ZL

## 2022-12-06 PROCEDURE — 84481 FREE ASSAY (FT-3): CPT | Mod: ZL

## 2022-12-06 PROCEDURE — 82728 ASSAY OF FERRITIN: CPT | Mod: ZL

## 2022-12-07 LAB
DHEA-S SERPL-MCNC: 26 UG/DL (ref 35–430)
T3FREE SERPL-MCNC: 2.9 PG/ML (ref 2–4.4)

## 2022-12-12 ENCOUNTER — OFFICE VISIT (OUTPATIENT)
Dept: FAMILY MEDICINE | Facility: OTHER | Age: 67
End: 2022-12-12
Attending: FAMILY MEDICINE
Payer: COMMERCIAL

## 2022-12-12 VITALS
TEMPERATURE: 97 F | OXYGEN SATURATION: 97 % | RESPIRATION RATE: 16 BRPM | DIASTOLIC BLOOD PRESSURE: 82 MMHG | WEIGHT: 225.6 LBS | SYSTOLIC BLOOD PRESSURE: 134 MMHG | HEART RATE: 62 BPM | BODY MASS INDEX: 34.3 KG/M2

## 2022-12-12 DIAGNOSIS — I10 ESSENTIAL HYPERTENSION: ICD-10-CM

## 2022-12-12 DIAGNOSIS — E03.9 ACQUIRED HYPOTHYROIDISM: ICD-10-CM

## 2022-12-12 DIAGNOSIS — E78.5 HYPERLIPIDEMIA, UNSPECIFIED HYPERLIPIDEMIA TYPE: ICD-10-CM

## 2022-12-12 DIAGNOSIS — E66.01 MORBID OBESITY (H): ICD-10-CM

## 2022-12-12 DIAGNOSIS — E11.9 TYPE 2 DIABETES MELLITUS WITHOUT COMPLICATION, WITHOUT LONG-TERM CURRENT USE OF INSULIN (H): Primary | ICD-10-CM

## 2022-12-12 DIAGNOSIS — J45.20 MILD INTERMITTENT ASTHMA, UNSPECIFIED WHETHER COMPLICATED: ICD-10-CM

## 2022-12-12 LAB
CHOLEST SERPL-MCNC: 144 MG/DL
CREAT UR-MCNC: 35.3 MG/DL
HBA1C MFR BLD: 6.4 % (ref 4–6.2)
HDLC SERPL-MCNC: 41 MG/DL
HOLD SPECIMEN: NORMAL
LDLC SERPL CALC-MCNC: 77 MG/DL
MICROALBUMIN UR-MCNC: <12 MG/L
MICROALBUMIN/CREAT UR: NORMAL MG/G{CREAT}
NONHDLC SERPL-MCNC: 103 MG/DL
TRIGL SERPL-MCNC: 129 MG/DL

## 2022-12-12 PROCEDURE — 83036 HEMOGLOBIN GLYCOSYLATED A1C: CPT | Mod: ZL | Performed by: FAMILY MEDICINE

## 2022-12-12 PROCEDURE — 82043 UR ALBUMIN QUANTITATIVE: CPT | Mod: ZL | Performed by: FAMILY MEDICINE

## 2022-12-12 PROCEDURE — 36415 COLL VENOUS BLD VENIPUNCTURE: CPT | Mod: ZL | Performed by: FAMILY MEDICINE

## 2022-12-12 PROCEDURE — G0463 HOSPITAL OUTPT CLINIC VISIT: HCPCS

## 2022-12-12 PROCEDURE — 99214 OFFICE O/P EST MOD 30 MIN: CPT | Performed by: FAMILY MEDICINE

## 2022-12-12 PROCEDURE — 80061 LIPID PANEL: CPT | Mod: ZL | Performed by: FAMILY MEDICINE

## 2022-12-12 PROCEDURE — 36415 COLL VENOUS BLD VENIPUNCTURE: CPT | Performed by: FAMILY MEDICINE

## 2022-12-12 RX ORDER — ALBUTEROL SULFATE 90 UG/1
2 AEROSOL, METERED RESPIRATORY (INHALATION) EVERY 6 HOURS
Qty: 8.5 G | Refills: 3 | Status: SHIPPED | OUTPATIENT
Start: 2022-12-12

## 2022-12-12 RX ORDER — MELATONIN 3 MG
TABLET ORAL
COMMUNITY
Start: 2022-12-12

## 2022-12-12 ASSESSMENT — ASTHMA QUESTIONNAIRES
QUESTION_5 LAST FOUR WEEKS HOW WOULD YOU RATE YOUR ASTHMA CONTROL: COMPLETELY CONTROLLED
QUESTION_1 LAST FOUR WEEKS HOW MUCH OF THE TIME DID YOUR ASTHMA KEEP YOU FROM GETTING AS MUCH DONE AT WORK, SCHOOL OR AT HOME: NONE OF THE TIME
QUESTION_2 LAST FOUR WEEKS HOW OFTEN HAVE YOU HAD SHORTNESS OF BREATH: NOT AT ALL
ACT_TOTALSCORE: 25
QUESTION_4 LAST FOUR WEEKS HOW OFTEN HAVE YOU USED YOUR RESCUE INHALER OR NEBULIZER MEDICATION (SUCH AS ALBUTEROL): NOT AT ALL
QUESTION_3 LAST FOUR WEEKS HOW OFTEN DID YOUR ASTHMA SYMPTOMS (WHEEZING, COUGHING, SHORTNESS OF BREATH, CHEST TIGHTNESS OR PAIN) WAKE YOU UP AT NIGHT OR EARLIER THAN USUAL IN THE MORNING: NOT AT ALL
ACT_TOTALSCORE: 25

## 2022-12-12 ASSESSMENT — PAIN SCALES - GENERAL: PAINLEVEL: NO PAIN (0)

## 2022-12-12 NOTE — NURSING NOTE
"Chief Complaint   Patient presents with     Recheck Medication     Hypertension     Fall October- saw Dr. Benedict- is better      Patient is here for recheck on medications    Initial /82   Pulse 62   Temp 97  F (36.1  C) (Tympanic)   Resp 16   Wt 102.3 kg (225 lb 9.6 oz)   LMP  (LMP Unknown)   SpO2 97%   Breastfeeding No   BMI 34.30 kg/m   Estimated body mass index is 34.3 kg/m  as calculated from the following:    Height as of 5/27/22: 1.727 m (5' 8\").    Weight as of this encounter: 102.3 kg (225 lb 9.6 oz).  Medication Reconciliation: complete    Aislinn Madrid CMA       FOOD SECURITY SCREENING QUESTIONS:    The next two questions are to help us understand your food security.  If you are feeling you need any assistance in this area, we have resources available to support you today.    Hunger Vital Signs:  Within the past 12 months we worried whether our food would run out before we got money to buy more. Never  Within the past 12 months the food we bought just didn't last and we didn't have money to get more. Never  Aislinn Madrid CMA,LPN on 12/12/2022 at 11:43 AM      "

## 2022-12-12 NOTE — PROGRESS NOTES
Previous A1C is at goal of <8  Lab Results   Component Value Date    A1C 7.1 05/27/2022    A1C 6.7 11/03/2021    A1C 6.9 04/06/2021    A1C 6.8 10/02/2020    A1C 6.7 03/10/2020    A1C 6.7 12/17/2019    A1C 6.3 09/11/2019     Urine microalbumin:creatine: N/A  Foot exam DUE  Eye exam 11/10/2022    Tobacco User NO  Patient is not on a daily aspirin  Patient is on a Statin.  Blood pressure today of:     BP Readings from Last 1 Encounters:   12/12/22 134/82      is at the goal of <139/89 for diabetics.    Aislinn Madrid CMA on 12/12/2022 at 11:45 AM

## 2022-12-12 NOTE — PROGRESS NOTES
"  Assessment & Plan       ICD-10-CM    1. Type 2 diabetes mellitus without complication, without long-term current use of insulin (H)  E11.9 FOOT EXAM     blood glucose (NO BRAND SPECIFIED) lancets standard     Lipid Profile     Albumin Random Urine Quantitative with Creat Ratio     Hemoglobin A1c     CANCELED: Lipid Profile     CANCELED: Albumin Random Urine Quantitative with Creat Ratio      2. Acquired hypothyroidism  E03.9       3. Essential hypertension  I10       4. Hyperlipidemia, unspecified hyperlipidemia type  E78.5       5. Morbid obesity (H)  E66.01       6. Mild intermittent asthma, unspecified whether complicated  J45.20 albuterol (PROAIR HFA/PROVENTIL HFA/VENTOLIN HFA) 108 (90 Base) MCG/ACT inhaler        1.  Follow-up blood pressure readings improved, continue same dose of lisinopril.  2.  Labs due today include A1c, urine microalbumin, and lipids.  We did review labs that she had done by outside provider last week.  3.  With reviewing those previous labs, in particular reviewed TSH findings.  4.  Patient with good weight loss over the past 6 months, discussed ongoing methods for weight management/diabetes management.  5.  Patient requests refill of albuterol inhaler.    Ordering of each unique test  Prescription drug management         BMI:   Estimated body mass index is 34.3 kg/m  as calculated from the following:    Height as of 5/27/22: 1.727 m (5' 8\").    Weight as of this encounter: 102.3 kg (225 lb 9.6 oz).   Weight management plan: Discussed healthy diet and exercise guidelines        Return in about 6 months (around 6/12/2023).    KANDI MASTERS MD  [facility]    River Richter is a 67 year old, presenting for the following health issues:  Recheck Medication, Hypertension, Fall (October- saw Dr. Benedict- is better ), and LAB REQUEST (Covid test as partner will be going to hospital in [place] )      History of Present Illness       Reason for visit:  Bp and " medication    She eats 2-3 servings of fruits and vegetables daily.She consumes 0 sweetened beverage(s) daily.She exercises with enough effort to increase her heart rate 20 to 29 minutes per day.  She exercises with enough effort to increase her heart rate 3 or less days per week.   She is taking medications regularly.     1.  Type 2 diabetes - Wt loss with going gluten free and dairy free - lost 10#.  FBS have improved.  Lab Results   Component Value Date    A1C 7.1 05/27/2022    A1C 6.7 11/03/2021    A1C 6.9 04/06/2021    A1C 6.8 10/02/2020    A1C 6.7 03/10/2020    A1C 6.7 12/17/2019    A1C 6.3 09/11/2019       Wt Readings from Last 4 Encounters:   12/12/22 102.3 kg (225 lb 9.6 oz)   10/18/22 102.9 kg (226 lb 12.8 oz)   08/03/22 107.8 kg (237 lb 9.6 oz)   05/27/22 107.8 kg (237 lb 9.6 oz)     2.  Hypothyroidism  - recent TSH was low with normal freeT4    3.  She recently was into the clinic because of a fall/injury.  At that time, blood pressure was elevated.  Dose of lisinopril was increased.  She denies any side effects from this medication increase.    Current Outpatient Medications   Medication     albuterol (PROAIR HFA/PROVENTIL HFA/VENTOLIN HFA) 108 (90 Base) MCG/ACT inhaler     Ascorbic Acid 1000 MG TABS     aspirin 81 MG tablet     B Complex Vitamins (VITAMIN-B COMPLEX) TABS     blood glucose (NO BRAND SPECIFIED) lancets standard     blood glucose monitoring (ONE TOUCH ULTRA 2) meter device kit     Calcium Carbonate Antacid (TUMS PO)     Calcium-Vitamin D-Vitamin K 500-100-40 MG-UNT-MCG CHEW     Chromium 200 MCG CAPS     DHEA 10 MG TABS     ERGOCALCIFEROL     fluconazole (DIFLUCAN) 100 MG tablet     hydrochlorothiazide (HYDRODIURIL) 25 MG tablet     Lancets (ONETOUCH DELICA PLUS [id number]) MISC     lisinopril (ZESTRIL) 20 MG tablet     Magnesium 400 MG CAPS     Milk Thistle 175 MG CAPS     Omega-3 Fatty Acids (OMEGA-3 EPA FISH OIL PO)     omeprazole (PRILOSEC) 20 MG DR capsule     simvastatin (ZOCOR) 20 MG  tablet     thyroid (ARMOUR) 60 MG tablet     zinc gluconate 30 MG TABS     Current Facility-Administered Medications   Medication     ipratropium - albuterol 0.5 mg/2.5 mg/3 mL (DUONEB) neb solution 3 mL     Past Medical History:   Diagnosis Date     Allergic rhinitis     No Comments Provided     Essential (primary) hypertension     No Comments Provided     Hormone replacement therapy (postmenopausal)     5/04, discontinued     Hyperlipidemia     No Comments Provided     Hypothyroidism     No Comments Provided     Major depressive disorder, single episode     03/2012     Obesity     No Comments Provided     Sleep apnea     wears cpap prn     Type 2 diabetes mellitus without complication, without long-term current use of insulin (H) 3/5/2018   ,        Review of Systems         Objective    /82   Pulse 62   Temp 97  F (36.1  C) (Tympanic)   Resp 16   Wt 102.3 kg (225 lb 9.6 oz)   LMP  (LMP Unknown)   SpO2 97%   Breastfeeding No   BMI 34.30 kg/m    Body mass index is 34.3 kg/m .  Physical Exam   GENERAL: healthy, alert and no distress  Diabetic foot exam: normal DP and PT pulses, no trophic changes or ulcerative lesions and normal sensory exam

## 2022-12-12 NOTE — LETTER
My Asthma Action Plan    Name: Neelam Miller   YOB: 1955  Date: 12/12/2022   My doctor: KANDI MASTERS MD   My clinic: [facility]        My Rescue Medicine:   Albuterol inhaler (Proair/Ventolin/Proventil HFA)  2-4 puffs EVERY 4 HOURS as needed. Use a spacer if recommended by your provider.   My Asthma Severity:   Intermittent / Exercise Induced  Know your asthma triggers:              GREEN ZONE   Good Control    I feel good    No cough or wheeze    Can work, sleep and play without asthma symptoms       Take your asthma control medicine every day.     1. If exercise triggers your asthma, take your rescue medication    15 minutes before exercise or sports, and    During exercise if you have asthma symptoms  2. Spacer to use with inhaler: If you have a spacer, make sure to use it with your inhaler             YELLOW ZONE Getting Worse  I have ANY of these:    I do not feel good    Cough or wheeze    Chest feels tight    Wake up at night   1. Keep taking your Green Zone medications  2. Start taking your rescue medicine:    every 20 minutes for up to 1 hour. Then every 4 hours for 24-48 hours.  3. If you stay in the Yellow Zone for more than 12-24 hours, contact your doctor.  4. If you do not return to the Green Zone in 12-24 hours or you get worse, start taking your oral steroid medicine if prescribed by your provider.           RED ZONE Medical Alert - Get Help  I have ANY of these:    I feel awful    Medicine is not helping    Breathing getting harder    Trouble walking or talking    Nose opens wide to breathe       1. Take your rescue medicine NOW  2. If your provider has prescribed an oral steroid medicine, start taking it NOW  3. Call your doctor NOW  4. If you are still in the Red Zone after 20 minutes and you have not reached your doctor:    Take your rescue medicine again and    Call 911 or go to the emergency room right away    See your regular doctor within 2  weeks of an Emergency Room or Urgent Care visit for follow-up treatment.          Annual Reminders:  Meet with Asthma Educator,  Flu Shot in the Fall, consider Pneumonia Vaccination for patients with asthma (aged 19 and older).    Pharmacy: [facility] [id number] [place] [address]    Electronically signed by KANDI MASTERS MD   Date: 12/12/22                    Asthma Triggers  How To Control Things That Make Your Asthma Worse    Triggers are things that make your asthma worse.  Look at the list below to help you find your triggers and   what you can do about them. You can help prevent asthma flare-ups by staying away from your triggers.      Trigger                                                          What you can do   Cigarette Smoke  Tobacco smoke can make asthma worse. Do not allow smoking in your home, car or around you.  Be sure no one smokes at a child s day care or school.  If you smoke, ask your health care provider for ways to help you quit.  Ask family members to quit too.  Ask your health care provider for a referral to Quit Plan to help you quit smoking, or call 7-972-084-PLAN.     Colds, Flu, Bronchitis  These are common triggers of asthma. Wash your hands often.  Don t touch your eyes, nose or mouth.  Get a flu shot every year.     Dust Mites  These are tiny bugs that live in cloth or carpet. They are too small to see. Wash sheets and blankets in hot water every week.   Encase pillows and mattress in dust mite proof covers.  Avoid having carpet if you can. If you have carpet, vacuum weekly.   Use a dust mask and HEPA vacuum.   Pollen and Outdoor Mold  Some people are allergic to trees, grass, or weed pollen, or molds. Try to keep your windows closed.  Limit time out doors when pollen count is high.   Ask you health care provider about taking medicine during allergy season.     Animal Dander  Some people are allergic to skin flakes, urine or saliva from pets with  fur or feathers. Keep pets with fur or feathers out of your home.    If you can t keep the pet outdoors, then keep the pet out of your bedroom.  Keep the bedroom door closed.  Keep pets off cloth furniture and away from stuffed toys.     Mice, Rats, and Cockroaches  Some people are allergic to the waste from these pests.   Cover food and garbage.  Clean up spills and food crumbs.  Store grease in the refrigerator.   Keep food out of the bedroom.   Indoor Mold  This can be a trigger if your home has high moisture. Fix leaking faucets, pipes, or other sources of water.   Clean moldy surfaces.  Dehumidify basement if it is damp and smelly.   Smoke, Strong Odors, and Sprays  These can reduce air quality. Stay away from strong odors and sprays, such as perfume, powder, hair spray, paints, smoke incense, paint, cleaning products, candles and new carpet.   Exercise or Sports  Some people with asthma have this trigger. Be active!  Ask your doctor about taking medicine before sports or exercise to prevent symptoms.    Warm up for 5-10 minutes before and after sports or exercise.     Other Triggers of Asthma  Cold air:  Cover your nose and mouth with a scarf.  Sometimes laughing or crying can be a trigger.  Some medicines and food can trigger asthma.

## 2023-02-14 ENCOUNTER — ALLIED HEALTH/NURSE VISIT (OUTPATIENT)
Dept: FAMILY MEDICINE | Facility: OTHER | Age: 68
End: 2023-02-14
Attending: FAMILY MEDICINE
Payer: COMMERCIAL

## 2023-02-14 DIAGNOSIS — Z20.822 COVID-19 RULED OUT: Primary | ICD-10-CM

## 2023-02-14 LAB — SARS-COV-2 RNA RESP QL NAA+PROBE: NEGATIVE

## 2023-02-14 PROCEDURE — U0003 INFECTIOUS AGENT DETECTION BY NUCLEIC ACID (DNA OR RNA); SEVERE ACUTE RESPIRATORY SYNDROME CORONAVIRUS 2 (SARS-COV-2) (CORONAVIRUS DISEASE [COVID-19]), AMPLIFIED PROBE TECHNIQUE, MAKING USE OF HIGH THROUGHPUT TECHNOLOGIES AS DESCRIBED BY CMS-2020-01-R: HCPCS | Mod: ZL

## 2023-02-14 PROCEDURE — C9803 HOPD COVID-19 SPEC COLLECT: HCPCS

## 2023-02-14 NOTE — PROGRESS NOTES
Patient exposed to covid this past weekend and is hoping to visit someone in hospital- here for testing    Corinne R Thayer, RN on 2/14/2023 at 1:30 PM

## 2023-04-05 ENCOUNTER — OFFICE VISIT (OUTPATIENT)
Dept: FAMILY MEDICINE | Facility: OTHER | Age: 68
End: 2023-04-05
Attending: FAMILY MEDICINE
Payer: COMMERCIAL

## 2023-04-05 VITALS
SYSTOLIC BLOOD PRESSURE: 132 MMHG | OXYGEN SATURATION: 98 % | HEART RATE: 71 BPM | WEIGHT: 224.4 LBS | RESPIRATION RATE: 20 BRPM | BODY MASS INDEX: 34.12 KG/M2 | TEMPERATURE: 97 F | DIASTOLIC BLOOD PRESSURE: 78 MMHG

## 2023-04-05 DIAGNOSIS — R05.1 ACUTE COUGH: Primary | ICD-10-CM

## 2023-04-05 PROCEDURE — 99213 OFFICE O/P EST LOW 20 MIN: CPT | Performed by: FAMILY MEDICINE

## 2023-04-05 PROCEDURE — G0463 HOSPITAL OUTPT CLINIC VISIT: HCPCS

## 2023-04-05 RX ORDER — AZITHROMYCIN 250 MG/1
TABLET, FILM COATED ORAL
Qty: 6 TABLET | Refills: 0 | Status: SHIPPED | OUTPATIENT
Start: 2023-04-05 | End: 2023-04-10

## 2023-04-05 RX ORDER — CODEINE PHOSPHATE AND GUAIFENESIN 10; 100 MG/5ML; MG/5ML
1-2 SOLUTION ORAL EVERY 4 HOURS PRN
Qty: 180 ML | Refills: 0 | Status: SHIPPED | OUTPATIENT
Start: 2023-04-05 | End: 2023-08-22

## 2023-04-05 ASSESSMENT — PAIN SCALES - GENERAL: PAINLEVEL: NO PAIN (0)

## 2023-04-05 NOTE — NURSING NOTE
"Chief Complaint   Patient presents with     URI     Cold symptoms started week ago, with itchy eyes, running nose, then turned into a cough.  In the last couple days the productive cough got deep, mucus has some spots of blood, and the color has changed.        Initial /78 (BP Location: Right arm, Patient Position: Sitting, Cuff Size: Adult Large)   Pulse 71   Temp 97  F (36.1  C) (Tympanic)   Resp 20   Wt 101.8 kg (224 lb 6.4 oz)   LMP  (LMP Unknown)   SpO2 98%   BMI 34.12 kg/m   Estimated body mass index is 34.12 kg/m  as calculated from the following:    Height as of 5/27/22: 1.727 m (5' 8\").    Weight as of this encounter: 101.8 kg (224 lb 6.4 oz).  Medication Reconciliation: complete    Rosa Purdy LPN    Advance Care Directive reviewed    "

## 2023-04-05 NOTE — PROGRESS NOTES
Assessment & Plan       ICD-10-CM    1. Acute cough  R05.1 azithromycin (ZITHROMAX) 250 MG tablet     guaiFENesin-codeine (ROBITUSSIN AC) 100-10 MG/5ML solution        Discussed likely viral etiology of symptoms.  Encouraged to continue with symptomatic management.  Additional prescription provided for Robitussin with codeine to use at night if needed.    Did provide antibiotics to start if symptoms worsen or she does not notice improvement over the next week.    Patient feels comfortable with this plan.       No follow-ups on file.    Layne Wellington MD  [facility]    River Richter is a 67 year old, presenting for the following health issues:  URI (Cold symptoms started week ago, with itchy eyes, running nose, then turned into a cough.  In the last couple days the productive cough got deep, mucus has some spots of blood, and the color has changed. )        4/5/2023     9:38 AM   Additional Questions   Roomed by Rosa   Accompanied by self     URI    History of Present Illness       Reason for visit:  Coughing and cold symptoms, sputum is discolored  Symptom onset:  3-7 days ago  Symptoms include:  Coughing    She eats 2-3 servings of fruits and vegetables daily.She consumes 0 sweetened beverage(s) daily.She exercises with enough effort to increase her heart rate 10 to 19 minutes per day.  She exercises with enough effort to increase her heart rate 3 or less days per week.   She is taking medications regularly.           Objective    /78 (BP Location: Right arm, Patient Position: Sitting, Cuff Size: Adult Large)   Pulse 71   Temp 97  F (36.1  C) (Tympanic)   Resp 20   Wt 101.8 kg (224 lb 6.4 oz)   LMP  (LMP Unknown)   SpO2 98%   BMI 34.12 kg/m    Body mass index is 34.12 kg/m .  Physical Exam  Constitutional:       Appearance: She is well-developed.   HENT:      Right Ear: Tympanic membrane normal.      Left Ear: Tympanic membrane normal.   Eyes:      Conjunctiva/sclera:  Conjunctivae normal.   Neck:      Thyroid: No thyromegaly.   Cardiovascular:      Rate and Rhythm: Normal rate and regular rhythm.      Heart sounds: Normal heart sounds. No murmur heard.  Pulmonary:      Effort: Pulmonary effort is normal. No respiratory distress.      Breath sounds: Normal breath sounds. No stridor. No wheezing, rhonchi or rales.   Abdominal:      Palpations: Abdomen is soft.   Lymphadenopathy:      Cervical: No cervical adenopathy.   Skin:     Findings: No rash.   Neurological:      Mental Status: She is alert and oriented to person, place, and time.

## 2023-05-01 ENCOUNTER — TRANSFERRED RECORDS (OUTPATIENT)
Dept: HEALTH INFORMATION MANAGEMENT | Facility: OTHER | Age: 68
End: 2023-05-01
Payer: COMMERCIAL

## 2023-05-15 DIAGNOSIS — E11.9 TYPE 2 DIABETES MELLITUS WITHOUT COMPLICATION, WITHOUT LONG-TERM CURRENT USE OF INSULIN (H): ICD-10-CM

## 2023-05-15 RX ORDER — BLOOD SUGAR DIAGNOSTIC
STRIP MISCELLANEOUS
Qty: 100 STRIP | Refills: 1 | Status: SHIPPED | OUTPATIENT
Start: 2023-05-15 | End: 2023-08-22

## 2023-06-16 ENCOUNTER — LAB (OUTPATIENT)
Dept: LAB | Facility: OTHER | Age: 68
End: 2023-06-16
Attending: FAMILY MEDICINE
Payer: COMMERCIAL

## 2023-06-16 DIAGNOSIS — R73.03 PREDIABETES: ICD-10-CM

## 2023-06-16 DIAGNOSIS — E03.9 HYPOTHYROIDISM: Primary | ICD-10-CM

## 2023-06-16 DIAGNOSIS — E34.9 ENDOCRINE DISORDER: ICD-10-CM

## 2023-06-16 DIAGNOSIS — R53.83 FATIGUE: ICD-10-CM

## 2023-06-16 DIAGNOSIS — E11.9 TYPE 2 DIABETES MELLITUS WITHOUT COMPLICATION, WITHOUT LONG-TERM CURRENT USE OF INSULIN (H): ICD-10-CM

## 2023-06-16 DIAGNOSIS — E78.5 HYPERLIPEMIA: ICD-10-CM

## 2023-06-16 LAB
ALBUMIN SERPL BCG-MCNC: 4.6 G/DL (ref 3.5–5.2)
ALP SERPL-CCNC: 85 U/L (ref 35–104)
ALT SERPL W P-5'-P-CCNC: 41 U/L (ref 0–50)
ANION GAP SERPL CALCULATED.3IONS-SCNC: 10 MMOL/L (ref 7–15)
AST SERPL W P-5'-P-CCNC: 36 U/L (ref 0–45)
BILIRUB SERPL-MCNC: 0.5 MG/DL
BUN SERPL-MCNC: 13.7 MG/DL (ref 8–23)
CALCIUM SERPL-MCNC: 9.2 MG/DL (ref 8.8–10.2)
CHLORIDE SERPL-SCNC: 104 MMOL/L (ref 98–107)
CREAT SERPL-MCNC: 0.69 MG/DL (ref 0.51–0.95)
DEPRECATED HCO3 PLAS-SCNC: 28 MMOL/L (ref 22–29)
GFR SERPL CREATININE-BSD FRML MDRD: >90 ML/MIN/1.73M2
GLUCOSE SERPL-MCNC: 134 MG/DL (ref 70–99)
HBA1C MFR BLD: 6.4 % (ref 4–6.2)
POTASSIUM SERPL-SCNC: 4.3 MMOL/L (ref 3.4–5.3)
PROT SERPL-MCNC: 7.7 G/DL (ref 6.4–8.3)
SODIUM SERPL-SCNC: 142 MMOL/L (ref 136–145)
T3FREE SERPL-MCNC: 3.1 PG/ML (ref 2–4.4)
T4 FREE SERPL-MCNC: 1.04 NG/DL (ref 0.9–1.7)
TSH SERPL DL<=0.005 MIU/L-ACNC: 0.04 UIU/ML (ref 0.3–4.2)

## 2023-06-16 PROCEDURE — 80053 COMPREHEN METABOLIC PANEL: CPT | Mod: ZL

## 2023-06-16 PROCEDURE — 83036 HEMOGLOBIN GLYCOSYLATED A1C: CPT | Mod: ZL

## 2023-06-16 PROCEDURE — 82627 DEHYDROEPIANDROSTERONE: CPT | Mod: ZL

## 2023-06-16 PROCEDURE — 84443 ASSAY THYROID STIM HORMONE: CPT | Mod: ZL

## 2023-06-16 PROCEDURE — 84270 ASSAY OF SEX HORMONE GLOBUL: CPT | Mod: ZL

## 2023-06-16 PROCEDURE — 84481 FREE ASSAY (FT-3): CPT | Mod: ZL

## 2023-06-16 PROCEDURE — 84403 ASSAY OF TOTAL TESTOSTERONE: CPT | Mod: ZL

## 2023-06-16 PROCEDURE — 86141 C-REACTIVE PROTEIN HS: CPT | Mod: ZL

## 2023-06-16 PROCEDURE — 84439 ASSAY OF FREE THYROXINE: CPT | Mod: ZL

## 2023-06-16 PROCEDURE — 36415 COLL VENOUS BLD VENIPUNCTURE: CPT | Mod: ZL

## 2023-06-17 LAB
CRP SERPL HS-MCNC: 0.97 MG/L
SHBG SERPL-SCNC: 37 NMOL/L (ref 30–135)
SHBG SERPL-SCNC: 40 NMOL/L (ref 30–135)

## 2023-06-19 LAB — DHEA-S SERPL-MCNC: 41 UG/DL (ref 35–430)

## 2023-06-20 LAB
TESTOST FREE SERPL-MCNC: 0.12 NG/DL
TESTOST SERPL-MCNC: 7 NG/DL (ref 8–60)

## 2023-07-09 ENCOUNTER — HEALTH MAINTENANCE LETTER (OUTPATIENT)
Age: 68
End: 2023-07-09

## 2023-07-10 DIAGNOSIS — E78.5 HYPERLIPIDEMIA, UNSPECIFIED HYPERLIPIDEMIA TYPE: ICD-10-CM

## 2023-07-12 RX ORDER — SIMVASTATIN 20 MG
TABLET ORAL
Qty: 90 TABLET | Refills: 1 | Status: SHIPPED | OUTPATIENT
Start: 2023-07-12 | End: 2023-08-22

## 2023-07-12 NOTE — TELEPHONE ENCOUNTER
Routing refill request to provider for review/approval because:    LOV; 12/12/22    Minal Tay RN on 7/12/2023 at 3:41 PM

## 2023-07-28 DIAGNOSIS — E11.9 TYPE 2 DIABETES MELLITUS WITHOUT COMPLICATION, WITHOUT LONG-TERM CURRENT USE OF INSULIN (H): ICD-10-CM

## 2023-07-28 RX ORDER — HYDROCHLOROTHIAZIDE 25 MG/1
TABLET ORAL
Qty: 90 TABLET | Refills: 3 | Status: SHIPPED | OUTPATIENT
Start: 2023-07-28 | End: 2024-07-25

## 2023-08-08 ENCOUNTER — HOSPITAL ENCOUNTER (OUTPATIENT)
Dept: MAMMOGRAPHY | Facility: OTHER | Age: 68
Discharge: HOME | End: 2023-08-08
Attending: FAMILY MEDICINE | Admitting: FAMILY MEDICINE
Payer: COMMERCIAL

## 2023-08-08 DIAGNOSIS — Z12.31 VISIT FOR SCREENING MAMMOGRAM: ICD-10-CM

## 2023-08-08 PROCEDURE — 77067 SCR MAMMO BI INCL CAD: CPT

## 2023-08-22 ENCOUNTER — OFFICE VISIT (OUTPATIENT)
Dept: FAMILY MEDICINE | Facility: OTHER | Age: 68
End: 2023-08-22
Attending: FAMILY MEDICINE
Payer: COMMERCIAL

## 2023-08-22 VITALS
HEART RATE: 72 BPM | BODY MASS INDEX: 34.25 KG/M2 | OXYGEN SATURATION: 98 % | WEIGHT: 226 LBS | HEIGHT: 68 IN | SYSTOLIC BLOOD PRESSURE: 114 MMHG | RESPIRATION RATE: 18 BRPM | DIASTOLIC BLOOD PRESSURE: 68 MMHG | TEMPERATURE: 97.8 F

## 2023-08-22 DIAGNOSIS — Z00.00 MEDICARE ANNUAL WELLNESS VISIT, SUBSEQUENT: Primary | ICD-10-CM

## 2023-08-22 DIAGNOSIS — L30.8 OTHER ECZEMA: ICD-10-CM

## 2023-08-22 DIAGNOSIS — K21.00 GASTROESOPHAGEAL REFLUX DISEASE WITH ESOPHAGITIS WITHOUT HEMORRHAGE: ICD-10-CM

## 2023-08-22 DIAGNOSIS — Z23 NEED FOR VACCINATION FOR PNEUMOCOCCUS: ICD-10-CM

## 2023-08-22 DIAGNOSIS — E78.5 HYPERLIPIDEMIA, UNSPECIFIED HYPERLIPIDEMIA TYPE: ICD-10-CM

## 2023-08-22 DIAGNOSIS — E11.9 TYPE 2 DIABETES MELLITUS WITHOUT COMPLICATION, WITHOUT LONG-TERM CURRENT USE OF INSULIN (H): ICD-10-CM

## 2023-08-22 DIAGNOSIS — I10 ESSENTIAL HYPERTENSION: ICD-10-CM

## 2023-08-22 DIAGNOSIS — M21.611 BILATERAL BUNIONS: ICD-10-CM

## 2023-08-22 DIAGNOSIS — M21.612 BILATERAL BUNIONS: ICD-10-CM

## 2023-08-22 DIAGNOSIS — E03.9 ACQUIRED HYPOTHYROIDISM: ICD-10-CM

## 2023-08-22 DIAGNOSIS — B37.9 YEAST INFECTION: ICD-10-CM

## 2023-08-22 LAB
ALBUMIN UR-MCNC: NEGATIVE MG/DL
APPEARANCE UR: CLEAR
BILIRUB UR QL STRIP: NEGATIVE
CHOLEST SERPL-MCNC: 128 MG/DL
COLOR UR AUTO: NORMAL
GLUCOSE UR STRIP-MCNC: NEGATIVE MG/DL
HDLC SERPL-MCNC: 36 MG/DL
HGB UR QL STRIP: NEGATIVE
HOLD SPECIMEN: NORMAL
KETONES UR STRIP-MCNC: NEGATIVE MG/DL
LDLC SERPL CALC-MCNC: 64 MG/DL
LEUKOCYTE ESTERASE UR QL STRIP: NEGATIVE
NITRATE UR QL: NEGATIVE
NONHDLC SERPL-MCNC: 92 MG/DL
PH UR STRIP: 7.5 [PH] (ref 5–9)
SP GR UR STRIP: 1.01 (ref 1–1.03)
TRIGL SERPL-MCNC: 142 MG/DL
UROBILINOGEN UR STRIP-MCNC: NORMAL MG/DL

## 2023-08-22 PROCEDURE — 90732 PPSV23 VACC 2 YRS+ SUBQ/IM: CPT

## 2023-08-22 PROCEDURE — 80061 LIPID PANEL: CPT | Mod: ZL | Performed by: FAMILY MEDICINE

## 2023-08-22 PROCEDURE — 99213 OFFICE O/P EST LOW 20 MIN: CPT | Mod: 25 | Performed by: FAMILY MEDICINE

## 2023-08-22 PROCEDURE — 81003 URINALYSIS AUTO W/O SCOPE: CPT | Mod: ZL | Performed by: FAMILY MEDICINE

## 2023-08-22 PROCEDURE — 36415 COLL VENOUS BLD VENIPUNCTURE: CPT | Mod: ZL | Performed by: FAMILY MEDICINE

## 2023-08-22 PROCEDURE — G0439 PPPS, SUBSEQ VISIT: HCPCS | Performed by: FAMILY MEDICINE

## 2023-08-22 PROCEDURE — G0463 HOSPITAL OUTPT CLINIC VISIT: HCPCS | Mod: 25

## 2023-08-22 RX ORDER — FLUOCINOLONE ACETONIDE 0.1 MG/G
CREAM TOPICAL 2 TIMES DAILY
Qty: 60 G | Refills: 3 | Status: SHIPPED | OUTPATIENT
Start: 2023-08-22

## 2023-08-22 RX ORDER — FLUCONAZOLE 100 MG/1
100 TABLET ORAL PRN
Qty: 90 TABLET | Refills: 2 | Status: SHIPPED | OUTPATIENT
Start: 2023-08-22

## 2023-08-22 RX ORDER — LISINOPRIL 20 MG/1
20 TABLET ORAL DAILY
Qty: 90 TABLET | Refills: 3 | Status: SHIPPED | OUTPATIENT
Start: 2023-08-22

## 2023-08-22 RX ORDER — BLOOD SUGAR DIAGNOSTIC
STRIP MISCELLANEOUS
Qty: 100 STRIP | Refills: 1 | Status: SHIPPED | OUTPATIENT
Start: 2023-08-22 | End: 2024-06-16

## 2023-08-22 RX ORDER — SIMVASTATIN 20 MG
20 TABLET ORAL AT BEDTIME
Qty: 90 TABLET | Refills: 3 | Status: SHIPPED | OUTPATIENT
Start: 2023-08-22

## 2023-08-22 RX ORDER — THYROID 60 MG/1
180 TABLET ORAL DAILY
Qty: 270 TABLET | Refills: 3 | Status: SHIPPED | OUTPATIENT
Start: 2023-08-22 | End: 2024-05-14

## 2023-08-22 ASSESSMENT — ENCOUNTER SYMPTOMS
PARESTHESIAS: 1
ARTHRALGIAS: 1
NERVOUS/ANXIOUS: 1
WEAKNESS: 0
DYSURIA: 0
CHILLS: 0
HEMATOCHEZIA: 0
CONSTIPATION: 0
JOINT SWELLING: 0
DIARRHEA: 0
FEVER: 0
MYALGIAS: 1
ABDOMINAL PAIN: 0
PALPITATIONS: 1
DIZZINESS: 0
BREAST MASS: 0
COUGH: 0
NAUSEA: 0
FREQUENCY: 0
SHORTNESS OF BREATH: 1
EYE PAIN: 0
SORE THROAT: 0
HEMATURIA: 0
HEARTBURN: 0

## 2023-08-22 ASSESSMENT — ACTIVITIES OF DAILY LIVING (ADL): CURRENT_FUNCTION: NO ASSISTANCE NEEDED

## 2023-08-22 ASSESSMENT — PAIN SCALES - GENERAL: PAINLEVEL: MODERATE PAIN (5)

## 2023-08-22 ASSESSMENT — ASTHMA QUESTIONNAIRES: ACT_TOTALSCORE: 24

## 2023-08-22 NOTE — NURSING NOTE
Patient is here for medicare wellness, would like to discuss medications, wants a referral, discuss hearing, has a sore area on back and skin irritation on head.     Previous A1C is at goal of <8  Lab Results   Component Value Date    A1C 6.4 06/16/2023    A1C 6.4 12/12/2022    A1C 7.1 05/27/2022    A1C 6.7 11/03/2021    A1C 6.9 04/06/2021    A1C 6.8 10/02/2020    A1C 6.7 03/10/2020    A1C 6.7 12/17/2019    A1C 6.3 09/11/2019     Urine Microalbumin/Creat:    Albumin Urine mg/L   Date Value Ref Range Status   12/12/2022 <12.0 mg/L Final     Comment:     The reference ranges have not been established in urine albumin. The results should be integrated into the clinical context for interpretation.   11/03/2021 10 mg/L Final   10/02/2020 9 mg/L Final     Albumin Urine mg/g Cr   Date Value Ref Range Status   12/12/2022   Final     Comment:     Unable to calculate, urine albumin and/or urine creatinine is outside detectable limits.  Microalbuminuria is defined as an albumin:creatinine ratio of 17 to 299 for males and 25 to 299 for females. A ratio of albumin:creatinine of 300 or higher is indicative of overt proteinuria.  Due to biologic variability, positive results should be confirmed by a second, first-morning random or 24-hour timed urine specimen. If there is discrepancy, a third specimen is recommended. When 2 out of 3 results are in the microalbuminuria range, this is evidence for incipient nephropathy and warrants increased efforts at glucose control, blood pressure control, and institution of therapy with an angiotensin-converting-enzyme (ACE) inhibitor (if the patient can tolerate it).     11/03/2021 10.53 0.00 - 25.00 mg/g Cr Final   10/02/2020 6.73 0 - 25 mg/g Cr Final     Creatinine Urine   Date Value Ref Range Status   10/02/2020 133 mg/dL Final     Creatinine Urine mg/dL   Date Value Ref Range Status   12/12/2022 35.3 mg/dL Final     Comment:     The reference ranges have not been established in urine  creatinine. The results should be integrated into the clinical context for interpretation.   11/03/2021 95 mg/dL Final     Foot exam 12/2022  Eye exam 10/2022    Tobacco User no  Patient is on a daily aspirin  Patient is on a Statin.  Blood pressure today of:     BP Readings from Last 1 Encounters:   08/22/23 114/68      is at the goal of <139/89 for diabetics.    Marley Guerrier LPN on 8/22/2023 at 8:44 AM      No LMP recorded (lmp unknown). Patient has had a hysterectomy.  Medication Reconciliation: complete      Advance care directive on file? yes  Advance care directive provided to patient? na       Marley Guerrier LPN

## 2023-09-07 ENCOUNTER — MYC MEDICAL ADVICE (OUTPATIENT)
Dept: FAMILY MEDICINE | Facility: OTHER | Age: 68
End: 2023-09-07
Payer: COMMERCIAL

## 2023-09-07 NOTE — TELEPHONE ENCOUNTER
Reached out to Referrals to send this referral to [facility] today.    They will.    [facility]    Martine Castañeda RN on 9/7/2023 at 11:20 AM

## 2023-09-08 ENCOUNTER — LAB (OUTPATIENT)
Dept: LAB | Facility: OTHER | Age: 68
End: 2023-09-08
Attending: EMERGENCY MEDICINE
Payer: COMMERCIAL

## 2023-09-08 DIAGNOSIS — R73.09 DISORDER OF BLOOD GLUCOSE: ICD-10-CM

## 2023-09-08 DIAGNOSIS — E03.9 HYPOTHYROIDISM, ADULT: ICD-10-CM

## 2023-09-08 DIAGNOSIS — E78.5 HYPERLIPEMIA: ICD-10-CM

## 2023-09-08 DIAGNOSIS — E34.9 ENDOCRINE PROBLEM: ICD-10-CM

## 2023-09-08 DIAGNOSIS — R53.83 FATIGUE: Primary | ICD-10-CM

## 2023-09-08 LAB
FERRITIN SERPL-MCNC: 66 NG/ML (ref 11–328)
HBA1C MFR BLD: 6.9 % (ref 4–6.2)
T3FREE SERPL-MCNC: 2.9 PG/ML (ref 2–4.4)
T4 FREE SERPL-MCNC: 0.97 NG/DL (ref 0.9–1.7)
TSH SERPL DL<=0.005 MIU/L-ACNC: 0.06 UIU/ML (ref 0.3–4.2)

## 2023-09-08 PROCEDURE — 84439 ASSAY OF FREE THYROXINE: CPT | Mod: ZL

## 2023-09-08 PROCEDURE — 83036 HEMOGLOBIN GLYCOSYLATED A1C: CPT | Mod: ZL

## 2023-09-08 PROCEDURE — 84270 ASSAY OF SEX HORMONE GLOBUL: CPT | Mod: ZL

## 2023-09-08 PROCEDURE — 84443 ASSAY THYROID STIM HORMONE: CPT | Mod: ZL

## 2023-09-08 PROCEDURE — 82627 DEHYDROEPIANDROSTERONE: CPT | Mod: ZL

## 2023-09-08 PROCEDURE — 84481 FREE ASSAY (FT-3): CPT | Mod: ZL

## 2023-09-08 PROCEDURE — 82728 ASSAY OF FERRITIN: CPT | Mod: ZL

## 2023-09-08 PROCEDURE — 36415 COLL VENOUS BLD VENIPUNCTURE: CPT | Mod: ZL

## 2023-09-08 PROCEDURE — 84403 ASSAY OF TOTAL TESTOSTERONE: CPT | Mod: ZL

## 2023-09-09 LAB — SHBG SERPL-SCNC: 32 NMOL/L (ref 30–135)

## 2023-09-11 LAB — DHEA-S SERPL-MCNC: 71 UG/DL (ref 35–430)

## 2023-09-12 LAB
TESTOST FREE SERPL-MCNC: 0.14 NG/DL
TESTOST SERPL-MCNC: 8 NG/DL (ref 8–60)

## 2023-11-14 ENCOUNTER — TRANSFERRED RECORDS (OUTPATIENT)
Dept: HEALTH INFORMATION MANAGEMENT | Facility: OTHER | Age: 68
End: 2023-11-14
Payer: COMMERCIAL

## 2023-11-14 LAB — RETINOPATHY: NEGATIVE

## 2023-11-27 ENCOUNTER — TRANSFERRED RECORDS (OUTPATIENT)
Dept: HEALTH INFORMATION MANAGEMENT | Facility: OTHER | Age: 68
End: 2023-11-27

## 2023-11-27 ENCOUNTER — LAB (OUTPATIENT)
Dept: LAB | Facility: OTHER | Age: 68
End: 2023-11-27
Payer: COMMERCIAL

## 2023-11-27 DIAGNOSIS — R53.83 FATIGUE: ICD-10-CM

## 2023-11-27 DIAGNOSIS — E03.9 HYPOTHYROIDISM, ADULT: Primary | ICD-10-CM

## 2023-11-27 LAB
ALBUMIN SERPL BCG-MCNC: 4.4 G/DL (ref 3.5–5.2)
ALP SERPL-CCNC: 79 U/L (ref 40–150)
ALT SERPL W P-5'-P-CCNC: 40 U/L (ref 0–50)
ANION GAP SERPL CALCULATED.3IONS-SCNC: 8 MMOL/L (ref 7–15)
AST SERPL W P-5'-P-CCNC: 30 U/L (ref 0–45)
BILIRUB SERPL-MCNC: 0.5 MG/DL
BUN SERPL-MCNC: 13.6 MG/DL (ref 8–23)
CALCIUM SERPL-MCNC: 9.4 MG/DL (ref 8.8–10.2)
CHLORIDE SERPL-SCNC: 103 MMOL/L (ref 98–107)
CREAT SERPL-MCNC: 0.75 MG/DL (ref 0.51–0.95)
DEPRECATED HCO3 PLAS-SCNC: 30 MMOL/L (ref 22–29)
EGFRCR SERPLBLD CKD-EPI 2021: 86 ML/MIN/1.73M2
GLUCOSE SERPL-MCNC: 147 MG/DL (ref 70–99)
POTASSIUM SERPL-SCNC: 4.1 MMOL/L (ref 3.4–5.3)
PROT SERPL-MCNC: 7.7 G/DL (ref 6.4–8.3)
SODIUM SERPL-SCNC: 141 MMOL/L (ref 135–145)
T3FREE SERPL-MCNC: 2.9 PG/ML (ref 2–4.4)
T4 FREE SERPL-MCNC: 0.84 NG/DL (ref 0.9–1.7)
TSH SERPL DL<=0.005 MIU/L-ACNC: 0.24 UIU/ML (ref 0.3–4.2)

## 2023-11-27 PROCEDURE — 36415 COLL VENOUS BLD VENIPUNCTURE: CPT | Mod: ZL

## 2023-11-27 PROCEDURE — 80053 COMPREHEN METABOLIC PANEL: CPT | Mod: ZL

## 2023-11-27 PROCEDURE — 84439 ASSAY OF FREE THYROXINE: CPT | Mod: ZL

## 2023-11-27 PROCEDURE — 84481 FREE ASSAY (FT-3): CPT | Mod: ZL

## 2023-11-27 PROCEDURE — 84443 ASSAY THYROID STIM HORMONE: CPT | Mod: ZL

## 2024-02-07 ENCOUNTER — TRANSFERRED RECORDS (OUTPATIENT)
Dept: HEALTH INFORMATION MANAGEMENT | Facility: OTHER | Age: 69
End: 2024-02-07
Payer: COMMERCIAL

## 2024-04-13 ENCOUNTER — HEALTH MAINTENANCE LETTER (OUTPATIENT)
Age: 69
End: 2024-04-13

## 2024-05-08 ASSESSMENT — ASTHMA QUESTIONNAIRES
QUESTION_2 LAST FOUR WEEKS HOW OFTEN HAVE YOU HAD SHORTNESS OF BREATH: NOT AT ALL
ACT_TOTALSCORE: 25
QUESTION_3 LAST FOUR WEEKS HOW OFTEN DID YOUR ASTHMA SYMPTOMS (WHEEZING, COUGHING, SHORTNESS OF BREATH, CHEST TIGHTNESS OR PAIN) WAKE YOU UP AT NIGHT OR EARLIER THAN USUAL IN THE MORNING: NOT AT ALL
QUESTION_5 LAST FOUR WEEKS HOW WOULD YOU RATE YOUR ASTHMA CONTROL: COMPLETELY CONTROLLED
QUESTION_1 LAST FOUR WEEKS HOW MUCH OF THE TIME DID YOUR ASTHMA KEEP YOU FROM GETTING AS MUCH DONE AT WORK, SCHOOL OR AT HOME: NONE OF THE TIME
QUESTION_4 LAST FOUR WEEKS HOW OFTEN HAVE YOU USED YOUR RESCUE INHALER OR NEBULIZER MEDICATION (SUCH AS ALBUTEROL): NOT AT ALL
ACT_TOTALSCORE: 25

## 2024-05-14 ENCOUNTER — OFFICE VISIT (OUTPATIENT)
Dept: FAMILY MEDICINE | Facility: OTHER | Age: 69
End: 2024-05-14
Attending: FAMILY MEDICINE
Payer: COMMERCIAL

## 2024-05-14 ENCOUNTER — HOSPITAL ENCOUNTER (OUTPATIENT)
Dept: GENERAL RADIOLOGY | Facility: OTHER | Age: 69
Discharge: HOME | End: 2024-05-14
Attending: FAMILY MEDICINE
Payer: COMMERCIAL

## 2024-05-14 VITALS
SYSTOLIC BLOOD PRESSURE: 132 MMHG | RESPIRATION RATE: 18 BRPM | OXYGEN SATURATION: 95 % | WEIGHT: 228 LBS | HEIGHT: 68 IN | BODY MASS INDEX: 34.56 KG/M2 | DIASTOLIC BLOOD PRESSURE: 80 MMHG | TEMPERATURE: 96.9 F | HEART RATE: 75 BPM

## 2024-05-14 DIAGNOSIS — I10 ESSENTIAL HYPERTENSION: ICD-10-CM

## 2024-05-14 DIAGNOSIS — M54.6 CHRONIC BILATERAL THORACIC BACK PAIN: ICD-10-CM

## 2024-05-14 DIAGNOSIS — E03.9 ACQUIRED HYPOTHYROIDISM: ICD-10-CM

## 2024-05-14 DIAGNOSIS — M54.50 CHRONIC BILATERAL LOW BACK PAIN WITHOUT SCIATICA: ICD-10-CM

## 2024-05-14 DIAGNOSIS — G89.29 CHRONIC BILATERAL LOW BACK PAIN WITHOUT SCIATICA: ICD-10-CM

## 2024-05-14 DIAGNOSIS — E66.01 MORBID OBESITY (H): ICD-10-CM

## 2024-05-14 DIAGNOSIS — E11.9 TYPE 2 DIABETES MELLITUS WITHOUT COMPLICATION, WITHOUT LONG-TERM CURRENT USE OF INSULIN (H): Primary | ICD-10-CM

## 2024-05-14 DIAGNOSIS — G89.29 CHRONIC BILATERAL THORACIC BACK PAIN: ICD-10-CM

## 2024-05-14 DIAGNOSIS — M85.88 OSTEOPENIA OF SPINE: ICD-10-CM

## 2024-05-14 DIAGNOSIS — E66.811 CLASS 1 OBESITY WITH SERIOUS COMORBIDITY AND BODY MASS INDEX (BMI) OF 34.0 TO 34.9 IN ADULT, UNSPECIFIED OBESITY TYPE: ICD-10-CM

## 2024-05-14 DIAGNOSIS — E78.5 HYPERLIPIDEMIA, UNSPECIFIED HYPERLIPIDEMIA TYPE: ICD-10-CM

## 2024-05-14 DIAGNOSIS — R07.9 CHEST PAIN, UNSPECIFIED TYPE: ICD-10-CM

## 2024-05-14 LAB
ALBUMIN SERPL BCG-MCNC: 4.7 G/DL (ref 3.5–5.2)
ALP SERPL-CCNC: 78 U/L (ref 40–150)
ALT SERPL W P-5'-P-CCNC: 40 U/L (ref 0–50)
ANION GAP SERPL CALCULATED.3IONS-SCNC: 11 MMOL/L (ref 7–15)
AST SERPL W P-5'-P-CCNC: 40 U/L (ref 0–45)
BILIRUB SERPL-MCNC: 0.5 MG/DL
BUN SERPL-MCNC: 15.5 MG/DL (ref 8–23)
CALCIUM SERPL-MCNC: 9.6 MG/DL (ref 8.8–10.2)
CHLORIDE SERPL-SCNC: 102 MMOL/L (ref 98–107)
CREAT SERPL-MCNC: 0.73 MG/DL (ref 0.51–0.95)
CREAT UR-MCNC: 89.7 MG/DL
DEPRECATED HCO3 PLAS-SCNC: 28 MMOL/L (ref 22–29)
EGFRCR SERPLBLD CKD-EPI 2021: 89 ML/MIN/1.73M2
GLUCOSE SERPL-MCNC: 136 MG/DL (ref 70–99)
HBA1C MFR BLD: 6.6 % (ref 4–6.2)
MICROALBUMIN UR-MCNC: <12 MG/L
MICROALBUMIN/CREAT UR: NORMAL MG/G{CREAT}
POTASSIUM SERPL-SCNC: 3.7 MMOL/L (ref 3.4–5.3)
PROT SERPL-MCNC: 8.1 G/DL (ref 6.4–8.3)
SODIUM SERPL-SCNC: 141 MMOL/L (ref 135–145)

## 2024-05-14 PROCEDURE — G0463 HOSPITAL OUTPT CLINIC VISIT: HCPCS

## 2024-05-14 PROCEDURE — G2211 COMPLEX E/M VISIT ADD ON: HCPCS | Performed by: FAMILY MEDICINE

## 2024-05-14 PROCEDURE — 36415 COLL VENOUS BLD VENIPUNCTURE: CPT | Mod: ZL | Performed by: FAMILY MEDICINE

## 2024-05-14 PROCEDURE — 83036 HEMOGLOBIN GLYCOSYLATED A1C: CPT | Mod: ZL | Performed by: FAMILY MEDICINE

## 2024-05-14 PROCEDURE — 72070 X-RAY EXAM THORAC SPINE 2VWS: CPT

## 2024-05-14 PROCEDURE — 82374 ASSAY BLOOD CARBON DIOXIDE: CPT | Mod: ZL | Performed by: FAMILY MEDICINE

## 2024-05-14 PROCEDURE — 99214 OFFICE O/P EST MOD 30 MIN: CPT | Performed by: FAMILY MEDICINE

## 2024-05-14 PROCEDURE — 82040 ASSAY OF SERUM ALBUMIN: CPT | Mod: ZL | Performed by: FAMILY MEDICINE

## 2024-05-14 PROCEDURE — 82043 UR ALBUMIN QUANTITATIVE: CPT | Mod: ZL | Performed by: FAMILY MEDICINE

## 2024-05-14 PROCEDURE — 93010 ELECTROCARDIOGRAM REPORT: CPT | Performed by: STUDENT IN AN ORGANIZED HEALTH CARE EDUCATION/TRAINING PROGRAM

## 2024-05-14 PROCEDURE — 72100 X-RAY EXAM L-S SPINE 2/3 VWS: CPT

## 2024-05-14 PROCEDURE — 93005 ELECTROCARDIOGRAM TRACING: CPT | Performed by: FAMILY MEDICINE

## 2024-05-14 RX ORDER — THYROID 60 MG/1
TABLET ORAL
COMMUNITY
Start: 2024-05-14

## 2024-05-14 ASSESSMENT — PAIN SCALES - GENERAL: PAINLEVEL: MILD PAIN (3)

## 2024-05-14 NOTE — PROGRESS NOTES
Assessment & Plan       ICD-10-CM    1. Type 2 diabetes mellitus without complication, without long-term current use of insulin (H)  E11.9 Hemoglobin A1c     Albumin Random Urine Quantitative with Creat Ratio     tirzepatide (MOUNJARO) 2.5 MG/0.5ML pen     Albumin Random Urine Quantitative with Creat Ratio     Hemoglobin A1c      2. Acquired hypothyroidism  E03.9 thyroid (ARMOUR) 60 MG tablet     CANCELED: TSH      3. Essential hypertension  I10 Comprehensive Metabolic Panel     EKG 12-lead complete w/read - Clinics     Comprehensive Metabolic Panel      4. Hyperlipidemia, unspecified hyperlipidemia type  E78.5 Comprehensive Metabolic Panel     Comprehensive Metabolic Panel      5. Chest pain, unspecified type  R07.9       6. Chronic bilateral thoracic back pain  M54.6 XR Thoracic Spine 2 Views    G89.29       7. Chronic bilateral low back pain without sciatica  M54.50 XR Lumbar Spine 2/3 Views    G89.29       8. Class 1 obesity with serious comorbidity and body mass index (BMI) of 34.0 to 34.9 in adult, unspecified obesity type  E66.9     Z68.34       9. Morbid obesity (H)  E66.01       10. Osteopenia of spine  M85.88 DX Bone Density           Type 2 diabetes in patient with obesity with past history of lapband surgery.  She is appropriate candidate for GLP1 without contraindications.  Prescription for mounjaro 2.5mg a week given.  Discussed side effects of nausea and vomiting, constipation.  If develops abdomen pain, pain or swelling in neck - stop medication and be seen.  Limit alcohol with use of GLP1 and also for wt management.  A1C as below  Patient to continue thyroid follow up with outside provider.  Follow up dexa due - ordered   EKG reviewed - within normal limits - patient with risk factors for heart disease, history not consistent with angina/anginal equivalent   Continue same hypertension management  Grief - one year after death of wife discussed  Continue lipid management  X-ray of T and L spine  "obtained  - patient to share results with chiropractor     PDMP Review       None            Review of the result(s) of each unique test -    Ordering of each unique test  Prescription drug management         The longitudinal plan of care for type 2 diabetes, hypertension  was addressed during this visit. Due to the added complexity in care, I will continue to support Neealm Miller in the subsequent management of this condition(s) and with the ongoing continuity of care of this condition(s).        BMI  Estimated body mass index is 34.67 kg/m  as calculated from the following:    Height as of this encounter: 1.727 m (5' 8\").    Weight as of this encounter: 103.4 kg (228 lb).   Weight management plan: GLP1 started       Return in about 3 months (around 8/14/2024).    KADNI MASTERS MD  [facility]    Subjective   Neealm Miller is a 68 year old female  presenting for the following health issues: Nursing Notes:   Patricia Mejia LPN  5/14/2024 11:26 AM  Signed  Chief Complaint   Patient presents with    Recheck Medication    Diabetes     DM check       Initial /80 (BP Location: Right arm, Patient Position: Sitting, Cuff Size: Adult Regular)   Pulse 75   Temp 96.9  F (36.1  C) (Temporal)   Resp 18   Ht 1.727 m (5' 8\")   Wt 103.4 kg (228 lb)   LMP  (LMP Unknown)   SpO2 95%   BMI 34.67 kg/m   Estimated body mass index is 34.67 kg/m  as calculated from the following:    Height as of this encounter: 1.727 m (5' 8\").    Weight as of this encounter: 103.4 kg (228 lb).    Medication Review: complete    Previous A1C is at goal of <8  Lab Results   Component Value Date    A1C 6.9 09/08/2023    A1C 6.4 06/16/2023    A1C 6.4 12/12/2022    A1C 7.1 05/27/2022    A1C 6.7 11/03/2021    A1C 6.9 04/06/2021    A1C 6.8 10/02/2020    A1C 6.7 03/10/2020    A1C 6.7 12/17/2019    A1C 6.3 09/11/2019     Urine microalbumin:creatine: %/14/24  Foot exam : 8/22/23  Eye exam : 11/14/23    Tobacco " User : No  Patient is on a daily aspirin  Patient is on a Statin.  Blood pressure today of:     BP Readings from Last 1 Encounters:   05/14/24 132/80      is at the goal of <139/89 for diabetics.          The next two questions are to help us understand your food security.  If you are feeling you need any assistance in this area, we have resources available to support you today.          5/8/2024   SDOH- Food Insecurity   Within the past 12 months, did you worry that your food would run out before you got money to buy more? N   Within the past 12 months, did the food you bought just not last and you didn t have money to get more? N         Health Care Directive:  Patient has a Health Care Directive on file      Patricia Mejia LPN                                 HPI Neelam Miller is a 68 year old female presents for medication follow up.  She is a year after the loss of her wife.  Eating changes - no fast food for over a year.      Alternates weeks of seeing massage and chiropractor - [place].  With particular movements it radiates around and goes to her chest with increased pressure.  She wonders too if this could be her heart.    Restricted right shoulder ROM - shoulder blade doesn't drop down.          Current Outpatient Medications   Medication Sig Dispense Refill    albuterol (PROAIR HFA/PROVENTIL HFA/VENTOLIN HFA) 108 (90 Base) MCG/ACT inhaler Inhale 2 puffs into the lungs every 6 hours 8.5 g 3    Ascorbic Acid 1000 MG TABS 5216-3180 mg daily      aspirin 81 MG tablet Take 81 mg by mouth daily with food      blood glucose (NO BRAND SPECIFIED) lancets standard Use to test blood sugar 1 times daily or as directed Dx Code E11.9 100 each 1    blood glucose (ONETOUCH ULTRA) test strip USE TO TEST BLOOD SUGAR 1 TIME DAILY. DX CODE E11.9 100 strip 1    blood glucose monitoring (ONE TOUCH ULTRA 2) meter device kit       Chromium 200 MCG CAPS       DHEA 10 MG TABS       ERGOCALCIFEROL 1,000 Units daily 5000 spring  and summer, 15,000 fall and winter      fluconazole (DIFLUCAN) 100 MG tablet Take 1 tablet (100 mg) by mouth as needed 90 tablet 2    fluocinolone (SYNALAR) 0.01 % external cream Apply topically 2 times daily 60 g 3    hydrochlorothiazide (HYDRODIURIL) 25 MG tablet TAKE 1 TABLET BY MOUTH EVERY DAY 90 tablet 3    Lancets (ONETOUCH DELICA PLUS [id number]) MISC       lisinopril (ZESTRIL) 20 MG tablet Take 1 tablet (20 mg) by mouth daily 90 tablet 3    Magnesium 400 MG CAPS Take 400 mg by mouth daily.      Milk Thistle 175 MG CAPS       Omega-3 Fatty Acids (OMEGA-3 EPA FISH OIL PO) Take 3,000 mg by mouth daily 350-400 mg cap  1 grm daily      omeprazole (PRILOSEC) 20 MG DR capsule One capsule on Sunday and Thursday 24 capsule 11    simvastatin (ZOCOR) 20 MG tablet Take 1 tablet (20 mg) by mouth At Bedtime 90 tablet 3    thyroid (ARMOUR) 60 MG tablet 180mg MWF, 120mg TThSaSu      tirzepatide (MOUNJARO) 2.5 MG/0.5ML pen Inject 2.5 mg Subcutaneous every 7 days 0.5 mL 2    Calcium-Vitamin D-Vitamin K 500-100-40 MG-UNT-MCG CHEW Take 2 tablets by mouth daily (Patient not taking: Reported on 5/14/2024)      zinc gluconate 30 MG TABS Take 1 capsule by mouth daily (Patient not taking: Reported on 5/14/2024)       Current Facility-Administered Medications   Medication Dose Route Frequency Provider Last Rate Last Admin    ipratropium - albuterol 0.5 mg/2.5 mg/3 mL (DUONEB) neb solution 3 mL  3 mL Nebulization 4x daily Tania Gonzales NP   3 mL at 06/05/19 1152     Past Medical History:   Diagnosis Date    Allergic rhinitis     No Comments Provided    Essential (primary) hypertension     No Comments Provided    Hormone replacement therapy (postmenopausal)     5/04, discontinued    Hyperlipidemia     No Comments Provided    Hypothyroidism     No Comments Provided    Major depressive disorder, single episode     03/2012    Obesity     No Comments Provided    Sleep apnea     wears cpap prn    Type 2 diabetes mellitus without  complication, without long-term current use of insulin (H) 3/5/2018         Diabetes Follow-up    How often are you checking your blood sugar? One time daily  What time of day are you checking your blood sugars (select all that apply)?  Daily in the morning  Have you had any blood sugars above 200?  No  Have you had any blood sugars below 70?  No  What symptoms do you notice when your blood sugar is low?  None and Not applicable  What concerns do you have today about your diabetes? Wonders about new medication, GLP1 for diabetes and wt management    Do you have any of these symptoms? (Select all that apply)            Hyperlipidemia Follow-Up    Are you regularly taking any medication or supplement to lower your cholesterol?   Yes-    Are you having muscle aches or other side effects that you think could be caused by your cholesterol lowering medication?  No    Hypertension Follow-up    Do you check your blood pressure regularly outside of the clinic? Yes   Are you following a low salt diet? Yes  Are your blood pressures ever more than 140 on the top number (systolic) OR more   than 90 on the bottom number (diastolic), for example 140/90? No    BP Readings from Last 2 Encounters:   05/14/24 132/80   08/22/23 114/68     Hemoglobin A1C (%)   Date Value   05/14/2024 6.6 (H)   09/08/2023 6.9 (H)   04/06/2021 6.9 (H)   10/02/2020 6.8 (H)     LDL Cholesterol Calculated (mg/dL)   Date Value   08/22/2023 64   12/12/2022 77   04/06/2021 62   03/10/2020 77         Hypothyroidism Follow-up    Since last visit, patient describes the following symptoms: Weight stable, no hair loss, no skin changes, no constipation, no loose stools      Would like xrays of T and L spine at request of her chiropractor.  Ongoing back pain.  Some movement related anterior chest pain.     Review of Systems           7/15/2015     4:00 PM 11/20/2015     2:00 PM 8/3/2022     3:20 PM   PHQ   PHQ-9 Total Score 4 2 2   Q9: Thoughts of better off  "dead/self-harm past 2 weeks Not at all Not at all Not at all          No data to display                      Objective  /80 (BP Location: Right arm, Patient Position: Sitting, Cuff Size: Adult Regular)   Pulse 75   Temp 96.9  F (36.1  C) (Temporal)   Resp 18   Ht 1.727 m (5' 8\")   Wt 103.4 kg (228 lb)   LMP  (LMP Unknown)   SpO2 95%   BMI 34.67 kg/m     Physical Exam   GENERAL: alert and no distress  NECK: no adenopathy, no asymmetry, masses, or scars  RESP: lungs clear to auscultation - no rales, rhonchi or wheezes  CV: regular rates and rhythm, no murmur, click or rub, peripheral pulses strong, and no peripheral edema  Diabetic foot exam: normal DP and PT pulses, no trophic changes or ulcerative lesions, and normal sensory exam    Results for orders placed or performed during the hospital encounter of 05/14/24   XR Lumbar Spine 2/3 Views     Status: None    Narrative    Exam: XR LUMBAR SPINE 2/3 VIEWS     History:Female, age 68 years, Chronic bilateral low back pain without  sciatica; Chronic bilateral low back pain without sciatica    Comparison:  No relevant prior imaging.    Technique: Three views are submitted.    Findings: Bones are normally mineralized. No evidence of acute or  subacute fracture.  Vertebral bodies and posterior elements are well  aligned.           Impression    Impression:  No evidence of acute or subacute bony abnormality.     Multilevel degenerative change, most severe in the mid and lower  lumbar facet joints.    ALEKSANDRA ASH MD         SYSTEM ID:  [id number]   Results for orders placed or performed during the hospital encounter of 05/14/24   XR Thoracic Spine 2 Views     Status: None    Narrative    Exam: XR THORACIC SPINE 2 VIEWS     History:Female, age 68 years, Chronic bilateral thoracic back pain;  Chronic bilateral thoracic back pain    Comparison:  No relevant prior imaging.    Technique: Two views are submitted    Findings: Bones are osteopenic. No evidence of " acute or subacute  fracture.  Vertebral bodies and posterior elements are well aligned.           Impression    Impression:  No evidence of acute or subacute bony abnormality.     Generalized osteopenia and moderate multilevel degenerative change of  the thoracic spine.    ALEKSANDRA ASH MD         SYSTEM ID:  [id number]   Results for orders placed or performed in visit on 05/14/24   Albumin Random Urine Quantitative with Creat Ratio     Status: None   Result Value Ref Range    Creatinine Urine mg/dL 89.7 mg/dL    Albumin Urine mg/L <12.0 mg/L    Albumin Urine mg/g Cr     Hemoglobin A1c     Status: Abnormal   Result Value Ref Range    Hemoglobin A1C 6.6 (H) 4.0 - 6.2 %   Comprehensive Metabolic Panel     Status: Abnormal   Result Value Ref Range    Sodium 141 135 - 145 mmol/L    Potassium 3.7 3.4 - 5.3 mmol/L    Carbon Dioxide (CO2) 28 22 - 29 mmol/L    Anion Gap 11 7 - 15 mmol/L    Urea Nitrogen 15.5 8.0 - 23.0 mg/dL    Creatinine 0.73 0.51 - 0.95 mg/dL    GFR Estimate 89 >60 mL/min/1.73m2    Calcium 9.6 8.8 - 10.2 mg/dL    Chloride 102 98 - 107 mmol/L    Glucose 136 (H) 70 - 99 mg/dL    Alkaline Phosphatase 78 40 - 150 U/L    AST 40 0 - 45 U/L    ALT 40 0 - 50 U/L    Protein Total 8.1 6.4 - 8.3 g/dL    Albumin 4.7 3.5 - 5.2 g/dL    Bilirubin Total 0.5 <=1.2 mg/dL   EKG 12-lead complete w/read - Clinics     Status: None (Preliminary result)   Result Value Ref Range    Systolic Blood Pressure  mmHg    Diastolic Blood Pressure  mmHg    Ventricular Rate 65 BPM    Atrial Rate 65 BPM    IA Interval 184 ms    QRS Duration 76 ms     ms    QTc 430 ms    P Axis 22 degrees    R AXIS 38 degrees    T Axis 47 degrees    Interpretation ECG       Sinus rhythm  Normal ECG  No previous ECGs available

## 2024-05-14 NOTE — LETTER
My Asthma Action Plan    Name: Neelam Miller   YOB: 1955  Date: 5/14/2024   My doctor: KANDI MASTERS MD   My clinic: [facility]        My Rescue Medicine:   Albuterol inhaler (Proair/Ventolin/Proventil HFA)  2-4 puffs EVERY 4 HOURS as needed. Use a spacer if recommended by your provider.   My Asthma Severity:   Moderate Persistent  Know your asthma triggers:              GREEN ZONE   Good Control  I feel good  No cough or wheeze  Can work, sleep and play without asthma symptoms       Take your asthma control medicine every day.     If exercise triggers your asthma, take your rescue medication  15 minutes before exercise or sports, and  During exercise if you have asthma symptoms  Spacer to use with inhaler: If you have a spacer, make sure to use it with your inhaler             YELLOW ZONE Getting Worse  I have ANY of these:  I do not feel good  Cough or wheeze  Chest feels tight  Wake up at night   Keep taking your Green Zone medications  Start taking your rescue medicine:  every 20 minutes for up to 1 hour. Then every 4 hours for 24-48 hours.  If you stay in the Yellow Zone for more than 12-24 hours, contact your doctor.  If you do not return to the Green Zone in 12-24 hours or you get worse, start taking your oral steroid medicine if prescribed by your provider.           RED ZONE Medical Alert - Get Help  I have ANY of these:  I feel awful  Medicine is not helping  Breathing getting harder  Trouble walking or talking  Nose opens wide to breathe       Take your rescue medicine NOW  If your provider has prescribed an oral steroid medicine, start taking it NOW  Call your doctor NOW  If you are still in the Red Zone after 20 minutes and you have not reached your doctor:  Take your rescue medicine again and  Call 911 or go to the emergency room right away    See your regular doctor within 2 weeks of an Emergency Room or Urgent Care visit for follow-up treatment.           Annual Reminders:  Meet with Asthma Educator,  Flu Shot in the Fall, consider Pneumonia Vaccination for patients with asthma (aged 19 and older).    Pharmacy: [facility] [id number] [place] [address]    Electronically signed by KANDI MASTERS MD   Date: 05/14/24                    Asthma Triggers  How To Control Things That Make Your Asthma Worse    Triggers are things that make your asthma worse.  Look at the list below to help you find your triggers and   what you can do about them. You can help prevent asthma flare-ups by staying away from your triggers.      Trigger                                                          What you can do   Cigarette Smoke  Tobacco smoke can make asthma worse. Do not allow smoking in your home, car or around you.  Be sure no one smokes at a child s day care or school.  If you smoke, ask your health care provider for ways to help you quit.  Ask family members to quit too.  Ask your health care provider for a referral to Quit Plan to help you quit smoking, or call 7-207-499-PLAN.     Colds, Flu, Bronchitis  These are common triggers of asthma. Wash your hands often.  Don t touch your eyes, nose or mouth.  Get a flu shot every year.     Dust Mites  These are tiny bugs that live in cloth or carpet. They are too small to see. Wash sheets and blankets in hot water every week.   Encase pillows and mattress in dust mite proof covers.  Avoid having carpet if you can. If you have carpet, vacuum weekly.   Use a dust mask and HEPA vacuum.   Pollen and Outdoor Mold  Some people are allergic to trees, grass, or weed pollen, or molds. Try to keep your windows closed.  Limit time out doors when pollen count is high.   Ask you health care provider about taking medicine during allergy season.     Animal Dander  Some people are allergic to skin flakes, urine or saliva from pets with fur or feathers. Keep pets with fur or feathers out of your home.    If you  can t keep the pet outdoors, then keep the pet out of your bedroom.  Keep the bedroom door closed.  Keep pets off cloth furniture and away from stuffed toys.     Mice, Rats, and Cockroaches  Some people are allergic to the waste from these pests.   Cover food and garbage.  Clean up spills and food crumbs.  Store grease in the refrigerator.   Keep food out of the bedroom.   Indoor Mold  This can be a trigger if your home has high moisture. Fix leaking faucets, pipes, or other sources of water.   Clean moldy surfaces.  Dehumidify basement if it is damp and smelly.   Smoke, Strong Odors, and Sprays  These can reduce air quality. Stay away from strong odors and sprays, such as perfume, powder, hair spray, paints, smoke incense, paint, cleaning products, candles and new carpet.   Exercise or Sports  Some people with asthma have this trigger. Be active!  Ask your doctor about taking medicine before sports or exercise to prevent symptoms.    Warm up for 5-10 minutes before and after sports or exercise.     Other Triggers of Asthma  Cold air:  Cover your nose and mouth with a scarf.  Sometimes laughing or crying can be a trigger.  Some medicines and food can trigger asthma.

## 2024-05-14 NOTE — NURSING NOTE
"Chief Complaint   Patient presents with    Recheck Medication    Diabetes     DM check       Initial /80 (BP Location: Right arm, Patient Position: Sitting, Cuff Size: Adult Regular)   Pulse 75   Temp 96.9  F (36.1  C) (Temporal)   Resp 18   Ht 1.727 m (5' 8\")   Wt 103.4 kg (228 lb)   LMP  (LMP Unknown)   SpO2 95%   BMI 34.67 kg/m   Estimated body mass index is 34.67 kg/m  as calculated from the following:    Height as of this encounter: 1.727 m (5' 8\").    Weight as of this encounter: 103.4 kg (228 lb).    Medication Review: complete    Previous A1C is at goal of <8  Lab Results   Component Value Date    A1C 6.9 09/08/2023    A1C 6.4 06/16/2023    A1C 6.4 12/12/2022    A1C 7.1 05/27/2022    A1C 6.7 11/03/2021    A1C 6.9 04/06/2021    A1C 6.8 10/02/2020    A1C 6.7 03/10/2020    A1C 6.7 12/17/2019    A1C 6.3 09/11/2019     Urine microalbumin:creatine: %/14/24  Foot exam : 8/22/23  Eye exam : 11/14/23    Tobacco User : No  Patient is on a daily aspirin  Patient is on a Statin.  Blood pressure today of:     BP Readings from Last 1 Encounters:   05/14/24 132/80      is at the goal of <139/89 for diabetics.          The next two questions are to help us understand your food security.  If you are feeling you need any assistance in this area, we have resources available to support you today.          5/8/2024   SDOH- Food Insecurity   Within the past 12 months, did you worry that your food would run out before you got money to buy more? N   Within the past 12 months, did the food you bought just not last and you didn t have money to get more? N         Health Care Directive:  Patient has a Health Care Directive on file      Patricia Mejia LPN      "

## 2024-05-19 LAB
ATRIAL RATE - MUSE: 65 BPM
DIASTOLIC BLOOD PRESSURE - MUSE: NORMAL MMHG
INTERPRETATION ECG - MUSE: NORMAL
P AXIS - MUSE: 22 DEGREES
PR INTERVAL - MUSE: 184 MS
QRS DURATION - MUSE: 76 MS
QT - MUSE: 414 MS
QTC - MUSE: 430 MS
R AXIS - MUSE: 38 DEGREES
SYSTOLIC BLOOD PRESSURE - MUSE: NORMAL MMHG
T AXIS - MUSE: 47 DEGREES
VENTRICULAR RATE- MUSE: 65 BPM

## 2024-05-28 ENCOUNTER — DOCUMENTATION ONLY (OUTPATIENT)
Dept: OTHER | Facility: CLINIC | Age: 69
End: 2024-05-28
Payer: COMMERCIAL

## 2024-05-29 ENCOUNTER — MYC MEDICAL ADVICE (OUTPATIENT)
Dept: FAMILY MEDICINE | Facility: OTHER | Age: 69
End: 2024-05-29
Payer: COMMERCIAL

## 2024-05-30 NOTE — TELEPHONE ENCOUNTER
5/14/24  Mounjaro ordered and stuck in system.     Released to [facility].      Patient update on MyChart.    Martine Castañeda RN on 5/30/2024 at 12:30 PM

## 2024-06-10 ENCOUNTER — LAB (OUTPATIENT)
Dept: LAB | Facility: OTHER | Age: 69
End: 2024-06-10
Payer: COMMERCIAL

## 2024-06-10 DIAGNOSIS — E03.9 HYPOTHYROIDISM: ICD-10-CM

## 2024-06-10 DIAGNOSIS — E34.9 ENDOCRINE PROBLEM: Primary | ICD-10-CM

## 2024-06-10 DIAGNOSIS — E78.5 HYPERLIPIDEMIA: ICD-10-CM

## 2024-06-10 DIAGNOSIS — E03.9 ACQUIRED HYPOTHYROIDISM: ICD-10-CM

## 2024-06-10 DIAGNOSIS — R73.09 IMPAIRED GLUCOSE TOLERANCE TEST: ICD-10-CM

## 2024-06-10 DIAGNOSIS — R53.83 FATIGUE: ICD-10-CM

## 2024-06-10 LAB
ALBUMIN SERPL BCG-MCNC: 4.6 G/DL (ref 3.5–5.2)
ALP SERPL-CCNC: 78 U/L (ref 40–150)
ALT SERPL W P-5'-P-CCNC: 40 U/L (ref 0–50)
ANION GAP SERPL CALCULATED.3IONS-SCNC: 9 MMOL/L (ref 7–15)
AST SERPL W P-5'-P-CCNC: 38 U/L (ref 0–45)
BILIRUB SERPL-MCNC: 0.6 MG/DL
BUN SERPL-MCNC: 11.8 MG/DL (ref 8–23)
CALCIUM SERPL-MCNC: 9.8 MG/DL (ref 8.8–10.2)
CHLORIDE SERPL-SCNC: 102 MMOL/L (ref 98–107)
CREAT SERPL-MCNC: 0.75 MG/DL (ref 0.51–0.95)
CRP SERPL HS-MCNC: 1.59 MG/L
DEPRECATED HCO3 PLAS-SCNC: 28 MMOL/L (ref 22–29)
EGFRCR SERPLBLD CKD-EPI 2021: 85 ML/MIN/1.73M2
GLUCOSE SERPL-MCNC: 104 MG/DL (ref 70–99)
HBA1C MFR BLD: 6.5 % (ref 4–6.2)
INSULIN SERPL-ACNC: 23.4 UU/ML (ref 2.6–24.9)
POTASSIUM SERPL-SCNC: 4.1 MMOL/L (ref 3.4–5.3)
PROT SERPL-MCNC: 8 G/DL (ref 6.4–8.3)
SHBG SERPL-SCNC: 28 NMOL/L (ref 30–135)
SODIUM SERPL-SCNC: 139 MMOL/L (ref 135–145)
T3FREE SERPL-MCNC: 2.5 PG/ML (ref 2–4.4)
T4 FREE SERPL-MCNC: 0.96 NG/DL (ref 0.9–1.7)
TSH SERPL DL<=0.005 MIU/L-ACNC: 0.18 UIU/ML (ref 0.3–4.2)

## 2024-06-10 PROCEDURE — 84481 FREE ASSAY (FT-3): CPT | Mod: ZL

## 2024-06-10 PROCEDURE — 83525 ASSAY OF INSULIN: CPT | Mod: ZL

## 2024-06-10 PROCEDURE — 86141 C-REACTIVE PROTEIN HS: CPT | Mod: ZL

## 2024-06-10 PROCEDURE — 83036 HEMOGLOBIN GLYCOSYLATED A1C: CPT | Mod: ZL

## 2024-06-10 PROCEDURE — 36415 COLL VENOUS BLD VENIPUNCTURE: CPT | Mod: ZL

## 2024-06-10 PROCEDURE — 84270 ASSAY OF SEX HORMONE GLOBUL: CPT | Mod: ZL

## 2024-06-10 PROCEDURE — 84439 ASSAY OF FREE THYROXINE: CPT | Mod: ZL

## 2024-06-10 PROCEDURE — 84443 ASSAY THYROID STIM HORMONE: CPT | Mod: ZL

## 2024-06-10 PROCEDURE — 80053 COMPREHEN METABOLIC PANEL: CPT | Mod: ZL

## 2024-06-10 PROCEDURE — 82627 DEHYDROEPIANDROSTERONE: CPT | Mod: ZL

## 2024-06-10 PROCEDURE — 84403 ASSAY OF TOTAL TESTOSTERONE: CPT | Mod: ZL

## 2024-06-11 LAB — DHEA-S SERPL-MCNC: 41 UG/DL (ref 35–430)

## 2024-06-14 DIAGNOSIS — E11.9 TYPE 2 DIABETES MELLITUS WITHOUT COMPLICATION, WITHOUT LONG-TERM CURRENT USE OF INSULIN (H): ICD-10-CM

## 2024-06-15 LAB
TESTOST FREE SERPL-MCNC: 0.2 NG/DL
TESTOST SERPL-MCNC: 10 NG/DL (ref 8–60)

## 2024-06-16 RX ORDER — BLOOD SUGAR DIAGNOSTIC
STRIP MISCELLANEOUS
Qty: 100 STRIP | Refills: 1 | Status: SHIPPED | OUTPATIENT
Start: 2024-06-16

## 2024-06-18 ENCOUNTER — HOSPITAL ENCOUNTER (OUTPATIENT)
Dept: BONE DENSITY | Facility: OTHER | Age: 69
Discharge: HOME | End: 2024-06-18
Attending: FAMILY MEDICINE | Admitting: FAMILY MEDICINE
Payer: COMMERCIAL

## 2024-06-18 DIAGNOSIS — M85.88 OSTEOPENIA OF SPINE: ICD-10-CM

## 2024-06-18 PROCEDURE — 77080 DXA BONE DENSITY AXIAL: CPT

## 2024-06-24 ENCOUNTER — MYC MEDICAL ADVICE (OUTPATIENT)
Dept: FAMILY MEDICINE | Facility: OTHER | Age: 69
End: 2024-06-24
Payer: COMMERCIAL

## 2024-06-24 VITALS — WEIGHT: 219.4 LBS | BODY MASS INDEX: 33.36 KG/M2

## 2024-06-24 DIAGNOSIS — E11.9 TYPE 2 DIABETES MELLITUS WITHOUT COMPLICATION, WITHOUT LONG-TERM CURRENT USE OF INSULIN (H): Primary | ICD-10-CM

## 2024-06-24 DIAGNOSIS — E66.01 MORBID OBESITY (H): ICD-10-CM

## 2024-06-24 NOTE — TELEPHONE ENCOUNTER
Lab Results   Component Value Date    A1C 6.5 06/10/2024    A1C 6.6 05/14/2024    A1C 6.9 09/08/2023    A1C 6.4 06/16/2023    A1C 6.4 12/12/2022    A1C 6.9 04/06/2021    A1C 6.8 10/02/2020    A1C 6.7 03/10/2020    A1C 6.7 12/17/2019    A1C 6.3 09/11/2019

## 2024-07-03 ENCOUNTER — MEDICAL CORRESPONDENCE (OUTPATIENT)
Dept: HEALTH INFORMATION MANAGEMENT | Facility: OTHER | Age: 69
End: 2024-07-03
Payer: COMMERCIAL

## 2024-07-24 DIAGNOSIS — E11.9 TYPE 2 DIABETES MELLITUS WITHOUT COMPLICATION, WITHOUT LONG-TERM CURRENT USE OF INSULIN (H): ICD-10-CM

## 2024-07-25 RX ORDER — HYDROCHLOROTHIAZIDE 25 MG/1
TABLET ORAL
Qty: 90 TABLET | Refills: 3 | Status: SHIPPED | OUTPATIENT
Start: 2024-07-25

## 2024-07-25 NOTE — TELEPHONE ENCOUNTER
[facility] sent Rx request for the following:      Requested Prescriptions   Pending Prescriptions Disp Refills    hydrochlorothiazide (HYDRODIURIL) 25 MG tablet [Pharmacy Med Name: HYDROCHLOROTHIAZIDE 25 MG TAB] 90 tablet 3     Sig: TAKE 1 TABLET BY MOUTH EVERY DAY       Diuretics (Including Combos) Protocol Failed - 7/25/2024 11:50 AM        Failed - Medication indicated for associated diagnosis     Medication is associated with one or more of the following diagnoses:     Edema   Hypertension   Heart Failure   Meniere's Disease         Last Prescription Date:   7/28/23  Last Fill Qty/Refills:         90, R-3    Last Office Visit:              5/14/24   Future Office visit:                Routing refill request to provider for review/approval   Minal Tay RN on 7/25/2024 at 11:54 AM

## 2024-07-31 ENCOUNTER — MYC MEDICAL ADVICE (OUTPATIENT)
Dept: FAMILY MEDICINE | Facility: OTHER | Age: 69
End: 2024-07-31
Payer: COMMERCIAL

## 2024-07-31 VITALS — BODY MASS INDEX: 32.9 KG/M2 | WEIGHT: 216.4 LBS

## 2024-07-31 DIAGNOSIS — E11.9 TYPE 2 DIABETES MELLITUS WITHOUT COMPLICATION, WITHOUT LONG-TERM CURRENT USE OF INSULIN (H): Primary | ICD-10-CM

## 2024-07-31 DIAGNOSIS — E66.01 MORBID OBESITY (H): ICD-10-CM

## 2024-07-31 NOTE — TELEPHONE ENCOUNTER
Prescription sent to pharmacy.    Follow up A1C this fall.    Flori Perry MD       Lab Results   Component Value Date    A1C 6.5 06/10/2024    A1C 6.6 05/14/2024    A1C 6.9 09/08/2023    A1C 6.4 06/16/2023    A1C 6.4 12/12/2022    A1C 6.9 04/06/2021    A1C 6.8 10/02/2020    A1C 6.7 03/10/2020    A1C 6.7 12/17/2019    A1C 6.3 09/11/2019

## 2024-08-02 NOTE — TELEPHONE ENCOUNTER
Called [facility] pharmacy to inquire about the Mounjaro 7.5mg order that was sent to them on 7/31.      They have the order but had told the patient that they just need to order it in.     They should have this in by tomorrow.      Patient update on MyChart.    Martine Castañeda RN on 8/2/2024 at 2:23 PM

## 2024-08-21 ENCOUNTER — HOSPITAL ENCOUNTER (OUTPATIENT)
Dept: MAMMOGRAPHY | Facility: OTHER | Age: 69
Discharge: HOME | End: 2024-08-21
Attending: FAMILY MEDICINE | Admitting: FAMILY MEDICINE
Payer: COMMERCIAL

## 2024-08-21 DIAGNOSIS — Z12.31 VISIT FOR SCREENING MAMMOGRAM: ICD-10-CM

## 2024-08-21 PROCEDURE — 77063 BREAST TOMOSYNTHESIS BI: CPT

## 2024-09-04 ENCOUNTER — LAB (OUTPATIENT)
Dept: LAB | Facility: OTHER | Age: 69
End: 2024-09-04
Attending: FAMILY MEDICINE
Payer: COMMERCIAL

## 2024-09-04 DIAGNOSIS — E03.9 HYPOTHYROIDISM, UNSPECIFIED TYPE: Primary | ICD-10-CM

## 2024-09-04 LAB
T4 FREE SERPL-MCNC: 0.86 NG/DL (ref 0.9–1.7)
TSH SERPL DL<=0.005 MIU/L-ACNC: 0.52 UIU/ML (ref 0.3–4.2)

## 2024-09-04 PROCEDURE — 36415 COLL VENOUS BLD VENIPUNCTURE: CPT | Mod: ZL

## 2024-09-04 PROCEDURE — 84481 FREE ASSAY (FT-3): CPT | Mod: ZL

## 2024-09-04 PROCEDURE — 84443 ASSAY THYROID STIM HORMONE: CPT | Mod: ZL

## 2024-09-04 PROCEDURE — 84439 ASSAY OF FREE THYROXINE: CPT | Mod: ZL

## 2024-09-05 LAB — T3FREE SERPL-MCNC: 2.6 PG/ML (ref 2–4.4)

## 2024-09-24 ENCOUNTER — MYC MEDICAL ADVICE (OUTPATIENT)
Dept: FAMILY MEDICINE | Facility: OTHER | Age: 69
End: 2024-09-24
Payer: COMMERCIAL

## 2024-09-24 VITALS — BODY MASS INDEX: 32.69 KG/M2 | WEIGHT: 215 LBS

## 2024-09-24 DIAGNOSIS — E03.9 ACQUIRED HYPOTHYROIDISM: Primary | ICD-10-CM

## 2024-09-24 RX ORDER — THYROID 120 MG/1
120 TABLET ORAL DAILY
Qty: 90 TABLET | Refills: 4 | Status: SHIPPED | OUTPATIENT
Start: 2024-09-24

## 2024-09-24 NOTE — TELEPHONE ENCOUNTER
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 Wants to change her thyroid medication to NP Thyroid 120. Needs RX.     Currently prescribed [place] Thyroid 180 mg daily and 15 mg MWF.     Ruben'd up order for NP thyroid 120 mg daily.     Updated immunizations in immunization tab. Updated weight in flow sheets.    Routing to provider to review and respond.  Elvira Centeno RN on 9/24/2024 at 12:41 PM

## 2024-10-17 DIAGNOSIS — K21.00 GASTROESOPHAGEAL REFLUX DISEASE WITH ESOPHAGITIS WITHOUT HEMORRHAGE: ICD-10-CM

## 2024-10-23 DIAGNOSIS — E78.5 HYPERLIPIDEMIA, UNSPECIFIED HYPERLIPIDEMIA TYPE: ICD-10-CM

## 2024-10-24 ENCOUNTER — TRANSFERRED RECORDS (OUTPATIENT)
Dept: HEALTH INFORMATION MANAGEMENT | Facility: OTHER | Age: 69
End: 2024-10-24
Payer: COMMERCIAL

## 2024-10-25 RX ORDER — SIMVASTATIN 20 MG
20 TABLET ORAL AT BEDTIME
Qty: 90 TABLET | Refills: 0 | Status: SHIPPED | OUTPATIENT
Start: 2024-10-25

## 2024-10-25 NOTE — TELEPHONE ENCOUNTER
[facility] sent Rx request for the following:      Requested Prescriptions   Pending Prescriptions Disp Refills    simvastatin (ZOCOR) 20 MG tablet [Pharmacy Med Name: SIMVASTATIN 20 MG TABLET] 90 tablet 3     Sig: TAKE 1 TABLET BY MOUTH AT BEDTIME       Antihyperlipidemic agents Failed - 10/25/2024  4:55 PM        Failed - LDL on file in the past 12 months        Passed - Medication is active on med list        Passed - Recent (12 mo) or future (90 days) visit within the authorizing provider's specialty     The patient must have completed an in-person or virtual visit within the past 12 months or has a future visit scheduled within the next 90 days with the authorizing provider s specialty.  Urgent care and e-visits do not quality as an office visit for this protocol.          Passed - Patient is age 18 years or older        Passed - No active pregnancy on record        Passed - No positive pregnancy test in past 12 mos             Last Prescription Date:   8/22/23  Last Fill Qty/Refills:         90, R-3    Last Office Visit:              8/22/23 (last discussed - PCA)   Future Office visit:             Next 5 appointments (look out 90 days)      Ammon 15, 2025 1:20 PM  (Arrive by 1:05 PM)  Annual Wellness Visit with Flori Dash MD  [facility] ([facility] ) [address]  [place] [id number] [phone]         Unable to complete prescription refill per RN Medication Refill Policy.     Davion Rodriguez RN on 10/25/2024 at 4:57 PM

## 2024-10-26 DIAGNOSIS — I10 ESSENTIAL HYPERTENSION: ICD-10-CM

## 2024-10-26 RX ORDER — LISINOPRIL 20 MG/1
20 TABLET ORAL DAILY
Qty: 90 TABLET | Refills: 3 | Status: SHIPPED | OUTPATIENT
Start: 2024-10-26

## 2024-10-27 DIAGNOSIS — E66.01 MORBID OBESITY (H): ICD-10-CM

## 2024-10-27 DIAGNOSIS — E11.9 TYPE 2 DIABETES MELLITUS WITHOUT COMPLICATION, WITHOUT LONG-TERM CURRENT USE OF INSULIN (H): ICD-10-CM

## 2024-10-27 RX ORDER — TIRZEPATIDE 7.5 MG/.5ML
INJECTION, SOLUTION SUBCUTANEOUS
Qty: 3 ML | Refills: 2 | Status: SHIPPED | OUTPATIENT
Start: 2024-10-27

## 2024-12-20 ENCOUNTER — TRANSFERRED RECORDS (OUTPATIENT)
Dept: HEALTH INFORMATION MANAGEMENT | Facility: OTHER | Age: 69
End: 2024-12-20
Payer: COMMERCIAL

## 2024-12-20 LAB — RETINOPATHY: NEGATIVE

## 2025-01-04 ENCOUNTER — HEALTH MAINTENANCE LETTER (OUTPATIENT)
Age: 70
End: 2025-01-04

## 2025-01-10 SDOH — HEALTH STABILITY: PHYSICAL HEALTH: ON AVERAGE, HOW MANY DAYS PER WEEK DO YOU ENGAGE IN MODERATE TO STRENUOUS EXERCISE (LIKE A BRISK WALK)?: 3 DAYS

## 2025-01-10 SDOH — HEALTH STABILITY: PHYSICAL HEALTH: ON AVERAGE, HOW MANY MINUTES DO YOU ENGAGE IN EXERCISE AT THIS LEVEL?: 30 MIN

## 2025-01-10 ASSESSMENT — ASTHMA QUESTIONNAIRES
QUESTION_2 LAST FOUR WEEKS HOW OFTEN HAVE YOU HAD SHORTNESS OF BREATH: NOT AT ALL
QUESTION_1 LAST FOUR WEEKS HOW MUCH OF THE TIME DID YOUR ASTHMA KEEP YOU FROM GETTING AS MUCH DONE AT WORK, SCHOOL OR AT HOME: NONE OF THE TIME
QUESTION_3 LAST FOUR WEEKS HOW OFTEN DID YOUR ASTHMA SYMPTOMS (WHEEZING, COUGHING, SHORTNESS OF BREATH, CHEST TIGHTNESS OR PAIN) WAKE YOU UP AT NIGHT OR EARLIER THAN USUAL IN THE MORNING: NOT AT ALL
ACT_TOTALSCORE: 25
ACT_TOTALSCORE: 25
QUESTION_4 LAST FOUR WEEKS HOW OFTEN HAVE YOU USED YOUR RESCUE INHALER OR NEBULIZER MEDICATION (SUCH AS ALBUTEROL): NOT AT ALL
QUESTION_5 LAST FOUR WEEKS HOW WOULD YOU RATE YOUR ASTHMA CONTROL: COMPLETELY CONTROLLED

## 2025-01-10 ASSESSMENT — SOCIAL DETERMINANTS OF HEALTH (SDOH): HOW OFTEN DO YOU GET TOGETHER WITH FRIENDS OR RELATIVES?: THREE TIMES A WEEK

## 2025-01-15 ENCOUNTER — OFFICE VISIT (OUTPATIENT)
Dept: FAMILY MEDICINE | Facility: OTHER | Age: 70
End: 2025-01-15
Attending: FAMILY MEDICINE
Payer: COMMERCIAL

## 2025-01-15 VITALS
SYSTOLIC BLOOD PRESSURE: 128 MMHG | OXYGEN SATURATION: 95 % | HEIGHT: 68 IN | BODY MASS INDEX: 33.8 KG/M2 | HEART RATE: 71 BPM | RESPIRATION RATE: 16 BRPM | WEIGHT: 223 LBS | DIASTOLIC BLOOD PRESSURE: 80 MMHG | TEMPERATURE: 97.4 F

## 2025-01-15 DIAGNOSIS — E11.9 TYPE 2 DIABETES MELLITUS WITHOUT COMPLICATION, WITHOUT LONG-TERM CURRENT USE OF INSULIN (H): ICD-10-CM

## 2025-01-15 DIAGNOSIS — E03.9 ACQUIRED HYPOTHYROIDISM: ICD-10-CM

## 2025-01-15 DIAGNOSIS — E66.01 MORBID OBESITY (H): ICD-10-CM

## 2025-01-15 DIAGNOSIS — B37.9 YEAST INFECTION: ICD-10-CM

## 2025-01-15 DIAGNOSIS — I10 ESSENTIAL HYPERTENSION: Primary | ICD-10-CM

## 2025-01-15 DIAGNOSIS — Z00.00 ENCOUNTER FOR MEDICARE ANNUAL WELLNESS EXAM: Primary | ICD-10-CM

## 2025-01-15 DIAGNOSIS — Z12.11 COLON CANCER SCREENING: ICD-10-CM

## 2025-01-15 DIAGNOSIS — E78.5 HYPERLIPIDEMIA, UNSPECIFIED HYPERLIPIDEMIA TYPE: ICD-10-CM

## 2025-01-15 DIAGNOSIS — I10 ESSENTIAL HYPERTENSION: ICD-10-CM

## 2025-01-15 DIAGNOSIS — K21.00 GASTROESOPHAGEAL REFLUX DISEASE WITH ESOPHAGITIS WITHOUT HEMORRHAGE: ICD-10-CM

## 2025-01-15 LAB
ALBUMIN SERPL BCG-MCNC: 4.7 G/DL (ref 3.5–5.2)
ALBUMIN UR-MCNC: NEGATIVE MG/DL
ALP SERPL-CCNC: 75 U/L (ref 40–150)
ALT SERPL W P-5'-P-CCNC: 42 U/L (ref 0–50)
AMORPH CRY #/AREA URNS HPF: ABNORMAL /HPF
ANION GAP SERPL CALCULATED.3IONS-SCNC: 11 MMOL/L (ref 7–15)
APPEARANCE UR: ABNORMAL
AST SERPL W P-5'-P-CCNC: 35 U/L (ref 0–45)
BILIRUB SERPL-MCNC: 0.4 MG/DL
BILIRUB UR QL STRIP: NEGATIVE
BUN SERPL-MCNC: 16.1 MG/DL (ref 8–23)
CALCIUM SERPL-MCNC: 9.9 MG/DL (ref 8.8–10.4)
CHLORIDE SERPL-SCNC: 103 MMOL/L (ref 98–107)
CHOLEST SERPL-MCNC: 160 MG/DL
COLOR UR AUTO: YELLOW
CREAT SERPL-MCNC: 0.71 MG/DL (ref 0.51–0.95)
CREAT UR-MCNC: 60.5 MG/DL
EGFRCR SERPLBLD CKD-EPI 2021: >90 ML/MIN/1.73M2
EST. AVERAGE GLUCOSE BLD GHB EST-MCNC: 123 MG/DL
FASTING STATUS PATIENT QL REPORTED: ABNORMAL
FASTING STATUS PATIENT QL REPORTED: NORMAL
GLUCOSE SERPL-MCNC: 97 MG/DL (ref 70–99)
GLUCOSE UR STRIP-MCNC: NEGATIVE MG/DL
HBA1C MFR BLD: 5.9 %
HCO3 SERPL-SCNC: 29 MMOL/L (ref 22–29)
HDLC SERPL-MCNC: 40 MG/DL
HGB UR QL STRIP: NEGATIVE
KETONES UR STRIP-MCNC: NEGATIVE MG/DL
LDLC SERPL CALC-MCNC: 81 MG/DL
LEUKOCYTE ESTERASE UR QL STRIP: NEGATIVE
MICROALBUMIN UR-MCNC: <12 MG/L
MICROALBUMIN/CREAT UR: NORMAL MG/G{CREAT}
NITRATE UR QL: NEGATIVE
NONHDLC SERPL-MCNC: 120 MG/DL
PH UR STRIP: 7.5 [PH] (ref 5–9)
POTASSIUM SERPL-SCNC: 3.7 MMOL/L (ref 3.4–5.3)
PROT SERPL-MCNC: 8.1 G/DL (ref 6.4–8.3)
RBC URINE: 0 /HPF
SODIUM SERPL-SCNC: 143 MMOL/L (ref 135–145)
SP GR UR STRIP: 1.01 (ref 1–1.03)
T4 FREE SERPL-MCNC: 1.24 NG/DL (ref 0.9–1.7)
TRIGL SERPL-MCNC: 193 MG/DL
TSH SERPL DL<=0.005 MIU/L-ACNC: 0.03 UIU/ML (ref 0.3–4.2)
UROBILINOGEN UR STRIP-MCNC: NORMAL MG/DL
WBC URINE: <1 /HPF

## 2025-01-15 PROCEDURE — 81001 URINALYSIS AUTO W/SCOPE: CPT | Mod: ZL | Performed by: FAMILY MEDICINE

## 2025-01-15 PROCEDURE — 84443 ASSAY THYROID STIM HORMONE: CPT | Mod: ZL | Performed by: FAMILY MEDICINE

## 2025-01-15 PROCEDURE — G2211 COMPLEX E/M VISIT ADD ON: HCPCS | Performed by: FAMILY MEDICINE

## 2025-01-15 PROCEDURE — 82043 UR ALBUMIN QUANTITATIVE: CPT | Mod: ZL | Performed by: FAMILY MEDICINE

## 2025-01-15 PROCEDURE — 83036 HEMOGLOBIN GLYCOSYLATED A1C: CPT | Mod: ZL | Performed by: FAMILY MEDICINE

## 2025-01-15 PROCEDURE — 99214 OFFICE O/P EST MOD 30 MIN: CPT | Mod: 25 | Performed by: FAMILY MEDICINE

## 2025-01-15 PROCEDURE — G0439 PPPS, SUBSEQ VISIT: HCPCS | Performed by: FAMILY MEDICINE

## 2025-01-15 PROCEDURE — 82570 ASSAY OF URINE CREATININE: CPT | Mod: ZL | Performed by: FAMILY MEDICINE

## 2025-01-15 PROCEDURE — G0463 HOSPITAL OUTPT CLINIC VISIT: HCPCS | Performed by: FAMILY MEDICINE

## 2025-01-15 PROCEDURE — 99207 PR FOOT EXAM NO CHARGE: CPT | Performed by: FAMILY MEDICINE

## 2025-01-15 PROCEDURE — 36415 COLL VENOUS BLD VENIPUNCTURE: CPT | Mod: ZL | Performed by: FAMILY MEDICINE

## 2025-01-15 PROCEDURE — 80061 LIPID PANEL: CPT | Mod: ZL | Performed by: FAMILY MEDICINE

## 2025-01-15 PROCEDURE — 84439 ASSAY OF FREE THYROXINE: CPT | Mod: ZL | Performed by: FAMILY MEDICINE

## 2025-01-15 PROCEDURE — 80053 COMPREHEN METABOLIC PANEL: CPT | Mod: ZL | Performed by: FAMILY MEDICINE

## 2025-01-15 RX ORDER — HYDROCHLOROTHIAZIDE 25 MG/1
25 TABLET ORAL DAILY
Qty: 90 TABLET | Refills: 4 | Status: SHIPPED | OUTPATIENT
Start: 2025-01-15

## 2025-01-15 RX ORDER — THYROID 30 MG/1
30 TABLET ORAL 2 TIMES DAILY
Qty: 180 TABLET | Refills: 4 | Status: SHIPPED | OUTPATIENT
Start: 2025-01-15

## 2025-01-15 RX ORDER — FLUCONAZOLE 100 MG/1
100 TABLET ORAL PRN
Qty: 90 TABLET | Refills: 4 | Status: SHIPPED | OUTPATIENT
Start: 2025-01-15

## 2025-01-15 RX ORDER — LISINOPRIL 20 MG/1
20 TABLET ORAL DAILY
Qty: 90 TABLET | Refills: 3 | Status: SHIPPED | OUTPATIENT
Start: 2025-01-15

## 2025-01-15 RX ORDER — SIMVASTATIN 20 MG
20 TABLET ORAL AT BEDTIME
Qty: 90 TABLET | Refills: 4 | Status: SHIPPED | OUTPATIENT
Start: 2025-01-15

## 2025-01-15 RX ORDER — LEVOTHYROXINE, LIOTHYRONINE 9.5; 2.25 UG/1; UG/1
TABLET ORAL
COMMUNITY
Start: 2024-12-20 | End: 2025-01-15

## 2025-01-15 RX ORDER — THYROID 90 MG/1
1 TABLET ORAL DAILY
Qty: 90 TABLET | Refills: 4 | Status: SHIPPED | OUTPATIENT
Start: 2025-01-15

## 2025-01-15 RX ORDER — LEVOTHYROXINE, LIOTHYRONINE 9.5; 2.25 UG/1; UG/1
15 TABLET ORAL DAILY
Qty: 90 TABLET | Refills: 4 | Status: SHIPPED | OUTPATIENT
Start: 2025-01-15 | End: 2025-01-15

## 2025-01-15 ASSESSMENT — PAIN SCALES - GENERAL: PAINLEVEL_OUTOF10: NO PAIN (0)

## 2025-01-15 NOTE — NURSING NOTE
"Chief Complaint   Patient presents with    Medicare Visit     Annual well visit       Initial /80 (BP Location: Right arm, Patient Position: Sitting, Cuff Size: Adult Regular)   Pulse 71   Temp 97.4  F (36.3  C) (Temporal)   Resp 16   Ht 1.727 m (5' 8\")   Wt 101.2 kg (223 lb)   LMP  (LMP Unknown)   SpO2 95%   Breastfeeding No   BMI 33.91 kg/m   Estimated body mass index is 33.91 kg/m  as calculated from the following:    Height as of this encounter: 1.727 m (5' 8\").    Weight as of this encounter: 101.2 kg (223 lb).    Medication Review: complete    The next two questions are to help us understand your food security.  If you are feeling you need any assistance in this area, we have resources available to support you today.          1/10/2025   SDOH- Food Insecurity   Within the past 12 months, did you worry that your food would run out before you got money to buy more? N   Within the past 12 months, did the food you bought just not last and you didn t have money to get more? N       Health Care Directive:  Patient has a Health Care Directive on file      Patricia Mejia LPN      "

## 2025-01-15 NOTE — PROGRESS NOTES
"Preventive Care Visit  [facility]  KANDI MASTERS MD, Family Medicine  Ammon 15, 2025  {Provider  Link to SmartSet :377470}    Assessment & Plan       ICD-10-CM    1. Encounter for Medicare annual wellness exam  Z00.00       2. Type 2 diabetes mellitus without complication, without long-term current use of insulin (H)  E11.9 Hemoglobin A1c     Lipid Profile     Comprehensive Metabolic Panel     hydrochlorothiazide (HYDRODIURIL) 25 MG tablet     UA Macroscopic with reflex to Microscopic and Culture     Albumin Random Urine Quantitative with Creat Ratio      3. Essential hypertension  I10 lisinopril (ZESTRIL) 20 MG tablet      4. Morbid obesity (H)  E66.01       5. Hyperlipidemia, unspecified hyperlipidemia type  E78.5 simvastatin (ZOCOR) 20 MG tablet      6. Acquired hypothyroidism  E03.9 TSH     NP THYROID 15 MG tablet      7. Yeast infection  B37.9 fluconazole (DIFLUCAN) 100 MG tablet      8. Gastroesophageal reflux disease with esophagitis without hemorrhage  K21.00 omeprazole (PRILOSEC) 20 MG DR capsule         Hypertension  - at goal, continue same  Hypothyroidism - dose change this past fall and will need follow up  Colonoscopy for colon cancer screening  Recurrent yeast infections - continues with flucanazole prn  Diabetes - noted that wt increased this fall which is surprising being on GLP 1  - increased GLP 1 to 10mg a week, check related labs  Obesity - wt loss would improve hypertension, diabetes control - GLP 1 in addition to activity and dietary  Lipid recheck today and anticipate continuing the same           BMI  Estimated body mass index is 33.91 kg/m  as calculated from the following:    Height as of this encounter: 1.727 m (5' 8\").    Weight as of this encounter: 101.2 kg (223 lb).   Wt Readings from Last 4 Encounters:   01/15/25 101.2 kg (223 lb)   09/24/24 97.5 kg (215 lb)   07/31/24 98.2 kg (216 lb 6.4 oz)   06/24/24 99.5 kg (219 lb 6.4 oz) "         Counseling  Appropriate preventive services were addressed with this patient via screening, questionnaire, or discussion as appropriate for fall prevention, nutrition, physical activity, Tobacco-use cessation, social engagement, weight loss and cognition.  Checklist reviewing preventive services available has been given to the patient.  Reviewed patient's diet, addressing concerns and/or questions.   She is at risk for lack of exercise and has been provided with information to increase physical activity for the benefit of her well-being.   The patient was instructed to see the dentist every 6 months.   Discussed possible causes of fatigue. The patient was provided with written information regarding signs of hearing loss.           Return in about 6 months (around 7/15/2025).    River Richter is a 69 year old, presenting for the following:  Medicare Visit (Annual well visit)        1/15/2025     1:19 PM   Additional Questions   Roomed by MARLENE Navarro  Neelam Miller is a 69 year old female in for AWV  Made it through [employer] in better spirits than she expected.      Diabetes Follow-up - on glp1, mounjaro     How often are you checking your blood sugar? { :627198}  What concerns do you have today about your diabetes? { :702380}   Do you have any of these symptoms? (Select all that apply)  No numbness or tingling in feet.  No redness, sores or blisters on feet.  No complaints of excessive thirst.  No reports of blurry vision.  No significant changes to weight.    Hyperlipidemia Follow-Up    Are you regularly taking any medication or supplement to lower your cholesterol?   Yes- zocor  Are you having muscle aches or other side effects that you think could be caused by your cholesterol lowering medication?  No    Hypertension Follow-up    Do you check your blood pressure regularly outside of the clinic? { :475259}   Are you following a low salt diet? Yes  Are your blood pressures ever more than 140  on the top number (systolic) OR more   than 90 on the bottom number (diastolic), for example 140/90? No    BP Readings from Last 2 Encounters:   01/15/25 128/80   05/14/24 132/80     Hemoglobin A1C (%)   Date Value   06/10/2024 6.5 (H)   05/14/2024 6.6 (H)   04/06/2021 6.9 (H)   10/02/2020 6.8 (H)     LDL Cholesterol Calculated (mg/dL)   Date Value   08/22/2023 64   12/12/2022 77   04/06/2021 62   03/10/2020 77         Hypothyroidism Follow-up  In June her naturopath changed in thyroid medication   Since last visit, patient describes the following symptoms: Weight gain of 5 lbs  ***    Health Care Directive  Patient has a Health Care Directive on file  Advance care planning document is on file and is current.      1/10/2025   General Health   How would you rate your overall physical health? Good   Feel stress (tense, anxious, or unable to sleep) Only a little   (!) STRESS CONCERN      1/10/2025   Nutrition   Diet: Regular (no restrictions)         1/10/2025   Exercise   Days per week of moderate/strenous exercise 3 days   Average minutes spent exercising at this level 30 min         1/10/2025   Social Factors   Frequency of gathering with friends or relatives Three times a week   Worry food won't last until get money to buy more No   Food not last or not have enough money for food? No   Do you have housing? (Housing is defined as stable permanent housing and does not include staying ouside in a car, in a tent, in an abandoned building, in an overnight shelter, or couch-surfing.) Yes   Are you worried about losing your housing? No   Lack of transportation? No   Unable to get utilities (heat,electricity)? No         1/10/2025   Fall Risk   Fallen 2 or more times in the past year? No    No   Trouble with walking or balance? No    No       Multiple values from one day are sorted in reverse-chronological order          1/10/2025   Activities of Daily Living- Home Safety   Needs help with the following daily activites  None of the above   Safety concerns in the home None of the above         1/10/2025   Dental   Dentist two times every year? (!) NO         1/10/2025   Hearing Screening   Hearing concerns? (!) I NEED TO ASK PEOPLE TO SPEAK UP OR REPEAT THEMSELVES.    (!) IT'S HARD TO FOLLOW A CONVERSATION IN A NOISY RESTAURANT OR CROWDED ROOM.       Multiple values from one day are sorted in reverse-chronological order         1/10/2025   Driving Risk Screening   Patient/family members have concerns about driving No         1/10/2025   General Alertness/Fatigue Screening   Have you been more tired than usual lately? (!) YES         1/10/2025   Urinary Incontinence Screening   Bothered by leaking urine in past 6 months No         1/10/2025   TB Screening   Were you born outside of the [place]? No         Today's PHQ-2 Score:       1/14/2025     3:50 PM   PHQ-2 ( 1999 Pf[employer])   Q1: Little interest or pleasure in doing things 0   Q2: Feeling down, depressed or hopeless 0   PHQ-2 Score 0    Q1: Little interest or pleasure in doing things Not at all   Q2: Feeling down, depressed or hopeless Not at all   PHQ-2 Score 0       Patient-reported           1/10/2025   Substance Use   Alcohol more than 3/day or more than 7/wk Not Applicable   Do you have a current opioid prescription? No   How severe/bad is pain from 1 to 10? 2/10   Do you use any other substances recreationally? (!) OTHER     Social History     Tobacco Use    Smoking status: Never     Passive exposure: Never    Smokeless tobacco: Never   Vaping Use    Vaping status: Never Used   Substance Use Topics    Alcohol use: Yes     Comment: rare    Drug use: Never     {Provider  If there are gaps in the social history shown above, please follow the link to update and then refresh the note Link to Social and Substance History :847916}      8/21/2024   LAST FHS-7 RESULTS   1st degree relative breast or ovarian cancer No   Any relative bilateral breast cancer No   Any male have breast cancer  No   Any ONE woman have BOTH breast AND ovarian cancer No   Any woman with breast cancer before 50yrs No   2 or more relatives with breast AND/OR ovarian cancer No   2 or more relatives with breast AND/OR bowel cancer No     {If any of the questions to the FHS7 are answered yes, consider referral for genetic counseling.    Additional indications for genetic referral include personal history of breast or ovarian cancer, genetic mutation in 1st degree relative which increases risk of breast cancer including BRCA1, BRCA2, ALANA, PALB 2, TP53, CHEK2, PTEN, CDH1, STK11 (per ACS) and/or 1st degree relative with history of pancreatic or high-risk prostate cancer (per NCCN):477084}   Mammogram Screening - Mammogram every 1-2 years updated in Health Maintenance based on mutual decision making      History of abnormal Pap smear: No - age 65 or older with adequate negative prior screening test results (3 consecutive negative cytology results, 2 consecutive negative cotesting results, or 2 consecutive negative HrHPV test results within 10 years, with the most recent test occurring within the recommended screening interval for the test used)       ASCVD Risk   The ASCVD Risk score (Mara BERTRAND, et al., 2019) failed to calculate for the following reasons:    The valid total cholesterol range is 130 to 320 mg/dL        {Provider  REQUIRED FOR AWV Use the storyboard to review patient history, after sections have been marked as reviewed, refresh note to capture documentation:145482}  {Provider   REQUIRED AWV use this link to review and update sexual activity history  after section has been marked as reviewed, refresh note to capture documentation:225994}  Reviewed and updated as needed this visit by Provider                    Past Medical History:   Diagnosis Date    Allergic rhinitis     No Comments Provided    Essential (primary) hypertension     No Comments Provided    Hormone replacement therapy (postmenopausal)      5/04, discontinued    Hyperlipidemia     No Comments Provided    Hypothyroidism     No Comments Provided    Major depressive disorder, single episode     03/2012    Obesity     No Comments Provided    Sleep apnea     wears cpap prn    Type 2 diabetes mellitus without complication, without long-term current use of insulin (H) 3/5/2018     Past Surgical History:   Procedure Laterality Date    CHOLECYSTECTOMY      1979    COLONOSCOPY      10/5/04    COLONOSCOPY  03/18/2015    ENDOSCOPY  09/2019    ENDOSCOPY  07/07/2022    gastric polyps    HYSTERECTOMY TOTAL ABDOMINAL, BILATERAL SALPINGO-OOPHORECTOMY, COMBINED  06/18/2002    uterine fibroids    LAPAROSCOPIC BYPASS GASTRIC  12/2007    lap-band    MAMMOPLASTY REDUCTION Bilateral     2/11/10    OTHER SURGICAL HISTORY  1979    CHOLANGIOGRAM TRANSHEPATIC     Current providers sharing in care for this patient include:  Patient Care Team:  Flori Carney MD as PCP - General  Flori Carney MD as Assigned PCP    The following health maintenance items are reviewed in Epic and correct as of today:  Health Maintenance   Topic Date Due    LIPID  08/22/2024    A1C  12/10/2024    COLORECTAL CANCER SCREENING  03/18/2025    MICROALBUMIN  05/14/2025    DIABETIC FOOT EXAM  05/14/2025    ASTHMA ACTION PLAN  05/14/2025    BMP  06/10/2025    ASTHMA CONTROL TEST  07/15/2025    TSH W/FREE T4 REFLEX  09/04/2025    EYE EXAM  12/20/2025    MEDICARE ANNUAL WELLNESS VISIT  01/15/2026    FALL RISK ASSESSMENT  01/15/2026    MAMMO SCREENING  08/21/2026    ADVANCE CARE PLANNING  01/15/2030    DTAP/TDAP/TD IMMUNIZATION (3 - Td or Tdap) 05/27/2032    DEXA  06/18/2039    HEPATITIS C SCREENING  Completed    PHQ-2 (once per calendar year)  Completed    INFLUENZA VACCINE  Completed    Pneumococcal Vaccine: 50+ Years  Completed    ZOSTER IMMUNIZATION  Completed    RSV VACCINE  Completed    COVID-19 Vaccine  Completed    HPV IMMUNIZATION  Aged Out    MENINGITIS IMMUNIZATION  Aged  "Out    RSV MONOCLONAL ANTIBODY  Aged Out         Review of Systems  Vision checks are up to date      Objective    Exam  /80 (BP Location: Right arm, Patient Position: Sitting, Cuff Size: Adult Regular)   Pulse 71   Temp 97.4  F (36.3  C) (Temporal)   Resp 16   Ht 1.727 m (5' 8\")   Wt 101.2 kg (223 lb)   LMP  (LMP Unknown)   SpO2 95%   Breastfeeding No   BMI 33.91 kg/m     Estimated body mass index is 33.91 kg/m  as calculated from the following:    Height as of this encounter: 1.727 m (5' 8\").    Weight as of this encounter: 101.2 kg (223 lb).    Physical Exam  GENERAL: alert and no distress  EYES: Eyes grossly normal to inspection, PERRL and conjunctivae and sclerae normal  HENT: ear canals and TM's normal, nose and mouth without ulcers or lesions  NECK: no adenopathy, no asymmetry, masses, or scars  RESP: lungs clear to auscultation - no rales, rhonchi or wheezes  CV: regular rate and rhythm, normal S1 S2, no S3 or S4, no murmur, click or rub, no peripheral edema  ABDOMEN: soft, nontender, no hepatosplenomegaly, no masses and bowel sounds normal  MS: no gross musculoskeletal defects noted, no edema  SKIN: no suspicious lesions or rashes  NEURO: Normal strength and tone, mentation intact and speech normal  PSYCH: mentation appears normal, affect normal/bright  FOOT EXAM:  ***         1/15/2025   Mini Cog   Clock Draw Score 2 Normal   3 Item Recall 3 objects recalled   Mini Cog Total Score 5     {A Mini-Cog total score of 0-2 suggests the possibility of dementia, score of 3-5 suggests no dementia:763409}        1/15/2025   Vision Screen   Reason Vision Screen Not Completed --       Signed Electronically by: KANDI MASTERS MD  {Email feedback regarding this note to [email or web address]   :[id number]}  "

## 2025-04-07 ENCOUNTER — MYC MEDICAL ADVICE (OUTPATIENT)
Dept: FAMILY MEDICINE | Facility: OTHER | Age: 70
End: 2025-04-07
Payer: COMMERCIAL

## 2025-04-08 NOTE — TELEPHONE ENCOUNTER
Wondering if she should get a MMR booster?     Was born prior to 1958. Thinks she was vaccinated as a kid.     Routing to provider to review and respond.  Elvira Centeno RN on 4/8/2025 at 9:21 AM

## 2025-04-08 NOTE — TELEPHONE ENCOUNTER
"\"For people born before 1958, the CDC does not recommend a vaccine, as they were likely infected in childhood and have presumed protection against measles.\"    That is what I am seeing. I am not seeing any new recommendation on an MMR immunization being administered to seniors.    If she is planning any international travel in the upcoming months, getting a booster could be a consideration due to some recent outbreaks.     Aislinn Bowden NP on 4/8/2025 at 11:14 AM    "

## 2025-05-14 ENCOUNTER — OFFICE VISIT (OUTPATIENT)
Dept: FAMILY MEDICINE | Facility: OTHER | Age: 70
End: 2025-05-14
Attending: FAMILY MEDICINE
Payer: COMMERCIAL

## 2025-05-14 VITALS
SYSTOLIC BLOOD PRESSURE: 130 MMHG | WEIGHT: 220.4 LBS | DIASTOLIC BLOOD PRESSURE: 78 MMHG | RESPIRATION RATE: 18 BRPM | BODY MASS INDEX: 33.51 KG/M2 | OXYGEN SATURATION: 96 % | TEMPERATURE: 98.3 F | HEART RATE: 83 BPM

## 2025-05-14 DIAGNOSIS — L98.0 PYOGENIC GRANULOMA: Primary | ICD-10-CM

## 2025-05-14 DIAGNOSIS — B07.9 VIRAL WARTS, UNSPECIFIED TYPE: ICD-10-CM

## 2025-05-14 PROCEDURE — G0463 HOSPITAL OUTPT CLINIC VISIT: HCPCS

## 2025-05-14 PROCEDURE — 17110 DESTRUCTION B9 LES UP TO 14: CPT | Performed by: FAMILY MEDICINE

## 2025-05-14 ASSESSMENT — PAIN SCALES - GENERAL: PAINLEVEL_OUTOF10: NO PAIN (0)

## 2025-05-14 NOTE — NURSING NOTE
"Chief Complaint   Patient presents with    Derm Problem     Left forearm mole has been changing. Was biopsy prior about 10 years ago and was normal.  Started to grow again last year.         Initial /78 (BP Location: Right arm, Patient Position: Sitting, Cuff Size: Adult Large)   Pulse 83   Temp 98.3  F (36.8  C) (Tympanic)   Resp 18   Wt 100 kg (220 lb 6.4 oz)   LMP  (LMP Unknown)   SpO2 96%   BMI 33.51 kg/m   Estimated body mass index is 33.51 kg/m  as calculated from the following:    Height as of 1/15/25: 1.727 m (5' 8\").    Weight as of this encounter: 100 kg (220 lb 6.4 oz).  Medication Reconciliation: complete    Rosa Purdy LPN    Advance Care Directive reviewed    "

## 2025-05-14 NOTE — PROGRESS NOTES
"  Assessment & Plan       ICD-10-CM    1. Pyogenic granuloma  L98.0 Adult Gen Surg  Referral      2. Viral warts, unspecified type  B07.9           Referred to general surgery for excision of skin lesion, discussed possibility of pyogenic granuloma.    Wart treated with liquid nitrogen.  Patient can ask for second treatment when she presents for skin excision.  If this is not effective, consider topical compounding treatment she can send a message if wanting to pursue this.      BMI  Estimated body mass index is 33.51 kg/m  as calculated from the following:    Height as of 1/15/25: 1.727 m (5' 8\").    Weight as of this encounter: 100 kg (220 lb 6.4 oz).         Subjective   Neelam is a 69 year old, presenting for the following health issues:  Derm Problem (Left forearm mole has been changing. Was biopsy prior about 10 years ago and was normal.  Started to grow again last year.  )        5/14/2025    11:29 AM   Additional Questions   Roomed by Rosa   Accompanied by self     History of Present Illness       Reason for visit:  Mole on arm has changed; intestinal discomfort  Symptoms include:  Cramping  Symptom progression:  Staying the same  Had these symptoms before:  No  What makes it worse:  Not sure  What makes it better:  Not sure   She is taking medications regularly.        Patient has a lesion on her left forearm that has been present for several months.  It is in the area of a previously removed skin lesion, that was not malignant nor premalignant.  She also had some trauma there last fall when her dog scratched the area and it bled.  But has been more raised and irritated in the past couple of months.    She also has a wart on her left thumb that has been present for a while.  She has tried over-the-counter treatment without resolution.          Objective    /78 (BP Location: Right arm, Patient Position: Sitting, Cuff Size: Adult Large)   Pulse 83   Temp 98.3  F (36.8  C) (Tympanic)   Resp " 18   Wt 100 kg (220 lb 6.4 oz)   LMP  (LMP Unknown)   SpO2 96%   BMI 33.51 kg/m    Body mass index is 33.51 kg/m .  Physical Exam  Constitutional:       Appearance: She is well-developed.   HENT:      Right Ear: External ear normal.      Left Ear: External ear normal.   Eyes:      General: No scleral icterus.     Conjunctiva/sclera: Conjunctivae normal.   Cardiovascular:      Rate and Rhythm: Normal rate.   Pulmonary:      Effort: Pulmonary effort is normal. No respiratory distress.   Skin:     Findings: No rash.      Comments: 8 mm raised domed irritated appearing pinkish papule on left forearm.  Wart present on the left thumb.  Wart is treated with 3 freeze thaw cycles using liquid nitrogen.   Neurological:      Mental Status: She is alert.            Signed Electronically by: Layne Wellington MD

## 2025-05-22 ENCOUNTER — OFFICE VISIT (OUTPATIENT)
Dept: SURGERY | Facility: OTHER | Age: 70
End: 2025-05-22
Attending: SURGERY
Payer: COMMERCIAL

## 2025-05-22 VITALS
TEMPERATURE: 96.4 F | SYSTOLIC BLOOD PRESSURE: 138 MMHG | BODY MASS INDEX: 33.37 KG/M2 | DIASTOLIC BLOOD PRESSURE: 88 MMHG | RESPIRATION RATE: 16 BRPM | HEIGHT: 68 IN | OXYGEN SATURATION: 99 % | HEART RATE: 66 BPM | WEIGHT: 220.2 LBS

## 2025-05-22 DIAGNOSIS — L98.9 SKIN LESION OF LEFT ARM: Primary | ICD-10-CM

## 2025-05-22 PROBLEM — E66.811 OBESITY (BMI 30.0-34.9): Status: ACTIVE | Noted: 2025-05-22

## 2025-05-22 PROBLEM — G47.30 SLEEP APNEA: Status: RESOLVED | Noted: 2018-01-19 | Resolved: 2025-05-22

## 2025-05-22 PROBLEM — E66.01 MORBID OBESITY (H): Status: RESOLVED | Noted: 2021-04-12 | Resolved: 2025-05-22

## 2025-05-22 PROBLEM — E66.9 OBESITY: Status: RESOLVED | Noted: 2018-01-19 | Resolved: 2025-05-22

## 2025-05-22 PROCEDURE — G0463 HOSPITAL OUTPT CLINIC VISIT: HCPCS | Mod: 25

## 2025-05-22 PROCEDURE — 11402 EXC TR-EXT B9+MARG 1.1-2 CM: CPT | Performed by: SURGERY

## 2025-05-22 ASSESSMENT — PAIN SCALES - GENERAL: PAINLEVEL_OUTOF10: NO PAIN (0)

## 2025-05-22 NOTE — PATIENT INSTRUCTIONS
Instructions:    Your incision was closed with stitches that will dissolve.   A surgical glue was used to help keep the incision closed. Do not apply any Vaseline, triple antibiotics, bacitracin, or any product on the glue as this may soften and create it to be sticky which may cause skin irritation.  It is ok to get the incision wet in the shower on the day after your procedure. Pat dry the area. Do not rub. Don't soak in a tub, pool or lake for 7 days.   Pathology can take up to 7-10 days to be completed. Once pathology is reviewed by the provider we will give you a call, letter, or MyChart message with the results. You may see the results prior to the provider reviewing them through your MyChart.        Monitor for any signs of infection:     Redness in the area of the wound, particularly if it spreads or forms a red streak  Swelling or warmth in the affected area  Pain or tenderness at or around the site of the wound  Pus forming around or oozing from the wound  Fever  Delayed wound healing    Please call the General Surgery office and ask for a nurse in unit 4S with problems, questions, or concerns at [phone].    General Surgery Nurses    Tamia Zarate LPN

## 2025-05-22 NOTE — PROGRESS NOTES
Procedure Note  Pre/Post Operative Diagnosis:   1.3 cm ( with margins) skin lesion left forearm    Procedure:    Excision    Surgeon: Paulino Carlson MD FACS    Local Anesthesia: 1% lidocaine with0.25%Marcaine with epinephrine    Indication for the procedure:    This is a 69 year old female patient with raised skin lesion.    After explaining the risks to include bleeding, infection, recurrence or need for re-excision,and scarring the patient wished to proceed.    Procedure:   The area was prepped and draped in usual sterile fashion with ChloraPrep . After, adequate local anesthesia,an elliptical skin incision was made to encompass the lesion. Dermis approximated with 3-0 Vicryl sutures.  The skin was closed with 4-0 Monocryl.  A glue and clean dry dressing was applied.    Plan:  Patient will followup if there any problems with the wound including redness or drainage.

## 2025-05-22 NOTE — NURSING NOTE
"Chief Complaint   Patient presents with    Procedure     Granuloma on left arm         Medication reconciliation completed.    FOOD SECURITY SCREENING QUESTIONS:    The next two questions are to help us understand your food security.  If you are feeling you need any assistance in this area, we have resources available to support you today.    Hunger Vital Signs:  Within the past 12 months we worried whether our food would run out before we got money to buy more. Never  Within the past 12 months the food we bought just didn't last and we didn't have money to get more. Never    Initial /88 (BP Location: Left arm, Patient Position: Sitting, Cuff Size: Adult Regular)   Pulse 66   Temp (!) 96.4  F (35.8  C) (Temporal)   Resp 16   Ht 1.727 m (5' 8\")   Wt 99.9 kg (220 lb 3.2 oz)   LMP  (LMP Unknown)   SpO2 99%   BMI 33.48 kg/m   Estimated body mass index is 33.48 kg/m  as calculated from the following:    Height as of this encounter: 1.727 m (5' 8\").    Weight as of this encounter: 99.9 kg (220 lb 3.2 oz).       Chanda Crook LPN .......  5/22/2025  8:38 AM  TIMEOUT    Universal Protocol    A. Pre-procedure verification complete   1-relevant information / documentation available, reviewed and properly matched to the patient; 2-consent accurate and complete, 3-equipment and supplies available    B. Site marking complete   Site marked if not in continuous attendance with patient    C. TIME OUT completed   Time Out was conducted just prior to starting procedure to verify the eight required elements: 1-patient identity, 2-consent accurate and complete, 3-position, 4-correct side/site marked (if applicable), 5-procedure, 6-relevant images / results properly labeled and displayed (if applicable), 7-antibiotics / irrigation fluids (if applicable), 8-safety precautions.    Surgical Nurse  ....................  5/22/2025   8:54 AM   "

## 2025-05-30 ENCOUNTER — RESULTS FOLLOW-UP (OUTPATIENT)
Dept: SURGERY | Facility: CLINIC | Age: 70
End: 2025-05-30

## 2025-05-30 DIAGNOSIS — C43.62 MALIGNANT MELANOMA OF LEFT UPPER EXTREMITY INCLUDING SHOULDER (H): Primary | ICD-10-CM

## 2025-05-30 NOTE — RESULT ENCOUNTER NOTE
Melanoma. 3.6 mm, ulcerated. Margins negative.  Discussed with patient by phone.  Dermatology Consult for skin exam  Oncology Consult  2 cm wide excision and sentinel lymph node biopsy  Follow-up with me to re-check excision site and make arrangements

## 2025-06-02 ENCOUNTER — TELEPHONE (OUTPATIENT)
Dept: DERMATOLOGY | Facility: CLINIC | Age: 70
End: 2025-06-02
Payer: COMMERCIAL

## 2025-06-02 ENCOUNTER — PATIENT OUTREACH (OUTPATIENT)
Dept: ONCOLOGY | Facility: CLINIC | Age: 70
End: 2025-06-02
Payer: COMMERCIAL

## 2025-06-02 ENCOUNTER — TRANSFERRED RECORDS (OUTPATIENT)
Dept: HEALTH INFORMATION MANAGEMENT | Facility: OTHER | Age: 70
End: 2025-06-02
Payer: COMMERCIAL

## 2025-06-02 NOTE — PROGRESS NOTES
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 Carlson on 5/22/25 for a 1.3cm (with margins) skin lesion left forearm. Excision done showing:            Records Location: See Bookmarked material     Records Needed: NA     Additional testing needed prior to consult: NA    Payor: [facility] / Plan: [employer] MEDICARE / Product Type: HMO /     June 2, 2025  Referral received and reviewed.   Sent to NPS to schedule.     Katya ARMIJON, RN, OCN  Oncology Nurse Navigator   [facility]  Cancer Care Service Line   New Patient Hem/Onc Scheduling / Referrals: [phone] (fax: [phone] )

## 2025-06-02 NOTE — TELEPHONE ENCOUNTER
I called and spoke to Neelam. She was recently diagnosed with melanoma and will be seeing surg onc for removal. I scheduled her for a skin check with Dr. Tadeo on 7/31. She is going to check for an appointment closer to where she lives as well. Patient has our direct number in case she needs to cancel.     Melanoma. 3.6 mm, ulcerated. Margins negative.  Discussed with patient by phone.  Dermatology Consult for skin exam  Oncology Consul

## 2025-06-12 NOTE — TELEPHONE ENCOUNTER
RECORDS STATUS - ALL OTHER DIAGNOSIS      RECORDS RECEIVED FROM: [facility]   NOTES STATUS DETAILS   OFFICE NOTE from referring provider Epic 5/22/2025 - Dr. Paulino Carlson   MEDICATION LIST [facility]    LABS     PATHOLOGY REPORTS [facility] 5/22/2025 - [id number]    ANYTHING RELATED TO DIAGNOSIS Epic 1/15/2025

## 2025-06-23 ENCOUNTER — ONCOLOGY VISIT (OUTPATIENT)
Dept: ONCOLOGY | Facility: CLINIC | Age: 70
End: 2025-06-23
Attending: SURGERY
Payer: COMMERCIAL

## 2025-06-23 ENCOUNTER — PRE VISIT (OUTPATIENT)
Dept: ONCOLOGY | Facility: CLINIC | Age: 70
End: 2025-06-23
Payer: COMMERCIAL

## 2025-06-23 ENCOUNTER — PREP FOR PROCEDURE (OUTPATIENT)
Dept: ONCOLOGY | Facility: CLINIC | Age: 70
End: 2025-06-23

## 2025-06-23 VITALS
TEMPERATURE: 97.9 F | WEIGHT: 215.5 LBS | BODY MASS INDEX: 32.66 KG/M2 | HEART RATE: 64 BPM | HEIGHT: 68 IN | RESPIRATION RATE: 18 BRPM | OXYGEN SATURATION: 97 % | DIASTOLIC BLOOD PRESSURE: 90 MMHG | SYSTOLIC BLOOD PRESSURE: 145 MMHG

## 2025-06-23 DIAGNOSIS — C43.62 MALIGNANT MELANOMA OF LEFT UPPER EXTREMITY INCLUDING SHOULDER (H): ICD-10-CM

## 2025-06-23 DIAGNOSIS — C43.62 MALIGNANT MELANOMA OF LEFT UPPER EXTREMITY INCLUDING SHOULDER (H): Primary | ICD-10-CM

## 2025-06-23 PROCEDURE — G0463 HOSPITAL OUTPT CLINIC VISIT: HCPCS | Performed by: SURGERY

## 2025-06-23 PROCEDURE — 99202 OFFICE O/P NEW SF 15 MIN: CPT | Performed by: SURGERY

## 2025-06-23 RX ORDER — OMEGA-3/DHA/EPA/FISH OIL 300-1000MG
3000 CAPSULE ORAL
COMMUNITY

## 2025-06-23 ASSESSMENT — PAIN SCALES - GENERAL: PAINLEVEL_OUTOF10: NO PAIN (0)

## 2025-06-23 NOTE — PROGRESS NOTES
Neelam is a 70-year-old woman with a new diagnosis of melanoma of the left forearm.  She states that many years ago she had a premalignant lesion removed from her left forearm at the same site.  In the fall she noticed growth in this area that continued to progress.  On May 22 she underwent a excisional biopsy which demonstrated 3.6 mm ulcerated melanoma with 10 mitotic figures per millimeter squared tissue.  The margins were narrowly negative.  She is now here to talk about treatment options.  She has had no prior history of skin cancer.  She has no symptoms of other suspicious skin lesions, lymphadenopathy, or distant metastatic disease.  Her past medical history significant for hypothyroidism.  Her family history is negative for melanoma.  On physical examination she appears well.  She has a healed biopsy site oriented along the long axis of the extremity with no residual pigmentation.  She has no epitrochlear or axillar lymphadenopathy.  She has no in-transit disease.    Impression 3.6 mm ulcerated melanoma    Plan: I talked about the various treatment options.  I have recommended wide local excision with a 2 cm margin resection.  Her skin seems relatively pliable and I think we can close this without a skin graft.  I did recommend a sentinel lymph node biopsy.  After surgery I am going to have her see medical oncology to determine whether or not she would need adjuvant immune therapy either with or without a positive sentinel node.  Will tentatively schedule her for a wide local excision and a sentinel lymph node biopsy    Total time of the visit was 22 minutes which included reviewing her pathology and the in person visit and coordinating her care..

## 2025-06-23 NOTE — NURSING NOTE
"Oncology Rooming Note    June 23, 2025 3:21 PM   Neelam Miller is a 70 year old female who presents for:    Chief Complaint   Patient presents with    Oncology Clinic Visit     Malignant melanoma of left upper extremity including shoulder     Initial Vitals: BP (!) 145/90 (BP Location: Right arm, Patient Position: Sitting, Cuff Size: Adult Regular)   Pulse 64   Temp 97.9  F (36.6  C) (Oral)   Resp 18   Ht 1.725 m (5' 7.91\")   Wt 97.8 kg (215 lb 8 oz)   LMP  (LMP Unknown)   SpO2 97%   BMI 32.85 kg/m   Estimated body mass index is 32.85 kg/m  as calculated from the following:    Height as of this encounter: 1.725 m (5' 7.91\").    Weight as of this encounter: 97.8 kg (215 lb 8 oz). Body surface area is 2.16 meters squared.  No Pain (0) Comment: Data Unavailable   No LMP recorded (lmp unknown). Patient has had a hysterectomy.  Allergies reviewed: Yes  Medications reviewed: Yes    Medications: Medication refills not needed today.  Pharmacy name entered into [employer]: [id number] [place] [address]    Frailty Screening:   Is the patient here for a new oncology consult visit in cancer care? 1. Yes. Over the past month, have you experienced difficulty or required a caregiver to assist with:   1. Balance, walking or general mobility (including any falls)? NO  2. Completion of self-care tasks such as bathing, dressing, toileting, grooming/hygiene?  NO  3. Concentration or memory that affects your daily life?  NO     PHQ9:  Did this patient require a PHQ9?: No      Clinical concerns: none.       Noris Jasso LPN            "

## 2025-06-23 NOTE — LETTER
6/23/2025      Neelam Miller  [address] [place] [address]      Dear Colleague,    Thank you for referring your patient, Neelam Miller, to the [facility]. Please see a copy of my visit note below.    Neelam is a 70-year-old woman with a new diagnosis of melanoma of the left forearm.  She states that many years ago she had a premalignant lesion removed from her left forearm at the same site.  In the fall she noticed growth in this area that continued to progress.  On May 22 she underwent a excisional biopsy which demonstrated 3.6 mm ulcerated melanoma with 10 mitotic figures per millimeter squared tissue.  The margins were narrowly negative.  She is now here to talk about treatment options.  She has had no prior history of skin cancer.  She has no symptoms of other suspicious skin lesions, lymphadenopathy, or distant metastatic disease.  Her past medical history significant for hypothyroidism.  Her family history is negative for melanoma.  On physical examination she appears well.  She has a healed biopsy site oriented along the long axis of the extremity with no residual pigmentation.  She has no epitrochlear or axillar lymphadenopathy.  She has no in-transit disease.    Impression 3.6 mm ulcerated melanoma    Plan: I talked about the various treatment options.  I have recommended wide local excision with a 2 cm margin resection.  Her skin seems relatively pliable and I think we can close this without a skin graft.  I did recommend a sentinel lymph node biopsy.  After surgery I am going to have her see medical oncology to determine whether or not she would need adjuvant immune therapy either with or without a positive sentinel node.  Will tentatively schedule her for a wide local excision and a sentinel lymph node biopsy    Total time of the visit was 22 minutes which included reviewing her pathology and the in person visit and coordinating her care..    Again, thank you  for allowing me to participate in the care of your patient.        Sincerely,        Jermaine Aranda MD    Electronically signed

## 2025-06-24 ENCOUNTER — TELEPHONE (OUTPATIENT)
Dept: ONCOLOGY | Facility: CLINIC | Age: 70
End: 2025-06-24
Payer: COMMERCIAL

## 2025-06-24 NOTE — TELEPHONE ENCOUNTER
Incoming call to schedule surgery with Dr. Aranda    Spoke with: Neelam (patient)    Date of Surgery: 7/16/2025    Arrival time Discussed with Patient:  Yes; 8:45 AM check in for Nuc Med; 12:00 PM check in for pre-op.    Location of surgery: [facility] [facility]     Pre-Op H&P: PCP within 30 days of surgery.    Post-Op Appts: 7/31/25, 9:20 AM, [place]     Imaging:  No      Discussed with patient pre-op RN will call 2-3 days prior to surgery with arrival time and instructions:  Yes     Packet sent out: Yes  6/24/25 [place]    Informed patient that they will need an adult  to bring patient home from surgery: Yes  : patient confirmed understanding.     Additional Comments:  NA      All patients questions were answered and patient was instructed to review surgical packet and call back with any questions or concerns.       Demetrio Luna on 6/24/2025 at 3:09 PM

## 2025-06-24 NOTE — TELEPHONE ENCOUNTER
Called and spoke with patient regarding scheduling surgery with Dr. Aranda.    Writer informed patient on the day of surgery, they will be having a Nuclear Medicine injection and scan (2-hours) prior to surgery start; surgery date will depend on Nuc Med appt availability.     Writer offered tentative dates of 7/16 EB, 7/23 EB, 8/4 CSC. Patient confirmed availability for all dates with a preference for the soonest available. Writer to call back once surgery date is confirmed.    Patient confirmed understanding.    Demetrio Luna on 6/24/2025 at 9:07 AM

## 2025-06-24 NOTE — TELEPHONE ENCOUNTER
Left voicemail for patient stating that surgery is confirmed for 7/16/25. Writer requested call back to finalize the scheduling process.    Demetrio Luna on 6/24/2025 at 1:27 PM

## 2025-07-05 DIAGNOSIS — E66.01 MORBID OBESITY (H): ICD-10-CM

## 2025-07-05 DIAGNOSIS — E11.9 TYPE 2 DIABETES MELLITUS WITHOUT COMPLICATION, WITHOUT LONG-TERM CURRENT USE OF INSULIN (H): ICD-10-CM

## 2025-07-05 RX ORDER — TIRZEPATIDE 10 MG/.5ML
INJECTION, SOLUTION SUBCUTANEOUS
Qty: 2 ML | Refills: 5 | Status: SHIPPED | OUTPATIENT
Start: 2025-07-05

## 2025-07-08 NOTE — PATIENT INSTRUCTIONS
How to Take Your Medication Before Surgery  Preoperative Medication Instructions   Antiplatelet or Anticoagulation Medication Instructions   - aspirin: Discontinue aspirin 7 days prior to procedure to reduce bleeding risk. It should be resumed postoperatively.     Additional Medication Instructions   - ACE/ARB/ARNI (lisinopril, enalapril, losartan, valsartan, olmesartan, sacubritril/valsartan) : Continue without modification (e.g., MAC anesthesia, neurosurgery, spine surgery, heart failure, or labile hypertension with risk of hypertension).   - Diuretics (furosemide, hydrochlorothiazide, chlorothalidone): DO NOT TAKE on the day of surgery.   - Statins (atorvastatin, simvastatin, pravastatin) : Continue taking on the day of surgery.    - GLP-1 Injectable (exenitide, liraglutide, semaglutide, dulaglutide, etc.): DO NOT TAKE 7 days before surgery    - fiber, laxatives: DO NOT TAKE day of surgery   - GLP-1 Injectable (exenitide, liraglutide, semaglutide, dulaglutide, etc.): DO NOT TAKE 7 days before surgery

## 2025-07-14 ASSESSMENT — ASTHMA QUESTIONNAIRES
ACT_TOTALSCORE: 25
QUESTION_3 LAST FOUR WEEKS HOW OFTEN DID YOUR ASTHMA SYMPTOMS (WHEEZING, COUGHING, SHORTNESS OF BREATH, CHEST TIGHTNESS OR PAIN) WAKE YOU UP AT NIGHT OR EARLIER THAN USUAL IN THE MORNING: NOT AT ALL
QUESTION_1 LAST FOUR WEEKS HOW MUCH OF THE TIME DID YOUR ASTHMA KEEP YOU FROM GETTING AS MUCH DONE AT WORK, SCHOOL OR AT HOME: NONE OF THE TIME
QUESTION_2 LAST FOUR WEEKS HOW OFTEN HAVE YOU HAD SHORTNESS OF BREATH: NOT AT ALL
QUESTION_5 LAST FOUR WEEKS HOW WOULD YOU RATE YOUR ASTHMA CONTROL: COMPLETELY CONTROLLED
QUESTION_4 LAST FOUR WEEKS HOW OFTEN HAVE YOU USED YOUR RESCUE INHALER OR NEBULIZER MEDICATION (SUCH AS ALBUTEROL): NOT AT ALL

## 2025-07-15 ENCOUNTER — ANESTHESIA EVENT (OUTPATIENT)
Dept: SURGERY | Facility: CLINIC | Age: 70
End: 2025-07-15
Payer: COMMERCIAL

## 2025-07-15 ENCOUNTER — OFFICE VISIT (OUTPATIENT)
Dept: FAMILY MEDICINE | Facility: OTHER | Age: 70
End: 2025-07-15
Attending: FAMILY MEDICINE
Payer: COMMERCIAL

## 2025-07-15 VITALS
OXYGEN SATURATION: 97 % | DIASTOLIC BLOOD PRESSURE: 84 MMHG | TEMPERATURE: 97.6 F | WEIGHT: 215.6 LBS | HEART RATE: 65 BPM | SYSTOLIC BLOOD PRESSURE: 134 MMHG | BODY MASS INDEX: 32.67 KG/M2 | HEIGHT: 68 IN | RESPIRATION RATE: 18 BRPM

## 2025-07-15 DIAGNOSIS — E03.9 ACQUIRED HYPOTHYROIDISM: ICD-10-CM

## 2025-07-15 DIAGNOSIS — Z01.818 PREOP GENERAL PHYSICAL EXAM: Primary | ICD-10-CM

## 2025-07-15 DIAGNOSIS — E66.01 MORBID OBESITY (H): ICD-10-CM

## 2025-07-15 DIAGNOSIS — E11.9 TYPE 2 DIABETES MELLITUS WITHOUT COMPLICATION, WITHOUT LONG-TERM CURRENT USE OF INSULIN (H): ICD-10-CM

## 2025-07-15 DIAGNOSIS — C43.9 MELANOMA OF SKIN (H): ICD-10-CM

## 2025-07-15 DIAGNOSIS — I10 ESSENTIAL HYPERTENSION: ICD-10-CM

## 2025-07-15 DIAGNOSIS — E78.5 HYPERLIPIDEMIA, UNSPECIFIED HYPERLIPIDEMIA TYPE: ICD-10-CM

## 2025-07-15 LAB
ANION GAP SERPL CALCULATED.3IONS-SCNC: 10 MMOL/L (ref 7–15)
ATRIAL RATE - MUSE: 59 BPM
BUN SERPL-MCNC: 13.2 MG/DL (ref 8–23)
CALCIUM SERPL-MCNC: 9.5 MG/DL (ref 8.8–10.4)
CHLORIDE SERPL-SCNC: 105 MMOL/L (ref 98–107)
CREAT SERPL-MCNC: 0.68 MG/DL (ref 0.51–0.95)
DIASTOLIC BLOOD PRESSURE - MUSE: NORMAL MMHG
EGFRCR SERPLBLD CKD-EPI 2021: >90 ML/MIN/1.73M2
EST. AVERAGE GLUCOSE BLD GHB EST-MCNC: 123 MG/DL
GLUCOSE SERPL-MCNC: 102 MG/DL (ref 70–99)
HBA1C MFR BLD: 5.9 %
HCO3 SERPL-SCNC: 28 MMOL/L (ref 22–29)
HGB BLD-MCNC: 13.6 G/DL (ref 11.7–15.7)
INTERPRETATION ECG - MUSE: NORMAL
MCV RBC AUTO: 90 FL (ref 78–100)
P AXIS - MUSE: 39 DEGREES
POTASSIUM SERPL-SCNC: 3.9 MMOL/L (ref 3.4–5.3)
PR INTERVAL - MUSE: 178 MS
QRS DURATION - MUSE: 90 MS
QT - MUSE: 444 MS
QTC - MUSE: 439 MS
R AXIS - MUSE: 44 DEGREES
SODIUM SERPL-SCNC: 143 MMOL/L (ref 135–145)
SYSTOLIC BLOOD PRESSURE - MUSE: NORMAL MMHG
T AXIS - MUSE: 43 DEGREES
T4 FREE SERPL-MCNC: 1.37 NG/DL (ref 0.9–1.7)
TSH SERPL DL<=0.005 MIU/L-ACNC: 0.01 UIU/ML (ref 0.3–4.2)
VENTRICULAR RATE- MUSE: 59 BPM

## 2025-07-15 PROCEDURE — G0463 HOSPITAL OUTPT CLINIC VISIT: HCPCS | Performed by: FAMILY MEDICINE

## 2025-07-15 PROCEDURE — 84439 ASSAY OF FREE THYROXINE: CPT | Mod: ZL | Performed by: FAMILY MEDICINE

## 2025-07-15 PROCEDURE — 93005 ELECTROCARDIOGRAM TRACING: CPT | Performed by: FAMILY MEDICINE

## 2025-07-15 PROCEDURE — 80048 BASIC METABOLIC PNL TOTAL CA: CPT | Mod: ZL | Performed by: FAMILY MEDICINE

## 2025-07-15 PROCEDURE — 84443 ASSAY THYROID STIM HORMONE: CPT | Mod: ZL | Performed by: FAMILY MEDICINE

## 2025-07-15 PROCEDURE — 36415 COLL VENOUS BLD VENIPUNCTURE: CPT | Mod: ZL | Performed by: FAMILY MEDICINE

## 2025-07-15 PROCEDURE — 83036 HEMOGLOBIN GLYCOSYLATED A1C: CPT | Mod: ZL | Performed by: FAMILY MEDICINE

## 2025-07-15 PROCEDURE — 85018 HEMOGLOBIN: CPT | Mod: ZL | Performed by: FAMILY MEDICINE

## 2025-07-15 RX ORDER — THYROID 90 MG/1
1 TABLET ORAL 2 TIMES DAILY
Status: ON HOLD | COMMUNITY
Start: 2025-07-15

## 2025-07-15 ASSESSMENT — PAIN SCALES - GENERAL: PAINLEVEL_OUTOF10: NO PAIN (0)

## 2025-07-15 NOTE — ANESTHESIA PREPROCEDURE EVALUATION
Anesthesia Pre-Procedure Evaluation    Patient: Neelam Miller   MRN: [id number] : 1955          Procedure : Procedure(s):  WIDE LOCAL EXCISION OF LEFT FOREARM MELANOMA  SENTINEL LYMPH NODE BIOPSY         Past Medical History:   Diagnosis Date    Allergic rhinitis     No Comments Provided    Essential (primary) hypertension     No Comments Provided    Hormone replacement therapy (postmenopausal)     , discontinued    Hyperlipidemia     No Comments Provided    Hypothyroidism     No Comments Provided    Major depressive disorder, single episode     2012    Melanoma of skin (H)     Lt arm    Morbid obesity (H) 2021    Obesity     No Comments Provided    Sleep apnea     wears cpap prn    Type 2 diabetes mellitus without complication, without long-term current use of insulin (H) 2018      Past Surgical History:   Procedure Laterality Date    CHOLECYSTECTOMY      1979    COLONOSCOPY      10/5/04    COLONOSCOPY  2015    ENDOSCOPY  2019    ENDOSCOPY  2022    gastric polyps    HYSTERECTOMY TOTAL ABDOMINAL, BILATERAL SALPINGO-OOPHORECTOMY, COMBINED  2002    uterine fibroids    LAPAROSCOPIC BYPASS GASTRIC  2007    lap-band    MAMMOPLASTY REDUCTION Bilateral     2/11/10    OTHER SURGICAL HISTORY      CHOLANGIOGRAM TRANSHEPATIC      Allergies   Allergen Reactions    Antihistamines, Chlorpheniramine-Type Other (See Comments)     Other reaction(s): Insomnia  Antihistamines  insomnia    Corn Dextrin      corn    Corn Oil Other (See Comments)     Allergy testing    Gluten     Gluten Meal Other (See Comments)     Allergy testing    Milk (Cow) Nausea and Vomiting    Milk Protein      Milk      Mold Other (See Comments)     Allergy testing    Molds & Smuts Other (See Comments)     Allergy testing    Wheat Other (See Comments)     Allergy testing      Social History     Tobacco Use    Smoking status: Never     Passive exposure: Never    Smokeless tobacco: Never   Substance Use  Topics    Alcohol use: Yes     Comment: rare      Wt Readings from Last 1 Encounters:   07/15/25 97.8 kg (215 lb 9.6 oz)        Anesthesia Evaluation   Pt has had prior anesthetic. Type: General.    History of anesthetic complications       ROS/MED HX  ENT/Pulmonary:     (+) sleep apnea,    RICHAR risk factors,  hypertension, obese,   allergic rhinitis,                             Neurologic:    (-) no CVA and no TIA   Cardiovascular:     (+)  hypertension- -   -  - -                                   (-) [facility]   METS/Exercise Tolerance:     Hematologic:    (-) anemia   Musculoskeletal:       GI/Hepatic: Comment: On injectable GLP-1    (+) GERD,                   Renal/Genitourinary:    (-) renal disease   Endo: Comment: On Tirzepitide    (+)  type II DM, Last HgA1c: 5.9,  Not using insulin,     thyroid problem, hypothyroidism,    Obesity,       Psychiatric/Substance Use:       Infectious Disease:    (-) Recent Fever   Malignancy:   (+) Malignancy, History of Skin.    Other:              Physical Exam  Airway  Mallampati: II  TM distance: >3 FB  Neck ROM: full  Mouth opening: >= 4 cm    Cardiovascular   Rhythm: regular  Rate: normal rate     Dental   (+) Modest Abnormalities - crowns, retainers, 1 or 2 missing teeth      Pulmonary Breath sounds clear to auscultation        Neurological   She appears awake, alert and oriented x3.    Other Findings       OUTSIDE LABS:  CBC:   Lab Results   Component Value Date    WBC 6.4 12/17/2019    HGB 13.9 12/17/2019    HGB 13.9 02/09/2015    HCT 41.8 12/17/2019    HCT 40.5 02/09/2015     12/17/2019     02/09/2015     BMP:   Lab Results   Component Value Date     01/15/2025     06/10/2024    POTASSIUM 3.7 01/15/2025    POTASSIUM 4.1 06/10/2024    CHLORIDE 103 01/15/2025    CHLORIDE 102 06/10/2024    CO2 29 01/15/2025    CO2 28 06/10/2024    BUN 16.1 01/15/2025    BUN 11.8 06/10/2024    CR 0.71 01/15/2025    CR 0.75 06/10/2024    GLC 97 01/15/2025      "(H) 06/10/2024     COAGS: No results found for: \"PTT\", \"INR\", \"FIBR\"  POC: No results found for: \"BGM\", \"HCG\", \"HCGS\"  HEPATIC:   Lab Results   Component Value Date    ALBUMIN 4.7 01/15/2025    PROTTOTAL 8.1 01/15/2025    ALT 42 01/15/2025    AST 35 01/15/2025    ALKPHOS 75 01/15/2025    BILITOTAL 0.4 01/15/2025     OTHER:   Lab Results   Component Value Date    A1C 5.9 (H) 01/15/2025    DINORA 9.9 01/15/2025    TSH 0.03 (L) 01/15/2025    T4 1.24 01/15/2025       Anesthesia Plan    ASA Status:  3      NPO Status: ELEVATED Aspiration Risk/Unknown   Anesthesia Type: General.  Airway: oral.  Induction: intravenous.  Maintenance: Balanced.   Techniques and Equipment:     - Airway:  Planned airway equipment includes video laryngoscope.     - Monitoring Plan: standard ASA monitoring, train of four monitoring     Consents    Anesthesia Plan(s) and associated risks, benefits, and realistic alternatives discussed. Questions answered and patient/representative(s) expressed understanding.     - Discussed: anesthesiologist, CRNA     - Discussed with:  Patient        - Pt is DNR/DNI Status: no DNR     Blood Consent:      - Discussed with: not discussed.     Postoperative Care    Pain management: non-narcotic analgesics, plan for postoperative opioid use.     Comments:                   Nichelle Pardo MD    I have reviewed the pertinent notes and labs in the chart from the past 30 days and (re)examined the patient.  Any updates or changes from those notes are reflected in this note.    Clinically Significant Risk Factors Present on Admission                   # Hypertension: Noted on problem list           # Obesity: Estimated body mass index is 32.88 kg/m  as calculated from the following:    Height as of 7/15/25: 1.725 m (5' 7.9\").    Weight as of 7/15/25: 97.8 kg (215 lb 9.6 oz).                    "

## 2025-07-15 NOTE — PROGRESS NOTES
Preoperative Evaluation  [facility]  [address]  [place] [address]  Phone: [phone]  Fax: [phone]  Primary Provider: KANDI MASTERS MD  Pre-op Performing Provider: KANDI MASTERS MD  Jul 15, 2025             7/14/2025   Surgical Information   What procedure is being done? pre op   Facility or Hospital where procedure/surgery will be performed: [facility]   Who is doing the procedure / surgery? Dr. Aranda   Date of surgery / procedure: 7/16/2025   Time of surgery / procedure: 8:45 AM 7/16/2025   Where do you plan to recover after surgery? at home with family     Fax number for surgical facility: [facility]    Assessment & Plan     The proposed surgical procedure is considered LOW risk.      ICD-10-CM    1. Preop general physical exam  Z01.818       2. Melanoma of skin (H)  C43.9       3. Type 2 diabetes mellitus without complication, without long-term current use of insulin (H)  E11.9 Basic Metabolic Panel     Hemoglobin     Hemoglobin A1c     EKG 12-lead complete w/read - Clinics     Basic Metabolic Panel     Hemoglobin     Hemoglobin A1c      4. Essential hypertension  I10 Basic Metabolic Panel     Hemoglobin     Hemoglobin A1c     EKG 12-lead complete w/read - Clinics     thyroid (ARMOUR) 90 MG tablet     Basic Metabolic Panel     Hemoglobin     Hemoglobin A1c      5. Hyperlipidemia, unspecified hyperlipidemia type  E78.5       6. Acquired hypothyroidism  E03.9 TSH Reflex GH     TSH Reflex GH     T4 free      7. Morbid obesity (H)  E66.01           Patient with good understanding of upcoming procedure.            - No identified additional risk factors other than previously addressed    Preoperative Medication Instructions  Antiplatelet or Anticoagulation Medication Instructions   - aspirin: Discontinue aspirin 7 days prior to procedure to reduce bleeding risk. It should be resumed postoperatively.     Additional Medication  Instructions   - ACE/ARB/ARNI (lisinopril, enalapril, losartan, valsartan, olmesartan, sacubritril/valsartan) : Continue without modification (e.g., MAC anesthesia, neurosurgery, spine surgery, heart failure, or labile hypertension with risk of hypertension).   - Diuretics (furosemide, hydrochlorothiazide, chlorothalidone): DO NOT TAKE on the day of surgery.   - Statins (atorvastatin, simvastatin, pravastatin) : Continue taking on the day of surgery.    - GLP-1 Injectable (exenitide, liraglutide, semaglutide, dulaglutide, etc.): DO NOT TAKE 7 days before surgery    - fiber, laxatives: DO NOT TAKE day of surgery   - GLP-1 Injectable (exenitide, liraglutide, semaglutide, dulaglutide, etc.): DO NOT TAKE 7 days before surgery     Recommendation  Approval given to proceed with proposed procedure, without further diagnostic evaluation.    Flori Perry MD     River Richter is a 70 year old, presenting for the following:  Pre-Op Exam (Left arm melanoma excision, 7/16/25, Dr. Aranda, [facility])          7/15/2025    11:09 AM   Additional Questions   Roomed by Patricia AVILA LPN     HPI: Neelam Miller is a 70 year old female in for preoperative evaluation prior to left arm melanoma resection.  This will include sentinel lymph node biopsy.  The mole came up quickly this spring.  Excision was done, pathology was positive and she had follow up with dermatologic surgery.    She is left handed.            7/14/2025   Pre-Op Questionnaire   Have you ever had a heart attack or stroke? No   Have you ever had surgery on your heart or blood vessels, such as a stent placement, a coronary artery bypass, or surgery on an artery in your head, neck, heart, or legs? No   Do you have chest pain with activity? No   Do you have a history of heart failure? No   Do you currently have a cold, bronchitis or symptoms of other infection? No   Do you have a cough, shortness of breath, or wheezing? No   Do you or anyone in your family  have previous history of blood clots? No   Do you or does anyone in your family have a serious bleeding problem such as prolonged bleeding following surgeries or cuts? No   Have you ever had problems with anemia or been told to take iron pills? No   Have you had any abnormal blood loss such as black, tarry or bloody stools, or abnormal vaginal bleeding? No   Have you ever had a blood transfusion? No   Are you willing to have a blood transfusion if it is medically needed before, during, or after your surgery? Yes   Have you or any of your relatives ever had problems with anesthesia? No   Do you have sleep apnea, excessive snoring or daytime drowsiness? (!) YES   Do you have a CPAP machine? Yes   Do you have any artifical heart valves or other implanted medical devices like a pacemaker, defibrillator, or continuous glucose monitor? No   Do you have artificial joints? No   Are you allergic to latex? No     Advance Care Planning    Document on file is a Health Care Directive or POLST.    Preoperative Review of    reviewed - no record of controlled substances prescribed.  PDMP Review         Value Time User    State PDMP site checked  Yes 7/15/2025 11:23 AM Flori Carney MD             Status of Chronic Conditions:  DIABETES - Patient has a longstanding history of DiabetesType Type II . Patient is being treated with mounjaro and denies significant side effects. Control has been good. Complicating factors include but are not limited to: hypertension and hyperlipidemia.     HYPERTENSION - Patient has longstanding history of HTN , currently denies any symptoms referable to elevated blood pressure. Specifically denies chest pain, palpitations, dyspnea, orthopnea, PND or peripheral edema. Blood pressure readings have been in normal range. Current medication regimen is as listed below. Patient denies any side effects of medication.     HYPOTHYROIDISM - Patient has a longstanding history of chronic  Hypothyroidism. Patient has been doing well, noting no tremor, insomnia, hair loss or changes in skin texture. Continues to take medications as directed, without adverse reactions or side effects. Last TSH   Lab Results   Component Value Date    TSH 0.01 (L) 07/15/2025   .      Patient Active Problem List    Diagnosis Date Noted    Malignant melanoma of left forearm (H) 05/30/2025     Priority: Medium    Obesity (BMI 30.0-34.9) 05/22/2025     Priority: Medium    Essential hypertension 09/11/2019     Priority: Medium    RICHAR (obstructive sleep apnea) 09/11/2019     Priority: Medium    Type 2 diabetes mellitus without complication, without long-term current use of insulin (H) 03/05/2018     Priority: Medium    Hyperlipidemia 01/19/2018     Priority: Medium    Hypothyroidism 01/19/2018     Priority: Medium    Microalbuminuria 01/19/2018     Priority: Medium     Overview:   elevated urine      Tear of right glenoid labrum 07/17/2017     Priority: Medium    Bunion, left 02/09/2015     Priority: Medium    Perennial allergic rhinitis 09/05/2013     Priority: Medium    Fungal dermatitis 03/20/2012     Priority: Medium    Ganglion cyst 06/06/2011     Priority: Medium    Knee pain 10/12/2010     Priority: Medium    Vitamin D deficiency 06/16/2010     Priority: Medium    Status post bariatric surgery 01/23/2008     Priority: Medium     IMO Update 10/11        Past Medical History:   Diagnosis Date    Allergic rhinitis     No Comments Provided    Essential (primary) hypertension     No Comments Provided    Hormone replacement therapy (postmenopausal)     5/04, discontinued    Hyperlipidemia     No Comments Provided    Hypothyroidism     No Comments Provided    Major depressive disorder, single episode     03/2012    Melanoma of skin (H) 2025    Lt arm    Morbid obesity (H) 04/12/2021    Obesity     No Comments Provided    Sleep apnea     wears cpap prn    Type 2 diabetes mellitus without complication, without long-term current  use of insulin (H) 03/05/2018     Past Surgical History:   Procedure Laterality Date    CHOLECYSTECTOMY      1979    COLONOSCOPY      10/5/04    COLONOSCOPY  03/18/2015    ENDOSCOPY  09/2019    ENDOSCOPY  07/07/2022    gastric polyps    HYSTERECTOMY TOTAL ABDOMINAL, BILATERAL SALPINGO-OOPHORECTOMY, COMBINED  06/18/2002    uterine fibroids    LAPAROSCOPIC BYPASS GASTRIC  12/2007    lap-band    MAMMOPLASTY REDUCTION Bilateral     2/11/10    OTHER SURGICAL HISTORY  1979    CHOLANGIOGRAM TRANSHEPATIC     Current Outpatient Medications   Medication Sig Dispense Refill    albuterol (PROAIR HFA/PROVENTIL HFA/VENTOLIN HFA) 108 (90 Base) MCG/ACT inhaler Inhale 2 puffs into the lungs every 6 hours 8.5 g 3    Ascorbic Acid 1000 MG TABS 6858-3528 mg daily      aspirin 81 MG tablet Take 81 mg by mouth daily with food      blood glucose (NO BRAND SPECIFIED) lancets standard Use to test blood sugar 1 times daily or as directed Dx Code E11.9 100 each 1    blood glucose monitoring (ONE TOUCH ULTRA 2) meter device kit       Calcium-Vitamin D-Vitamin K 500-100-40 MG-UNT-MCG CHEW Take 2 tablets by mouth daily.      DHEA 10 MG TABS       ERGOCALCIFEROL 1,000 Units daily 5000 spring and summer, 15,000 fall and winter      fish oil-omega-3 fatty acids 1000 MG capsule Take 3,000 mg by mouth.      fluconazole (DIFLUCAN) 100 MG tablet Take 1 tablet (100 mg) by mouth as needed. 90 tablet 4    fluocinolone (SYNALAR) 0.01 % external cream Apply topically 2 times daily 60 g 3    hydrochlorothiazide (HYDRODIURIL) 25 MG tablet Take 1 tablet (25 mg) by mouth daily. 90 tablet 4    Lancets (ONETOUCH DELICA PLUS LCOQUP12P) MISC       lisinopril (ZESTRIL) 20 MG tablet Take 1 tablet (20 mg) by mouth daily. 90 tablet 3    Magnesium 400 MG CAPS Take 400 mg by mouth daily.      Milk Thistle 175 MG CAPS       Omega-3 Fatty Acids (OMEGA-3 EPA FISH OIL PO) Take 3,000 mg by mouth daily 350-400 mg cap  1 grm daily      omeprazole (PRILOSEC) 20 MG DR capsule One  capsule on  and Thursday 24 capsule 11    ONETOUCH ULTRA test strip USE TO TEST BLOOD SUGAR 1 TIME DAILY. DX CODE E11.9 100 strip 1    simvastatin (ZOCOR) 20 MG tablet Take 1 tablet (20 mg) by mouth at bedtime. 90 tablet 4    Theanine 200 MG CAPS Take 400 mg by mouth.      thyroid (ARMOUR) 90 MG tablet Take 1 tablet (90 mg) by mouth 2 times daily.      tirzepatide (MOUNJARO) 10 MG/0.5ML SOAJ auto-injector pen INJECT 0.5 MLS (10 MG) SUBCUTANEOUSLY EVERY 7 DAYS. 2 mL 5    tirzepatide (MOUNJARO) 2.5 MG/0.5ML pen Inject 2.5 mg subcutaneously every 7 days.         Allergies   Allergen Reactions    Antihistamines, Chlorpheniramine-Type Other (See Comments)     Other reaction(s): Insomnia  Antihistamines  insomnia    Corn Dextrin      corn    Corn Oil Other (See Comments)     Allergy testing    Gluten     Gluten Meal Other (See Comments)     Allergy testing    Milk (Cow) Nausea and Vomiting    Milk Protein      Milk      Mold Other (See Comments)     Allergy testing    Molds & Smuts Other (See Comments)     Allergy testing    Wheat Other (See Comments)     Allergy testing        Social History     Tobacco Use    Smoking status: Never     Passive exposure: Never    Smokeless tobacco: Never   Substance Use Topics    Alcohol use: Yes     Comment: rare     Family History   Problem Relation Age of Onset    Diabetes Mother 40        Diabetes    Heart Disease Mother 61        Heart Disease, age 61 of CHF    Heart Disease Father 80        Heart Disease, at 80, had pacemaker, history of MI    Diabetes Father         Diabetes,Type 2    Thyroid Disease Sister     Hypothyroidism Sister     Family History Negative Sister         Good Health, younger sister    Family History Negative Sister         Good Health, younger sister    Hypertension Brother         Hypertension, younger brother    Heart Disease Brother 60        pericarditis    Myocardial Infarction Brother     Diabetes Maternal Grandmother      History   Drug Use  "Unknown             Review of Systems  Glasses worn     Objective    /84 (BP Location: Right arm, Patient Position: Sitting, Cuff Size: Adult Regular)   Pulse 65   Temp 97.6  F (36.4  C) (Temporal)   Resp 18   Ht 1.725 m (5' 7.9\")   Wt 97.8 kg (215 lb 9.6 oz)   LMP  (LMP Unknown)   SpO2 97%   Breastfeeding No   BMI 32.88 kg/m     Estimated body mass index is 32.88 kg/m  as calculated from the following:    Height as of this encounter: 1.725 m (5' 7.9\").    Weight as of this encounter: 97.8 kg (215 lb 9.6 oz).  Physical Exam  GENERAL: alert and no distress  EYES: Eyes grossly normal to inspection, PERRL and conjunctivae and sclerae normal  RESP: lungs clear to auscultation - no rales, rhonchi or wheezes  CV: RRR  SKIN: healed scar/excision left forearm          Diagnostics  Recent Results (from the past 24 hours)   EKG 12-lead complete w/read - Clinics    Collection Time: 07/15/25 12:00 PM   Result Value Ref Range    Systolic Blood Pressure  mmHg    Diastolic Blood Pressure  mmHg    Ventricular Rate 59 BPM    Atrial Rate 59 BPM    AR Interval 178 ms    QRS Duration 90 ms     ms    QTc 439 ms    P Axis 39 degrees    R AXIS 44 degrees    T Axis 43 degrees    Interpretation ECG       Sinus bradycardia with sinus arrhythmia  Otherwise normal ECG  When compared with ECG of 14-May-2024 12:29,  No significant change was found  Confirmed by MD POORNIMA, MILLER ([id number]) on 7/15/2025 4:41:49 PM     Basic Metabolic Panel    Collection Time: 07/15/25 12:06 PM   Result Value Ref Range    Sodium 143 135 - 145 mmol/L    Potassium 3.9 3.4 - 5.3 mmol/L    Chloride 105 98 - 107 mmol/L    Carbon Dioxide (CO2) 28 22 - 29 mmol/L    Anion Gap 10 7 - 15 mmol/L    Urea Nitrogen 13.2 8.0 - 23.0 mg/dL    Creatinine 0.68 0.51 - 0.95 mg/dL    GFR Estimate >90 >60 mL/min/1.73m2    Calcium 9.5 8.8 - 10.4 mg/dL    Glucose 102 (H) 70 - 99 mg/dL   Hemoglobin    Collection Time: 07/15/25 12:06 PM   Result Value Ref Range    " Hemoglobin 13.6 11.7 - 15.7 g/dL    MCV 90 78 - 100 fL   Hemoglobin A1c    Collection Time: 07/15/25 12:06 PM   Result Value Ref Range    Estimated Average Glucose 123 (H) <117 mg/dL    Hemoglobin A1C 5.9 (H) <5.7 %       Revised Cardiac Risk Index (RCRI)  The patient has the following serious cardiovascular risks for perioperative complications:   - No serious cardiac risks = 0 points     RCRI Interpretation: 0 points: Class I (very low risk - 0.4% complication rate)         Signed Electronically by: KANDI MASTERS MD  A copy of this evaluation report is provided to the requesting physician.         Answers submitted by the patient for this visit:  General Questionnaire (Submitted on 7/14/2025)  Chief Complaint: Chronic problems general questions HPI Form  What is the reason for your visit today? : pre op for 7/16/2025  How many days per week do you miss taking your medication?: 0  Questionnaire about: Chronic problems general questions HPI Form (Submitted on 7/14/2025)  Chief Complaint: Chronic problems general questions HPI Form

## 2025-07-15 NOTE — NURSING NOTE
"Chief Complaint   Patient presents with    Pre-Op Exam     Left arm melanoma excision, 7/16/25, Dr. Aranda, [facility] [place]       Initial /84 (BP Location: Right arm, Patient Position: Sitting, Cuff Size: Adult Regular)   Pulse 65   Temp 97.6  F (36.4  C) (Temporal)   Resp 18   Ht 1.725 m (5' 7.9\")   Wt 97.8 kg (215 lb 9.6 oz)   LMP  (LMP Unknown)   SpO2 97%   Breastfeeding No   BMI 32.88 kg/m   Estimated body mass index is 32.88 kg/m  as calculated from the following:    Height as of this encounter: 1.725 m (5' 7.9\").    Weight as of this encounter: 97.8 kg (215 lb 9.6 oz).    Medication Review: complete    The next two questions are to help us understand your food security.  If you are feeling you need any assistance in this area, we have resources available to support you today.          1/10/2025   SDOH- Food Insecurity   Within the past 12 months, did you worry that your food would run out before you got money to buy more? N   Within the past 12 months, did the food you bought just not last and you didn t have money to get more? N        Data saved with a previous flowsheet row definition       Health Care Directive:  Patient has a Health Care Directive on file      Patricia Mejia LPN      "

## 2025-07-16 ENCOUNTER — ANESTHESIA (OUTPATIENT)
Dept: SURGERY | Facility: CLINIC | Age: 70
End: 2025-07-16
Payer: COMMERCIAL

## 2025-07-16 ENCOUNTER — HOSPITAL ENCOUNTER (OUTPATIENT)
Facility: CLINIC | Age: 70
Discharge: HOME | End: 2025-07-16
Attending: SURGERY | Admitting: SURGERY
Payer: COMMERCIAL

## 2025-07-16 ENCOUNTER — HOSPITAL ENCOUNTER (OUTPATIENT)
Dept: NUCLEAR MEDICINE | Facility: CLINIC | Age: 70
Setting detail: NUCLEAR MEDICINE
Discharge: HOME | End: 2025-07-16
Attending: SURGERY
Payer: COMMERCIAL

## 2025-07-16 VITALS
RESPIRATION RATE: 18 BRPM | BODY MASS INDEX: 32.81 KG/M2 | TEMPERATURE: 97.8 F | WEIGHT: 216.49 LBS | HEART RATE: 63 BPM | SYSTOLIC BLOOD PRESSURE: 149 MMHG | HEIGHT: 68 IN | DIASTOLIC BLOOD PRESSURE: 84 MMHG | OXYGEN SATURATION: 99 %

## 2025-07-16 DIAGNOSIS — C43.62 MALIGNANT MELANOMA OF LEFT UPPER EXTREMITY INCLUDING SHOULDER (H): Primary | ICD-10-CM

## 2025-07-16 DIAGNOSIS — C43.62 MALIGNANT MELANOMA OF LEFT UPPER EXTREMITY INCLUDING SHOULDER (H): ICD-10-CM

## 2025-07-16 LAB
GLUCOSE BLDC GLUCOMTR-MCNC: 117 MG/DL (ref 70–99)
GLUCOSE BLDC GLUCOMTR-MCNC: 79 MG/DL (ref 70–99)

## 2025-07-16 PROCEDURE — A9520 TC99 TILMANOCEPT DIAG 0.5MCI: HCPCS | Performed by: SURGERY

## 2025-07-16 PROCEDURE — 88307 TISSUE EXAM BY PATHOLOGIST: CPT | Mod: 26 | Performed by: DERMATOLOGY

## 2025-07-16 PROCEDURE — 710N000010 HC RECOVERY PHASE 1, LEVEL 2, PER MIN: Performed by: SURGERY

## 2025-07-16 PROCEDURE — 250N000011 HC RX IP 250 OP 636: Performed by: REGISTERED NURSE

## 2025-07-16 PROCEDURE — 88305 TISSUE EXAM BY PATHOLOGIST: CPT | Mod: 26 | Performed by: DERMATOLOGY

## 2025-07-16 PROCEDURE — 258N000003 HC RX IP 258 OP 636: Performed by: REGISTERED NURSE

## 2025-07-16 PROCEDURE — 250N000009 HC RX 250: Performed by: REGISTERED NURSE

## 2025-07-16 PROCEDURE — 88307 TISSUE EXAM BY PATHOLOGIST: CPT | Mod: TC | Performed by: SURGERY

## 2025-07-16 PROCEDURE — 82962 GLUCOSE BLOOD TEST: CPT

## 2025-07-16 PROCEDURE — 710N000012 HC RECOVERY PHASE 2, PER MINUTE: Performed by: SURGERY

## 2025-07-16 PROCEDURE — 250N000011 HC RX IP 250 OP 636: Performed by: SURGERY

## 2025-07-16 PROCEDURE — 343N000001 HC RX 343 MED OP 636: Performed by: SURGERY

## 2025-07-16 PROCEDURE — 360N000076 HC SURGERY LEVEL 3, PER MIN: Performed by: SURGERY

## 2025-07-16 PROCEDURE — 250N000025 HC SEVOFLURANE, PER MIN: Performed by: SURGERY

## 2025-07-16 PROCEDURE — 38900 IO MAP OF SENT LYMPH NODE: CPT | Mod: LT | Performed by: SURGERY

## 2025-07-16 PROCEDURE — 999N000141 HC STATISTIC PRE-PROCEDURE NURSING ASSESSMENT: Performed by: SURGERY

## 2025-07-16 PROCEDURE — 370N000017 HC ANESTHESIA TECHNICAL FEE, PER MIN: Performed by: SURGERY

## 2025-07-16 PROCEDURE — 250N000011 HC RX IP 250 OP 636

## 2025-07-16 PROCEDURE — 78195 LYMPH SYSTEM IMAGING: CPT

## 2025-07-16 PROCEDURE — 250N000011 HC RX IP 250 OP 636: Performed by: STUDENT IN AN ORGANIZED HEALTH CARE EDUCATION/TRAINING PROGRAM

## 2025-07-16 PROCEDURE — 38525 BIOPSY/REMOVAL LYMPH NODES: CPT | Mod: LT | Performed by: SURGERY

## 2025-07-16 PROCEDURE — 11606 EXC TR-EXT MAL+MARG >4 CM: CPT | Mod: LT | Performed by: SURGERY

## 2025-07-16 PROCEDURE — 250N000013 HC RX MED GY IP 250 OP 250 PS 637

## 2025-07-16 PROCEDURE — 12034 INTMD RPR S/TR/EXT 7.6-12.5: CPT | Mod: 59 | Performed by: SURGERY

## 2025-07-16 PROCEDURE — 78195 LYMPH SYSTEM IMAGING: CPT | Mod: 26 | Performed by: RADIOLOGY

## 2025-07-16 PROCEDURE — 272N000001 HC OR GENERAL SUPPLY STERILE: Performed by: SURGERY

## 2025-07-16 PROCEDURE — 88342 IMHCHEM/IMCYTCHM 1ST ANTB: CPT | Mod: 26 | Performed by: DERMATOLOGY

## 2025-07-16 PROCEDURE — 88341 IMHCHEM/IMCYTCHM EA ADD ANTB: CPT | Mod: 26 | Performed by: DERMATOLOGY

## 2025-07-16 PROCEDURE — 250N000009 HC RX 250: Performed by: SURGERY

## 2025-07-16 RX ORDER — NALOXONE HYDROCHLORIDE 0.4 MG/ML
0.1 INJECTION, SOLUTION INTRAMUSCULAR; INTRAVENOUS; SUBCUTANEOUS
Status: DISCONTINUED | OUTPATIENT
Start: 2025-07-16 | End: 2025-07-16 | Stop reason: HOSPADM

## 2025-07-16 RX ORDER — HYDROMORPHONE HCL IN WATER/PF 6 MG/30 ML
0.2 PATIENT CONTROLLED ANALGESIA SYRINGE INTRAVENOUS EVERY 5 MIN PRN
Status: DISCONTINUED | OUTPATIENT
Start: 2025-07-16 | End: 2025-07-16 | Stop reason: HOSPADM

## 2025-07-16 RX ORDER — SODIUM CHLORIDE, SODIUM LACTATE, POTASSIUM CHLORIDE, CALCIUM CHLORIDE 600; 310; 30; 20 MG/100ML; MG/100ML; MG/100ML; MG/100ML
INJECTION, SOLUTION INTRAVENOUS CONTINUOUS PRN
Status: DISCONTINUED | OUTPATIENT
Start: 2025-07-16 | End: 2025-07-16

## 2025-07-16 RX ORDER — ENOXAPARIN SODIUM 100 MG/ML
40 INJECTION SUBCUTANEOUS
Status: COMPLETED | OUTPATIENT
Start: 2025-07-16 | End: 2025-07-16

## 2025-07-16 RX ORDER — ONDANSETRON 4 MG/1
4 TABLET, ORALLY DISINTEGRATING ORAL EVERY 30 MIN PRN
Status: DISCONTINUED | OUTPATIENT
Start: 2025-07-16 | End: 2025-07-16 | Stop reason: HOSPADM

## 2025-07-16 RX ORDER — CEFAZOLIN SODIUM/WATER 2 G/20 ML
2 SYRINGE (ML) INTRAVENOUS SEE ADMIN INSTRUCTIONS
Status: DISCONTINUED | OUTPATIENT
Start: 2025-07-16 | End: 2025-07-16 | Stop reason: HOSPADM

## 2025-07-16 RX ORDER — SODIUM CHLORIDE, SODIUM LACTATE, POTASSIUM CHLORIDE, CALCIUM CHLORIDE 600; 310; 30; 20 MG/100ML; MG/100ML; MG/100ML; MG/100ML
INJECTION, SOLUTION INTRAVENOUS CONTINUOUS
Status: DISCONTINUED | OUTPATIENT
Start: 2025-07-16 | End: 2025-07-16 | Stop reason: HOSPADM

## 2025-07-16 RX ORDER — CEFAZOLIN SODIUM/WATER 2 G/20 ML
2 SYRINGE (ML) INTRAVENOUS
Status: COMPLETED | OUTPATIENT
Start: 2025-07-16 | End: 2025-07-16

## 2025-07-16 RX ORDER — ISOSULFAN BLUE 50 MG/5ML
INJECTION, SOLUTION SUBCUTANEOUS PRN
Status: DISCONTINUED | OUTPATIENT
Start: 2025-07-16 | End: 2025-07-16 | Stop reason: HOSPADM

## 2025-07-16 RX ORDER — LABETALOL HYDROCHLORIDE 5 MG/ML
10 INJECTION, SOLUTION INTRAVENOUS
Status: DISCONTINUED | OUTPATIENT
Start: 2025-07-16 | End: 2025-07-16 | Stop reason: HOSPADM

## 2025-07-16 RX ORDER — ONDANSETRON 2 MG/ML
4 INJECTION INTRAMUSCULAR; INTRAVENOUS EVERY 30 MIN PRN
Status: DISCONTINUED | OUTPATIENT
Start: 2025-07-16 | End: 2025-07-16 | Stop reason: HOSPADM

## 2025-07-16 RX ORDER — FENTANYL CITRATE 50 UG/ML
50 INJECTION, SOLUTION INTRAMUSCULAR; INTRAVENOUS EVERY 5 MIN PRN
Status: DISCONTINUED | OUTPATIENT
Start: 2025-07-16 | End: 2025-07-16 | Stop reason: HOSPADM

## 2025-07-16 RX ORDER — ACETAMINOPHEN 325 MG/1
975 TABLET ORAL ONCE
Status: COMPLETED | OUTPATIENT
Start: 2025-07-16 | End: 2025-07-16

## 2025-07-16 RX ORDER — ACETAMINOPHEN 325 MG/1
975 TABLET ORAL EVERY 8 HOURS
Status: SHIPPED
Start: 2025-07-16

## 2025-07-16 RX ORDER — DEXAMETHASONE SODIUM PHOSPHATE 4 MG/ML
4 INJECTION, SOLUTION INTRA-ARTICULAR; INTRALESIONAL; INTRAMUSCULAR; INTRAVENOUS; SOFT TISSUE
Status: DISCONTINUED | OUTPATIENT
Start: 2025-07-16 | End: 2025-07-16 | Stop reason: HOSPADM

## 2025-07-16 RX ORDER — FENTANYL CITRATE 50 UG/ML
INJECTION, SOLUTION INTRAMUSCULAR; INTRAVENOUS PRN
Status: DISCONTINUED | OUTPATIENT
Start: 2025-07-16 | End: 2025-07-16

## 2025-07-16 RX ORDER — HYDROMORPHONE HCL IN WATER/PF 6 MG/30 ML
0.4 PATIENT CONTROLLED ANALGESIA SYRINGE INTRAVENOUS EVERY 5 MIN PRN
Status: DISCONTINUED | OUTPATIENT
Start: 2025-07-16 | End: 2025-07-16 | Stop reason: HOSPADM

## 2025-07-16 RX ORDER — HYDRALAZINE HYDROCHLORIDE 20 MG/ML
2.5-5 INJECTION INTRAMUSCULAR; INTRAVENOUS EVERY 10 MIN PRN
Status: DISCONTINUED | OUTPATIENT
Start: 2025-07-16 | End: 2025-07-16 | Stop reason: HOSPADM

## 2025-07-16 RX ORDER — IBUPROFEN 600 MG/1
600 TABLET, FILM COATED ORAL EVERY 6 HOURS PRN
Status: CANCELLED
Start: 2025-07-16

## 2025-07-16 RX ORDER — PROPOFOL 10 MG/ML
INJECTION, EMULSION INTRAVENOUS PRN
Status: DISCONTINUED | OUTPATIENT
Start: 2025-07-16 | End: 2025-07-16

## 2025-07-16 RX ORDER — ONDANSETRON 2 MG/ML
INJECTION INTRAMUSCULAR; INTRAVENOUS PRN
Status: DISCONTINUED | OUTPATIENT
Start: 2025-07-16 | End: 2025-07-16

## 2025-07-16 RX ORDER — LIDOCAINE 40 MG/G
CREAM TOPICAL
Status: DISCONTINUED | OUTPATIENT
Start: 2025-07-16 | End: 2025-07-16 | Stop reason: HOSPADM

## 2025-07-16 RX ORDER — LIDOCAINE HYDROCHLORIDE 20 MG/ML
INJECTION, SOLUTION INFILTRATION; PERINEURAL PRN
Status: DISCONTINUED | OUTPATIENT
Start: 2025-07-16 | End: 2025-07-16

## 2025-07-16 RX ORDER — FENTANYL CITRATE 50 UG/ML
25 INJECTION, SOLUTION INTRAMUSCULAR; INTRAVENOUS EVERY 5 MIN PRN
Status: DISCONTINUED | OUTPATIENT
Start: 2025-07-16 | End: 2025-07-16 | Stop reason: HOSPADM

## 2025-07-16 RX ADMIN — ONDANSETRON 4 MG: 2 INJECTION INTRAMUSCULAR; INTRAVENOUS at 14:39

## 2025-07-16 RX ADMIN — Medication 2 G: at 13:09

## 2025-07-16 RX ADMIN — Medication 100 MG: at 14:21

## 2025-07-16 RX ADMIN — SODIUM CHLORIDE, SODIUM LACTATE, POTASSIUM CHLORIDE, AND CALCIUM CHLORIDE: .6; .31; .03; .02 INJECTION, SOLUTION INTRAVENOUS at 12:56

## 2025-07-16 RX ADMIN — PROPOFOL 30 MG: 10 INJECTION, EMULSION INTRAVENOUS at 14:19

## 2025-07-16 RX ADMIN — Medication 100 MG: at 14:24

## 2025-07-16 RX ADMIN — ACETAMINOPHEN 975 MG: 325 TABLET ORAL at 12:36

## 2025-07-16 RX ADMIN — ENOXAPARIN SODIUM 40 MG: 40 INJECTION SUBCUTANEOUS at 12:36

## 2025-07-16 RX ADMIN — TILMANOCEPT 0.5 MILLICURIE: KIT at 09:04

## 2025-07-16 RX ADMIN — FENTANYL CITRATE 100 MCG: 50 INJECTION INTRAMUSCULAR; INTRAVENOUS at 13:04

## 2025-07-16 RX ADMIN — LIDOCAINE HYDROCHLORIDE 100 MG: 20 INJECTION, SOLUTION INFILTRATION; PERINEURAL at 13:04

## 2025-07-16 RX ADMIN — ONDANSETRON 4 MG: 2 INJECTION INTRAMUSCULAR; INTRAVENOUS at 13:55

## 2025-07-16 RX ADMIN — Medication 90 MG: at 13:06

## 2025-07-16 RX ADMIN — PROPOFOL 150 MG: 10 INJECTION, EMULSION INTRAVENOUS at 13:05

## 2025-07-16 ASSESSMENT — ACTIVITIES OF DAILY LIVING (ADL)
ADLS_ACUITY_SCORE: 36
ADLS_ACUITY_SCORE: 35
ADLS_ACUITY_SCORE: 36
ADLS_ACUITY_SCORE: 36
ADLS_ACUITY_SCORE: 35
ADLS_ACUITY_SCORE: 36
ADLS_ACUITY_SCORE: 35
ADLS_ACUITY_SCORE: 35

## 2025-07-16 NOTE — BRIEF OP NOTE
[facility]    Brief Operative Note    Pre-operative diagnosis: Malignant melanoma of left upper extremity including shoulder (H) [C43.62]  Post-operative diagnosis Same as pre-operative diagnosis    Procedure: WIDE LOCAL EXCISION OF LEFT FOREARM MELANOMA, Left - Arm  SENTINEL LYMPH NODE BIOPSY, N/A - Axilla    Surgeon: Surgeons and Role:     * Jermaine Aranda MD - Primary  Anesthesia: General   Estimated Blood Loss: 3 mL from 7/16/2025  1:00 PM to 7/16/2025  2:30 PM      Drains: None  Specimens:   ID Type Source Tests Collected by Time Destination   1 : Wide exicision LEFT arm melanoma Tissue Forearm, Left SURGICAL PATHOLOGY EXAM Jermaine Aranda MD 7/16/2025  1:40 PM    2 : LEFT axillary sentinel lymph node biopsy #1 Tissue Lymph Node(s), [place] SURGICAL PATHOLOGY EXAM Jermaine Aranda MD 7/16/2025  2:03 PM      Findings:   None.  Complications: None.  Implants: * No implants in log *

## 2025-07-16 NOTE — ANESTHESIA PROCEDURE NOTES
Airway       Patient location during procedure: OR       Procedure Start/Stop Times: 7/16/2025 1:08 PM  Staff -        Other Anesthesia Staff: Pushpa Nicholas       Performed By: SRNA  Consent for Airway        Urgency: elective  Indications and Patient Condition       Indications for airway management: yao-procedural       Induction type:intravenous       Mask difficulty assessment: 1 - vent by mask    Final Airway Details       Final airway type: endotracheal airway       Successful airway: ETT - single  Endotracheal Airway Details        ETT size (mm): 7.0       Cuffed: yes       Successful intubation technique: video laryngoscopy       VL Blade Size: Glidescope 4       Grade View of Cords: 1       Adjucts: stylet       Position: Right       Measured from: lips       Secured at (cm): 22       Bite block used: None    Post intubation assessment        Placement verified by: capnometry, equal breath sounds and chest rise        Number of attempts at approach: 1       Number of other approaches attempted: 0       Secured with: tape       Ease of procedure: easy       Dentition: Intact and Unchanged    Medication(s) Administered   Medication Administration Time: 7/16/2025 1:08 PM

## 2025-07-16 NOTE — ANESTHESIA CARE TRANSFER NOTE
Patient: Neelam Miller    Procedure: Procedure(s):  WIDE LOCAL EXCISION OF LEFT FOREARM MELANOMA  SENTINEL LYMPH NODE BIOPSY       Diagnosis: Malignant melanoma of left upper extremity including shoulder (H) [C43.62]  Diagnosis Additional Information: No value filed.    Anesthesia Type:   General     Note:    Oropharynx: oropharynx clear of all foreign objects and spontaneously breathing  Level of Consciousness: awake and drowsy  Oxygen Supplementation: face mask  Level of Supplemental Oxygen (L/min / FiO2): 6  Independent Airway: airway patency satisfactory and stable  Dentition: dentition unchanged  Vital Signs Stable: post-procedure vital signs reviewed and stable  Report to RN Given: handoff report given  Patient transferred to: PACU    Handoff Report: Identifed the Patient, Identified the Reponsible Provider, Reviewed the pertinent medical history, Discussed the surgical course, Reviewed Intra-OP anesthesia mangement and issues during anesthesia, Set expectations for post-procedure period and Allowed opportunity for questions and acknowledgement of understanding      Vitals:  Vitals Value Taken Time   /79 07/16/25 14:33   Temp     Pulse 78 07/16/25 14:33   Resp 13 07/16/25 14:33   SpO2 100 % 07/16/25 14:33   Vitals shown include unfiled device data.    Electronically Signed By: BINH Vega CRNA  July 16, 2025  2:35 PM

## 2025-07-16 NOTE — ANESTHESIA POSTPROCEDURE EVALUATION
Patient: Neelam Miller    Procedure: Procedure(s):  WIDE LOCAL EXCISION OF LEFT FOREARM MELANOMA  SENTINEL LYMPH NODE BIOPSY       Anesthesia Type:  General    Note:  Disposition: Outpatient   Postop Pain Control: Uneventful            Sign Out: Well controlled pain   PONV: No   Neuro/Psych: Uneventful            Sign Out: Acceptable/Baseline neuro status   Airway/Respiratory: Uneventful            Sign Out: Acceptable/Baseline resp. status   CV/Hemodynamics: Uneventful            Sign Out: Acceptable CV status; No obvious hypovolemia; No obvious fluid overload   Other NRE: NONE   DID A NON-ROUTINE EVENT OCCUR?            Last vitals:  Vitals Value Taken Time   /88 07/16/25 15:15   Temp 36.3  C (97.4  F) 07/16/25 14:33   Pulse 62 07/16/25 15:19   Resp 19 07/16/25 15:19   SpO2 97 % 07/16/25 15:19   Vitals shown include unfiled device data.    Electronically Signed By: Sandy Martines MD  July 16, 2025  3:19 PM

## 2025-07-16 NOTE — DISCHARGE INSTRUCTIONS
Contacting your Doctor -   To contact a doctor, call Dr Aranda's office at [phone] ([facility]) or:  [phone] and ask for the resident on call for Surgery (answered 24 hours a day)   Emergency Department:  [facility]: [phone]  [phone] if you are in need of immediate or emergent help

## 2025-07-17 ENCOUNTER — TELEPHONE (OUTPATIENT)
Dept: SURGERY | Facility: CLINIC | Age: 70
End: 2025-07-17
Payer: COMMERCIAL

## 2025-07-17 ASSESSMENT — ACTIVITIES OF DAILY LIVING (ADL)
ADLS_ACUITY_SCORE: 36

## 2025-07-17 NOTE — TELEPHONE ENCOUNTER
Called and spoke with patient.  Patient reports that her pain very well-controlled.  She states that she has been up grocery shopping today. Patient denies chest pain, sob, nausea, vomiting, dizziness, and lightheadedness. She has no questions or concerns.

## 2025-07-17 NOTE — OP NOTE
Preoperative Diagnosis: Melanoma left arm    Postoperative Diagnosis: Same    Procedure: Wide local excision of the left forearm melanoma with a defect size of 11 x 4 cm, lymphatic mapping, left axillary sentinel lymph node biopsy.    Surgeons: Dr. Jermaine Aranda    Anesthesia: General    Indications for Surgery: The patient is a 70-year-old woman who was diagnosed with a 3.6 mm melanoma of her left forearm.  She had preoperative lymphoscintigraphy which revealed uptake in the left axilla.    Procedure in Detail: After informed consent the patient was brought the operating room and given a general anesthetic.  I injected isosulfan blue intradermally around her biopsy scar in her left forearm.  She was prepped and draped in the usual fashion.  I drew out a 2 cm margin of resection around the previous biopsy site and oriented the planned incision along the long axis of the extremity.  A local anesthetic was administered and an elliptical skin incision was made.  The Bovie cautery was used to incise subtenons tissues down to the fascia.  The specimen was dissected off of the fascia, oriented, and sent to pathology.  The defect size was 11 x 4 cm.  This is closed in multiple layers with interrupted 3-0 Vicryl suture for the dermal layer running 4-0 PDS subcuticular stitch for the skin.  And a 3-0 nylon horizontal mattress suture.  Next we used the neoprobe and scanned the left arm.  There was no uptake in the epitrochlear region.  There was a hotspot in the axilla.  A local anesthetic was administered to the left axilla and a transverse axillary incision was made with a scalpel.  The Bovie cautery was used to incise the subtenons tissues.  The clavipectoral fascia was divided.  I identified a radioactive and blue the lymph node.  This lymph node was completely removed.  Strict hemostasis was obtained with the Bovie cautery.  This incision was closed within open 3-0 Vicryl suture for the dermal layer and a running 4-0 PDS  subcuticular stitch for the skin.  Dermabond was placed and the patient was taken to the recovery room in stable condition.      Wide Local Excision for Primary Cutaneous Melanoma - Excision 1 (Lower Arm - Left)  Operation performed with curative intent Yes   Original Breslow thickness of the lesion 3.6 mm (to the tenth of a millimeter)   Clinical margin width 2 cm   Depth of excision Full-thickness skin/subcutaneous tissue down to fascia (melanoma)         Jermaine Aranda MD, MS  Surgical Oncology

## 2025-07-18 ASSESSMENT — ACTIVITIES OF DAILY LIVING (ADL)
ADLS_ACUITY_SCORE: 36

## 2025-07-19 ASSESSMENT — ACTIVITIES OF DAILY LIVING (ADL)
ADLS_ACUITY_SCORE: 36

## 2025-07-20 ASSESSMENT — ACTIVITIES OF DAILY LIVING (ADL)
ADLS_ACUITY_SCORE: 36

## 2025-07-23 LAB
PATH REPORT.COMMENTS IMP SPEC: NORMAL
PATH REPORT.COMMENTS IMP SPEC: NORMAL
PATH REPORT.FINAL DX SPEC: NORMAL
PATH REPORT.GROSS SPEC: NORMAL
PATH REPORT.MICROSCOPIC SPEC OTHER STN: NORMAL
PATH REPORT.RELEVANT HX SPEC: NORMAL
PHOTO IMAGE: NORMAL

## 2025-07-31 ENCOUNTER — PATIENT OUTREACH (OUTPATIENT)
Dept: ONCOLOGY | Facility: CLINIC | Age: 70
End: 2025-07-31

## 2025-07-31 ENCOUNTER — ONCOLOGY VISIT (OUTPATIENT)
Dept: ONCOLOGY | Facility: CLINIC | Age: 70
End: 2025-07-31
Attending: SURGERY
Payer: COMMERCIAL

## 2025-07-31 VITALS
WEIGHT: 216.4 LBS | DIASTOLIC BLOOD PRESSURE: 76 MMHG | OXYGEN SATURATION: 97 % | HEART RATE: 70 BPM | BODY MASS INDEX: 32.9 KG/M2 | SYSTOLIC BLOOD PRESSURE: 115 MMHG | RESPIRATION RATE: 16 BRPM | TEMPERATURE: 98.3 F

## 2025-07-31 DIAGNOSIS — C43.62 MALIGNANT MELANOMA OF LEFT UPPER EXTREMITY INCLUDING SHOULDER (H): Primary | ICD-10-CM

## 2025-07-31 NOTE — PROGRESS NOTES
New Patient Oncology Nurse Navigator Note     Referring provider:     Jermaine Aranda MD        Referring Clinic/Organization: [facility]      Referred to (specialty:) Medical Oncology     Requested provider (if applicable): Dr. Kathrine Ellington      Date Referral Received: July 31, 2025     Evaluation for:  C43.62 (ICD-10-CM) - Malignant melanoma of left upper extremity including shoulder (H) MELANOMA - S/P RESECTION      Clinical History (per Nurse review of records provided):      Patient seen by Dr. Aranda in post-operative follow up after undergoing a wide local excision and a SLN bx for a 3/6 mm melanoma of her left arm. Referral placed for medical oncology to discuss adjuvant therapy and the role of surveillance imaging.     Case Report   Surgical Pathology Report                         Case: [id number]                                   Authorizing Provider:  Jermaine Aranda MD         Collected:           07/16/2025 01:40 PM           Ordering Location:      MAIN OR                 Received:            07/16/2025 02:49 PM           Pathologist:           Garrett Inman MD                                                         Specimens:   A) - Forearm, Left, Wide exicision LEFT arm melanoma                                                B) - Lymph Node(s), [place], LEFT axillary sentinel lymph node biopsy #1                  Final Diagnosis   A. Wide exicision LEFT arm melanoma:  - Scar from the prior surgical procedure, with no evidence of residual melanoma - (see description)     B. LEFT axillary sentinel lymph node biopsy #1:  - One lymph node, with no evidence of melanoma metastasis (0 of 1 lymph nodes) - (see description)   Electronically signed by Garrett Inman MD on 7/23/2025 at 2050 CDT        Records Location: See Bookmarked material     Records Needed: NA     Additional testing needed prior to consult: NA    Payor: [facility] / Plan: [facility]  MEDICARE / Product Type: HMO /     July 31, 2025  Referral received and reviewed.   Message sent to leadership about a slot on 8/19/25 with Dr. Ellington.     Katya ARMIJON, RN, OCN  Oncology Nurse Navigator   [facility]  Cancer Care Service Line   New Patient Hem/Onc Scheduling / Referrals: [phone] (fax: [phone] )

## 2025-08-20 ENCOUNTER — MYC MEDICAL ADVICE (OUTPATIENT)
Dept: ONCOLOGY | Facility: CLINIC | Age: 70
End: 2025-08-20
Payer: COMMERCIAL

## 2025-08-20 ENCOUNTER — ONCOLOGY VISIT (OUTPATIENT)
Dept: ONCOLOGY | Facility: CLINIC | Age: 70
End: 2025-08-20
Attending: SURGERY
Payer: COMMERCIAL

## 2025-08-20 VITALS
OXYGEN SATURATION: 98 % | TEMPERATURE: 98 F | RESPIRATION RATE: 16 BRPM | HEIGHT: 68 IN | HEART RATE: 91 BPM | SYSTOLIC BLOOD PRESSURE: 111 MMHG | WEIGHT: 212 LBS | DIASTOLIC BLOOD PRESSURE: 71 MMHG | BODY MASS INDEX: 32.13 KG/M2

## 2025-08-20 DIAGNOSIS — C43.62 MALIGNANT MELANOMA OF LEFT UPPER EXTREMITY INCLUDING SHOULDER (H): Primary | ICD-10-CM

## 2025-08-20 PROCEDURE — G0463 HOSPITAL OUTPT CLINIC VISIT: HCPCS | Performed by: HOSPITALIST

## 2025-08-20 PROCEDURE — 99205 OFFICE O/P NEW HI 60 MIN: CPT | Performed by: HOSPITALIST

## 2025-08-20 PROCEDURE — G2211 COMPLEX E/M VISIT ADD ON: HCPCS | Performed by: HOSPITALIST

## 2025-08-20 RX ORDER — HEPARIN SODIUM (PORCINE) LOCK FLUSH IV SOLN 100 UNIT/ML 100 UNIT/ML
5 SOLUTION INTRAVENOUS
OUTPATIENT
Start: 2025-09-10

## 2025-08-20 RX ORDER — METHYLPREDNISOLONE SODIUM SUCCINATE 40 MG/ML
40 INJECTION INTRAMUSCULAR; INTRAVENOUS
Start: 2026-02-25

## 2025-08-20 RX ORDER — ALBUTEROL SULFATE 90 UG/1
1-2 INHALANT RESPIRATORY (INHALATION)
Start: 2025-10-22

## 2025-08-20 RX ORDER — METHYLPREDNISOLONE SODIUM SUCCINATE 40 MG/ML
40 INJECTION INTRAMUSCULAR; INTRAVENOUS
Start: 2026-07-01

## 2025-08-20 RX ORDER — ALBUTEROL SULFATE 0.83 MG/ML
2.5 SOLUTION RESPIRATORY (INHALATION)
OUTPATIENT
Start: 2026-07-01

## 2025-08-20 RX ORDER — DIPHENHYDRAMINE HYDROCHLORIDE 50 MG/ML
50 INJECTION, SOLUTION INTRAMUSCULAR; INTRAVENOUS
Start: 2026-07-01

## 2025-08-20 RX ORDER — EPINEPHRINE 1 MG/ML
0.3 INJECTION, SOLUTION INTRAMUSCULAR; SUBCUTANEOUS EVERY 5 MIN PRN
OUTPATIENT
Start: 2025-12-03

## 2025-08-20 RX ORDER — LORAZEPAM 2 MG/ML
0.5 INJECTION INTRAMUSCULAR EVERY 4 HOURS PRN
OUTPATIENT
Start: 2025-09-10

## 2025-08-20 RX ORDER — DIPHENHYDRAMINE HYDROCHLORIDE 50 MG/ML
50 INJECTION, SOLUTION INTRAMUSCULAR; INTRAVENOUS
Start: 2025-09-10

## 2025-08-20 RX ORDER — HEPARIN SODIUM (PORCINE) LOCK FLUSH IV SOLN 100 UNIT/ML 100 UNIT/ML
5 SOLUTION INTRAVENOUS
OUTPATIENT
Start: 2026-02-25

## 2025-08-20 RX ORDER — EPINEPHRINE 1 MG/ML
0.3 INJECTION, SOLUTION INTRAMUSCULAR; SUBCUTANEOUS EVERY 5 MIN PRN
OUTPATIENT
Start: 2026-02-25

## 2025-08-20 RX ORDER — MEPERIDINE HYDROCHLORIDE 25 MG/ML
25 INJECTION INTRAMUSCULAR; INTRAVENOUS; SUBCUTANEOUS
OUTPATIENT
Start: 2025-10-22

## 2025-08-20 RX ORDER — ALBUTEROL SULFATE 90 UG/1
1-2 INHALANT RESPIRATORY (INHALATION)
Start: 2025-12-03

## 2025-08-20 RX ORDER — DIPHENHYDRAMINE HYDROCHLORIDE 50 MG/ML
25 INJECTION, SOLUTION INTRAMUSCULAR; INTRAVENOUS
Start: 2026-07-01

## 2025-08-20 RX ORDER — ALBUTEROL SULFATE 0.83 MG/ML
2.5 SOLUTION RESPIRATORY (INHALATION)
OUTPATIENT
Start: 2026-05-20

## 2025-08-20 RX ORDER — HEPARIN SODIUM,PORCINE 10 UNIT/ML
5-20 VIAL (ML) INTRAVENOUS DAILY PRN
OUTPATIENT
Start: 2025-10-22

## 2025-08-20 RX ORDER — LORAZEPAM 2 MG/ML
0.5 INJECTION INTRAMUSCULAR EVERY 4 HOURS PRN
OUTPATIENT
Start: 2026-04-08

## 2025-08-20 RX ORDER — LORAZEPAM 2 MG/ML
0.5 INJECTION INTRAMUSCULAR EVERY 4 HOURS PRN
OUTPATIENT
Start: 2025-10-22

## 2025-08-20 RX ORDER — ALBUTEROL SULFATE 0.83 MG/ML
2.5 SOLUTION RESPIRATORY (INHALATION)
OUTPATIENT
Start: 2025-12-03

## 2025-08-20 RX ORDER — MEPERIDINE HYDROCHLORIDE 25 MG/ML
25 INJECTION INTRAMUSCULAR; INTRAVENOUS; SUBCUTANEOUS
OUTPATIENT
Start: 2026-07-01

## 2025-08-20 RX ORDER — HEPARIN SODIUM,PORCINE 10 UNIT/ML
5-20 VIAL (ML) INTRAVENOUS DAILY PRN
OUTPATIENT
Start: 2025-12-03

## 2025-08-20 RX ORDER — DIPHENHYDRAMINE HYDROCHLORIDE 50 MG/ML
50 INJECTION, SOLUTION INTRAMUSCULAR; INTRAVENOUS
Start: 2026-01-14

## 2025-08-20 RX ORDER — HEPARIN SODIUM (PORCINE) LOCK FLUSH IV SOLN 100 UNIT/ML 100 UNIT/ML
5 SOLUTION INTRAVENOUS
OUTPATIENT
Start: 2026-01-14

## 2025-08-20 RX ORDER — LORAZEPAM 2 MG/ML
0.5 INJECTION INTRAMUSCULAR EVERY 4 HOURS PRN
OUTPATIENT
Start: 2026-02-25

## 2025-08-20 RX ORDER — MEPERIDINE HYDROCHLORIDE 25 MG/ML
25 INJECTION INTRAMUSCULAR; INTRAVENOUS; SUBCUTANEOUS
OUTPATIENT
Start: 2026-01-14

## 2025-08-20 RX ORDER — MEPERIDINE HYDROCHLORIDE 25 MG/ML
25 INJECTION INTRAMUSCULAR; INTRAVENOUS; SUBCUTANEOUS
OUTPATIENT
Start: 2025-12-03

## 2025-08-20 RX ORDER — ALBUTEROL SULFATE 90 UG/1
1-2 INHALANT RESPIRATORY (INHALATION)
Start: 2026-07-01

## 2025-08-20 RX ORDER — METHYLPREDNISOLONE SODIUM SUCCINATE 40 MG/ML
40 INJECTION INTRAMUSCULAR; INTRAVENOUS
Start: 2025-12-03

## 2025-08-20 RX ORDER — HEPARIN SODIUM (PORCINE) LOCK FLUSH IV SOLN 100 UNIT/ML 100 UNIT/ML
5 SOLUTION INTRAVENOUS
OUTPATIENT
Start: 2025-12-03

## 2025-08-20 RX ORDER — MEPERIDINE HYDROCHLORIDE 25 MG/ML
25 INJECTION INTRAMUSCULAR; INTRAVENOUS; SUBCUTANEOUS
OUTPATIENT
Start: 2025-09-10

## 2025-08-20 RX ORDER — ALBUTEROL SULFATE 0.83 MG/ML
2.5 SOLUTION RESPIRATORY (INHALATION)
OUTPATIENT
Start: 2026-01-14

## 2025-08-20 RX ORDER — EPINEPHRINE 1 MG/ML
0.3 INJECTION, SOLUTION INTRAMUSCULAR; SUBCUTANEOUS EVERY 5 MIN PRN
OUTPATIENT
Start: 2026-04-08

## 2025-08-20 RX ORDER — ALBUTEROL SULFATE 0.83 MG/ML
2.5 SOLUTION RESPIRATORY (INHALATION)
OUTPATIENT
Start: 2026-04-08

## 2025-08-20 RX ORDER — HEPARIN SODIUM,PORCINE 10 UNIT/ML
5-20 VIAL (ML) INTRAVENOUS DAILY PRN
OUTPATIENT
Start: 2026-05-20

## 2025-08-20 RX ORDER — HEPARIN SODIUM (PORCINE) LOCK FLUSH IV SOLN 100 UNIT/ML 100 UNIT/ML
5 SOLUTION INTRAVENOUS
OUTPATIENT
Start: 2026-04-08

## 2025-08-20 RX ORDER — EPINEPHRINE 1 MG/ML
0.3 INJECTION, SOLUTION INTRAMUSCULAR; SUBCUTANEOUS EVERY 5 MIN PRN
OUTPATIENT
Start: 2025-09-10

## 2025-08-20 RX ORDER — DIPHENHYDRAMINE HYDROCHLORIDE 50 MG/ML
25 INJECTION, SOLUTION INTRAMUSCULAR; INTRAVENOUS
Start: 2026-04-08

## 2025-08-20 RX ORDER — DIPHENHYDRAMINE HYDROCHLORIDE 50 MG/ML
50 INJECTION, SOLUTION INTRAMUSCULAR; INTRAVENOUS
Start: 2025-10-22

## 2025-08-20 RX ORDER — DIPHENHYDRAMINE HYDROCHLORIDE 50 MG/ML
50 INJECTION, SOLUTION INTRAMUSCULAR; INTRAVENOUS
Start: 2025-12-03

## 2025-08-20 RX ORDER — HEPARIN SODIUM,PORCINE 10 UNIT/ML
5-20 VIAL (ML) INTRAVENOUS DAILY PRN
OUTPATIENT
Start: 2026-07-01

## 2025-08-20 RX ORDER — METHYLPREDNISOLONE SODIUM SUCCINATE 40 MG/ML
40 INJECTION INTRAMUSCULAR; INTRAVENOUS
Start: 2026-05-20

## 2025-08-20 RX ORDER — ALBUTEROL SULFATE 0.83 MG/ML
2.5 SOLUTION RESPIRATORY (INHALATION)
OUTPATIENT
Start: 2026-02-25

## 2025-08-20 RX ORDER — HEPARIN SODIUM (PORCINE) LOCK FLUSH IV SOLN 100 UNIT/ML 100 UNIT/ML
5 SOLUTION INTRAVENOUS
OUTPATIENT
Start: 2026-07-01

## 2025-08-20 RX ORDER — DIPHENHYDRAMINE HYDROCHLORIDE 50 MG/ML
25 INJECTION, SOLUTION INTRAMUSCULAR; INTRAVENOUS
Start: 2025-10-22

## 2025-08-20 RX ORDER — METHYLPREDNISOLONE SODIUM SUCCINATE 40 MG/ML
40 INJECTION INTRAMUSCULAR; INTRAVENOUS
Start: 2026-01-14

## 2025-08-20 RX ORDER — EPINEPHRINE 1 MG/ML
0.3 INJECTION, SOLUTION INTRAMUSCULAR; SUBCUTANEOUS EVERY 5 MIN PRN
OUTPATIENT
Start: 2026-05-20

## 2025-08-20 RX ORDER — LORAZEPAM 2 MG/ML
0.5 INJECTION INTRAMUSCULAR EVERY 4 HOURS PRN
OUTPATIENT
Start: 2026-01-14

## 2025-08-20 RX ORDER — EPINEPHRINE 1 MG/ML
0.3 INJECTION, SOLUTION INTRAMUSCULAR; SUBCUTANEOUS EVERY 5 MIN PRN
OUTPATIENT
Start: 2026-01-14

## 2025-08-20 RX ORDER — HEPARIN SODIUM,PORCINE 10 UNIT/ML
5-20 VIAL (ML) INTRAVENOUS DAILY PRN
OUTPATIENT
Start: 2026-02-25

## 2025-08-20 RX ORDER — EPINEPHRINE 1 MG/ML
0.3 INJECTION, SOLUTION INTRAMUSCULAR; SUBCUTANEOUS EVERY 5 MIN PRN
OUTPATIENT
Start: 2026-07-01

## 2025-08-20 RX ORDER — DIPHENHYDRAMINE HYDROCHLORIDE 50 MG/ML
50 INJECTION, SOLUTION INTRAMUSCULAR; INTRAVENOUS
Start: 2026-02-25

## 2025-08-20 RX ORDER — ALBUTEROL SULFATE 0.83 MG/ML
2.5 SOLUTION RESPIRATORY (INHALATION)
OUTPATIENT
Start: 2025-09-10

## 2025-08-20 RX ORDER — MEPERIDINE HYDROCHLORIDE 25 MG/ML
25 INJECTION INTRAMUSCULAR; INTRAVENOUS; SUBCUTANEOUS
OUTPATIENT
Start: 2026-05-20

## 2025-08-20 RX ORDER — DIPHENHYDRAMINE HYDROCHLORIDE 50 MG/ML
25 INJECTION, SOLUTION INTRAMUSCULAR; INTRAVENOUS
Start: 2026-01-14

## 2025-08-20 RX ORDER — ALBUTEROL SULFATE 90 UG/1
1-2 INHALANT RESPIRATORY (INHALATION)
Start: 2026-02-25

## 2025-08-20 RX ORDER — ALBUTEROL SULFATE 0.83 MG/ML
2.5 SOLUTION RESPIRATORY (INHALATION)
OUTPATIENT
Start: 2025-10-22

## 2025-08-20 RX ORDER — ALBUTEROL SULFATE 90 UG/1
1-2 INHALANT RESPIRATORY (INHALATION)
Start: 2026-01-14

## 2025-08-20 RX ORDER — EPINEPHRINE 1 MG/ML
0.3 INJECTION, SOLUTION INTRAMUSCULAR; SUBCUTANEOUS EVERY 5 MIN PRN
OUTPATIENT
Start: 2025-10-22

## 2025-08-20 RX ORDER — LORAZEPAM 2 MG/ML
0.5 INJECTION INTRAMUSCULAR EVERY 4 HOURS PRN
OUTPATIENT
Start: 2026-07-01

## 2025-08-20 RX ORDER — ALBUTEROL SULFATE 90 UG/1
1-2 INHALANT RESPIRATORY (INHALATION)
Start: 2026-04-08

## 2025-08-20 RX ORDER — HEPARIN SODIUM (PORCINE) LOCK FLUSH IV SOLN 100 UNIT/ML 100 UNIT/ML
5 SOLUTION INTRAVENOUS
OUTPATIENT
Start: 2025-10-22

## 2025-08-20 RX ORDER — METHYLPREDNISOLONE SODIUM SUCCINATE 40 MG/ML
40 INJECTION INTRAMUSCULAR; INTRAVENOUS
Start: 2026-04-08

## 2025-08-20 RX ORDER — DIPHENHYDRAMINE HYDROCHLORIDE 50 MG/ML
50 INJECTION, SOLUTION INTRAMUSCULAR; INTRAVENOUS
Start: 2026-05-20

## 2025-08-20 RX ORDER — ALBUTEROL SULFATE 90 UG/1
1-2 INHALANT RESPIRATORY (INHALATION)
Start: 2026-05-20

## 2025-08-20 RX ORDER — DIPHENHYDRAMINE HYDROCHLORIDE 50 MG/ML
25 INJECTION, SOLUTION INTRAMUSCULAR; INTRAVENOUS
Start: 2026-05-20

## 2025-08-20 RX ORDER — ALBUTEROL SULFATE 90 UG/1
1-2 INHALANT RESPIRATORY (INHALATION)
Start: 2025-09-10

## 2025-08-20 RX ORDER — HEPARIN SODIUM,PORCINE 10 UNIT/ML
5-20 VIAL (ML) INTRAVENOUS DAILY PRN
OUTPATIENT
Start: 2026-04-08

## 2025-08-20 RX ORDER — DIPHENHYDRAMINE HYDROCHLORIDE 50 MG/ML
25 INJECTION, SOLUTION INTRAMUSCULAR; INTRAVENOUS
Start: 2025-12-03

## 2025-08-20 RX ORDER — MEPERIDINE HYDROCHLORIDE 25 MG/ML
25 INJECTION INTRAMUSCULAR; INTRAVENOUS; SUBCUTANEOUS
OUTPATIENT
Start: 2026-04-08

## 2025-08-20 RX ORDER — METHYLPREDNISOLONE SODIUM SUCCINATE 40 MG/ML
40 INJECTION INTRAMUSCULAR; INTRAVENOUS
Start: 2025-10-22

## 2025-08-20 RX ORDER — LORAZEPAM 2 MG/ML
0.5 INJECTION INTRAMUSCULAR EVERY 4 HOURS PRN
OUTPATIENT
Start: 2026-05-20

## 2025-08-20 RX ORDER — METHYLPREDNISOLONE SODIUM SUCCINATE 40 MG/ML
40 INJECTION INTRAMUSCULAR; INTRAVENOUS
Start: 2025-09-10

## 2025-08-20 RX ORDER — HEPARIN SODIUM,PORCINE 10 UNIT/ML
5-20 VIAL (ML) INTRAVENOUS DAILY PRN
OUTPATIENT
Start: 2026-01-14

## 2025-08-20 RX ORDER — LORAZEPAM 2 MG/ML
0.5 INJECTION INTRAMUSCULAR EVERY 4 HOURS PRN
OUTPATIENT
Start: 2025-12-03

## 2025-08-20 RX ORDER — MEPERIDINE HYDROCHLORIDE 25 MG/ML
25 INJECTION INTRAMUSCULAR; INTRAVENOUS; SUBCUTANEOUS
OUTPATIENT
Start: 2026-02-25

## 2025-08-20 RX ORDER — DIPHENHYDRAMINE HYDROCHLORIDE 50 MG/ML
25 INJECTION, SOLUTION INTRAMUSCULAR; INTRAVENOUS
Start: 2025-09-10

## 2025-08-20 RX ORDER — HEPARIN SODIUM (PORCINE) LOCK FLUSH IV SOLN 100 UNIT/ML 100 UNIT/ML
5 SOLUTION INTRAVENOUS
OUTPATIENT
Start: 2026-05-20

## 2025-08-20 RX ORDER — DIPHENHYDRAMINE HYDROCHLORIDE 50 MG/ML
25 INJECTION, SOLUTION INTRAMUSCULAR; INTRAVENOUS
Start: 2026-02-25

## 2025-08-20 RX ORDER — HEPARIN SODIUM,PORCINE 10 UNIT/ML
5-20 VIAL (ML) INTRAVENOUS DAILY PRN
OUTPATIENT
Start: 2025-09-10

## 2025-08-20 RX ORDER — DIPHENHYDRAMINE HYDROCHLORIDE 50 MG/ML
50 INJECTION, SOLUTION INTRAMUSCULAR; INTRAVENOUS
Start: 2026-04-08

## 2025-08-20 ASSESSMENT — PAIN SCALES - GENERAL: PAINLEVEL_OUTOF10: NO PAIN (0)

## 2025-08-25 ENCOUNTER — PATIENT OUTREACH (OUTPATIENT)
Dept: ONCOLOGY | Facility: CLINIC | Age: 70
End: 2025-08-25
Payer: COMMERCIAL

## 2025-08-26 ENCOUNTER — TELEPHONE (OUTPATIENT)
Dept: ENDOCRINOLOGY | Facility: CLINIC | Age: 70
End: 2025-08-26
Payer: COMMERCIAL

## 2025-08-26 ENCOUNTER — PATIENT OUTREACH (OUTPATIENT)
Dept: ONCOLOGY | Facility: CLINIC | Age: 70
End: 2025-08-26
Payer: COMMERCIAL

## 2025-08-28 ENCOUNTER — PATIENT OUTREACH (OUTPATIENT)
Dept: ONCOLOGY | Facility: CLINIC | Age: 70
End: 2025-08-28
Payer: COMMERCIAL

## 2025-08-29 ENCOUNTER — HOSPITAL ENCOUNTER (OUTPATIENT)
Dept: MRI IMAGING | Facility: CLINIC | Age: 70
Discharge: HOME | End: 2025-08-29
Attending: HOSPITALIST
Payer: COMMERCIAL

## 2025-08-29 ENCOUNTER — HOSPITAL ENCOUNTER (OUTPATIENT)
Dept: CT IMAGING | Facility: CLINIC | Age: 70
Discharge: HOME | End: 2025-08-29
Attending: HOSPITALIST
Payer: COMMERCIAL

## 2025-08-29 DIAGNOSIS — C43.62 MALIGNANT MELANOMA OF LEFT UPPER EXTREMITY INCLUDING SHOULDER (H): ICD-10-CM

## 2025-08-29 LAB
CREAT BLD-MCNC: 0.9 MG/DL (ref 0.5–1)
EGFRCR SERPLBLD CKD-EPI 2021: >60 ML/MIN/1.73M2

## 2025-08-29 PROCEDURE — 82565 ASSAY OF CREATININE: CPT

## 2025-08-29 PROCEDURE — 255N000002 HC RX 255 OP 636: Performed by: HOSPITALIST

## 2025-08-29 PROCEDURE — 71260 CT THORAX DX C+: CPT

## 2025-08-29 PROCEDURE — A9585 GADOBUTROL INJECTION: HCPCS | Performed by: HOSPITALIST

## 2025-08-29 PROCEDURE — 70553 MRI BRAIN STEM W/O & W/DYE: CPT

## 2025-08-29 PROCEDURE — 250N000009 HC RX 250: Performed by: HOSPITALIST

## 2025-08-29 RX ORDER — GADOBUTROL 604.72 MG/ML
9.5 INJECTION INTRAVENOUS ONCE
Status: COMPLETED | OUTPATIENT
Start: 2025-08-29 | End: 2025-08-29

## 2025-08-29 RX ADMIN — GADOBUTROL 9.5 ML: 604.72 INJECTION INTRAVENOUS at 10:11

## 2025-08-29 RX ADMIN — SODIUM CHLORIDE 60 ML: 9 INJECTION, SOLUTION INTRAVENOUS at 10:26

## 2025-08-29 RX ADMIN — IOHEXOL 123 ML: 350 INJECTION, SOLUTION INTRAVENOUS at 10:27

## 2025-09-02 ENCOUNTER — VIRTUAL VISIT (OUTPATIENT)
Dept: ENDOCRINOLOGY | Facility: CLINIC | Age: 70
End: 2025-09-02
Attending: HOSPITALIST
Payer: COMMERCIAL

## 2025-09-02 DIAGNOSIS — E06.3 HYPOTHYROIDISM DUE TO HASHIMOTO THYROIDITIS: Primary | ICD-10-CM

## 2025-09-02 DIAGNOSIS — R79.89 LOW TSH LEVEL: ICD-10-CM

## 2025-09-02 PROCEDURE — 98003 SYNCH AUDIO-VIDEO NEW HI 60: CPT

## 2025-09-02 PROCEDURE — G2211 COMPLEX E/M VISIT ADD ON: HCPCS

## 2025-09-02 RX ORDER — THYROID 60 MG/1
60 TABLET ORAL 3 TIMES DAILY
Qty: 90 TABLET | Refills: 11 | Status: SHIPPED | OUTPATIENT
Start: 2025-09-02

## (undated) DEVICE — CLIP HORIZON SM RED WIDE SLOT 001201

## (undated) DEVICE — KIT PROCEDURE SPY-PHI SGL HH9006

## (undated) DEVICE — SU PDS II 4-0 FS-2 27" Z422H

## (undated) DEVICE — SOL NACL 0.9% IRRIG 1000ML BOTTLE 2F7124

## (undated) DEVICE — GLOVE PROTEXIS POWDER FREE ORANGE 8.0  2D72PT80X

## (undated) DEVICE — LINEN TOWEL PACK X5 5464

## (undated) DEVICE — LINEN TOWEL PACK X6 WHITE 5487

## (undated) DEVICE — SUCTION MANIFOLD NEPTUNE 2 SYS 4 PORT 0702-020-000

## (undated) DEVICE — SU SILK 3-0 TIE 12X30" A304H

## (undated) DEVICE — NDL 25GA 2"  8881200441

## (undated) DEVICE — PREP CHLORAPREP 26ML TINTED HI-LITE ORANGE 930815

## (undated) DEVICE — CLIP HORIZON MED BLUE 002200

## (undated) DEVICE — ESU GROUND PAD ADULT W/CORD E7507

## (undated) DEVICE — DRAPE SHEET MED 44X70" 9355

## (undated) DEVICE — SU DERMABOND ADVANCED .7ML DNX12

## (undated) DEVICE — SU VICRYL 3-0 SH 27" J316H

## (undated) DEVICE — Device

## (undated) DEVICE — SU SILK 3-0 SH 30" K832H

## (undated) DEVICE — SOL WATER IRRIG 1000ML BOTTLE 2F7114

## (undated) DEVICE — SU SILK 4-0 TIE 12X30" A303H

## (undated) RX ORDER — CEFAZOLIN SODIUM/WATER 2 G/20 ML
SYRINGE (ML) INTRAVENOUS
Status: DISPENSED
Start: 2025-07-16

## (undated) RX ORDER — LIDOCAINE HYDROCHLORIDE AND EPINEPHRINE 10; 10 MG/ML; UG/ML
INJECTION, SOLUTION INFILTRATION; PERINEURAL
Status: DISPENSED
Start: 2025-07-16

## (undated) RX ORDER — FENTANYL CITRATE 50 UG/ML
INJECTION, SOLUTION INTRAMUSCULAR; INTRAVENOUS
Status: DISPENSED
Start: 2025-07-16

## (undated) RX ORDER — IPRATROPIUM BROMIDE AND ALBUTEROL SULFATE 2.5; .5 MG/3ML; MG/3ML
SOLUTION RESPIRATORY (INHALATION)
Status: DISPENSED
Start: 2019-06-05

## (undated) RX ORDER — ACETAMINOPHEN 325 MG/1
TABLET ORAL
Status: DISPENSED
Start: 2025-07-16

## (undated) RX ORDER — METHYLPREDNISOLONE ACETATE 40 MG/ML
INJECTION, SUSPENSION INTRA-ARTICULAR; INTRALESIONAL; INTRAMUSCULAR; SOFT TISSUE
Status: DISPENSED
Start: 2020-05-15

## (undated) RX ORDER — FENTANYL CITRATE-0.9 % NACL/PF 10 MCG/ML
PLASTIC BAG, INJECTION (ML) INTRAVENOUS
Status: DISPENSED
Start: 2025-07-16

## (undated) RX ORDER — ISOSULFAN BLUE 50 MG/5ML
INJECTION, SOLUTION SUBCUTANEOUS
Status: DISPENSED
Start: 2025-07-16

## (undated) RX ORDER — ENOXAPARIN SODIUM 100 MG/ML
INJECTION SUBCUTANEOUS
Status: DISPENSED
Start: 2025-07-16

## (undated) RX ORDER — BUPIVACAINE HYDROCHLORIDE 5 MG/ML
INJECTION, SOLUTION EPIDURAL; INTRACAUDAL
Status: DISPENSED
Start: 2020-05-15

## (undated) RX ORDER — ONDANSETRON 2 MG/ML
INJECTION INTRAMUSCULAR; INTRAVENOUS
Status: DISPENSED
Start: 2025-07-16

## (undated) RX ORDER — PROPOFOL 10 MG/ML
INJECTION, EMULSION INTRAVENOUS
Status: DISPENSED
Start: 2025-07-16

## (undated) RX ORDER — DEXAMETHASONE SODIUM PHOSPHATE 4 MG/ML
INJECTION, SOLUTION INTRA-ARTICULAR; INTRALESIONAL; INTRAMUSCULAR; INTRAVENOUS; SOFT TISSUE
Status: DISPENSED
Start: 2025-07-16

## (undated) RX ORDER — BUPIVACAINE HYDROCHLORIDE 2.5 MG/ML
INJECTION, SOLUTION EPIDURAL; INFILTRATION; INTRACAUDAL; PERINEURAL
Status: DISPENSED
Start: 2025-07-16